# Patient Record
Sex: FEMALE | Race: OTHER | HISPANIC OR LATINO | URBAN - METROPOLITAN AREA
[De-identification: names, ages, dates, MRNs, and addresses within clinical notes are randomized per-mention and may not be internally consistent; named-entity substitution may affect disease eponyms.]

---

## 2018-03-24 ENCOUNTER — EMERGENCY (EMERGENCY)
Facility: HOSPITAL | Age: 83
LOS: 1 days | Discharge: HOME | End: 2018-03-24
Attending: EMERGENCY MEDICINE | Admitting: EMERGENCY MEDICINE
Payer: MEDICARE

## 2018-03-24 VITALS
SYSTOLIC BLOOD PRESSURE: 152 MMHG | HEART RATE: 82 BPM | TEMPERATURE: 98 F | RESPIRATION RATE: 17 BRPM | DIASTOLIC BLOOD PRESSURE: 78 MMHG | OXYGEN SATURATION: 99 %

## 2018-03-24 VITALS
OXYGEN SATURATION: 100 % | SYSTOLIC BLOOD PRESSURE: 138 MMHG | DIASTOLIC BLOOD PRESSURE: 94 MMHG | HEART RATE: 75 BPM | RESPIRATION RATE: 18 BRPM

## 2018-03-24 DIAGNOSIS — Z98.890 OTHER SPECIFIED POSTPROCEDURAL STATES: Chronic | ICD-10-CM

## 2018-03-24 LAB
ALBUMIN SERPL ELPH-MCNC: 3.9 G/DL — SIGNIFICANT CHANGE UP (ref 3.3–5)
ALP SERPL-CCNC: 80 U/L — SIGNIFICANT CHANGE UP (ref 40–120)
ALT FLD-CCNC: 15 U/L RC — SIGNIFICANT CHANGE UP (ref 10–45)
ANION GAP SERPL CALC-SCNC: 12 MMOL/L — SIGNIFICANT CHANGE UP (ref 5–17)
APPEARANCE UR: CLEAR — SIGNIFICANT CHANGE UP
AST SERPL-CCNC: 18 U/L — SIGNIFICANT CHANGE UP (ref 10–40)
BASOPHILS # BLD AUTO: 0 K/UL — SIGNIFICANT CHANGE UP (ref 0–0.2)
BASOPHILS NFR BLD AUTO: 0.4 % — SIGNIFICANT CHANGE UP (ref 0–2)
BILIRUB SERPL-MCNC: 0.3 MG/DL — SIGNIFICANT CHANGE UP (ref 0.2–1.2)
BILIRUB UR-MCNC: NEGATIVE — SIGNIFICANT CHANGE UP
BUN SERPL-MCNC: 33 MG/DL — HIGH (ref 7–23)
CALCIUM SERPL-MCNC: 10 MG/DL — SIGNIFICANT CHANGE UP (ref 8.4–10.5)
CHLORIDE SERPL-SCNC: 99 MMOL/L — SIGNIFICANT CHANGE UP (ref 96–108)
CO2 SERPL-SCNC: 27 MMOL/L — SIGNIFICANT CHANGE UP (ref 22–31)
COLOR SPEC: YELLOW — SIGNIFICANT CHANGE UP
COMMENT - URINE: SIGNIFICANT CHANGE UP
CREAT SERPL-MCNC: 0.99 MG/DL — SIGNIFICANT CHANGE UP (ref 0.5–1.3)
DIFF PNL FLD: NEGATIVE — SIGNIFICANT CHANGE UP
EOSINOPHIL # BLD AUTO: 0.1 K/UL — SIGNIFICANT CHANGE UP (ref 0–0.5)
EOSINOPHIL NFR BLD AUTO: 0.8 % — SIGNIFICANT CHANGE UP (ref 0–6)
EPI CELLS # UR: SIGNIFICANT CHANGE UP /HPF
GLUCOSE SERPL-MCNC: 104 MG/DL — HIGH (ref 70–99)
GLUCOSE UR QL: NEGATIVE — SIGNIFICANT CHANGE UP
HCT VFR BLD CALC: 35.3 % — SIGNIFICANT CHANGE UP (ref 34.5–45)
HGB BLD-MCNC: 11.5 G/DL — SIGNIFICANT CHANGE UP (ref 11.5–15.5)
KETONES UR-MCNC: NEGATIVE — SIGNIFICANT CHANGE UP
LEUKOCYTE ESTERASE UR-ACNC: ABNORMAL
LYMPHOCYTES # BLD AUTO: 1.6 K/UL — SIGNIFICANT CHANGE UP (ref 1–3.3)
LYMPHOCYTES # BLD AUTO: 25 % — SIGNIFICANT CHANGE UP (ref 13–44)
MCHC RBC-ENTMCNC: 29.3 PG — SIGNIFICANT CHANGE UP (ref 27–34)
MCHC RBC-ENTMCNC: 32.7 GM/DL — SIGNIFICANT CHANGE UP (ref 32–36)
MCV RBC AUTO: 89.8 FL — SIGNIFICANT CHANGE UP (ref 80–100)
MONOCYTES # BLD AUTO: 0.7 K/UL — SIGNIFICANT CHANGE UP (ref 0–0.9)
MONOCYTES NFR BLD AUTO: 10.2 % — SIGNIFICANT CHANGE UP (ref 2–14)
NEUTROPHILS # BLD AUTO: 4.2 K/UL — SIGNIFICANT CHANGE UP (ref 1.8–7.4)
NEUTROPHILS NFR BLD AUTO: 63.6 % — SIGNIFICANT CHANGE UP (ref 43–77)
NITRITE UR-MCNC: NEGATIVE — SIGNIFICANT CHANGE UP
PH UR: 5.5 — SIGNIFICANT CHANGE UP (ref 5–8)
PLATELET # BLD AUTO: 303 K/UL — SIGNIFICANT CHANGE UP (ref 150–400)
POTASSIUM SERPL-MCNC: 4.7 MMOL/L — SIGNIFICANT CHANGE UP (ref 3.5–5.3)
POTASSIUM SERPL-SCNC: 4.7 MMOL/L — SIGNIFICANT CHANGE UP (ref 3.5–5.3)
PROT SERPL-MCNC: 7.2 G/DL — SIGNIFICANT CHANGE UP (ref 6–8.3)
PROT UR-MCNC: SIGNIFICANT CHANGE UP
RBC # BLD: 3.93 M/UL — SIGNIFICANT CHANGE UP (ref 3.8–5.2)
RBC # FLD: 13.6 % — SIGNIFICANT CHANGE UP (ref 10.3–14.5)
RBC CASTS # UR COMP ASSIST: SIGNIFICANT CHANGE UP /HPF (ref 0–2)
SODIUM SERPL-SCNC: 138 MMOL/L — SIGNIFICANT CHANGE UP (ref 135–145)
SP GR SPEC: 1.02 — SIGNIFICANT CHANGE UP (ref 1.01–1.02)
UROBILINOGEN FLD QL: NEGATIVE — SIGNIFICANT CHANGE UP
WBC # BLD: 6.6 K/UL — SIGNIFICANT CHANGE UP (ref 3.8–10.5)
WBC # FLD AUTO: 6.6 K/UL — SIGNIFICANT CHANGE UP (ref 3.8–10.5)
WBC UR QL: SIGNIFICANT CHANGE UP /HPF (ref 0–5)

## 2018-03-24 PROCEDURE — 85027 COMPLETE CBC AUTOMATED: CPT

## 2018-03-24 PROCEDURE — 70450 CT HEAD/BRAIN W/O DYE: CPT | Mod: 26

## 2018-03-24 PROCEDURE — 99284 EMERGENCY DEPT VISIT MOD MDM: CPT | Mod: 25

## 2018-03-24 PROCEDURE — 80053 COMPREHEN METABOLIC PANEL: CPT

## 2018-03-24 PROCEDURE — 87086 URINE CULTURE/COLONY COUNT: CPT

## 2018-03-24 PROCEDURE — 70450 CT HEAD/BRAIN W/O DYE: CPT

## 2018-03-24 PROCEDURE — 81001 URINALYSIS AUTO W/SCOPE: CPT

## 2018-03-24 RX ORDER — NITROFURANTOIN MACROCRYSTAL 50 MG
1 CAPSULE ORAL
Qty: 6 | Refills: 0
Start: 2018-03-24 | End: 2018-03-26

## 2018-03-24 NOTE — ED PROVIDER NOTE - MEDICAL DECISION MAKING DETAILS
Paulie PGY3: history of recent poorly controlled htn and headaches with recent medication change. feels well on arrival, normal BP. Seems to be responding well to increased medication dose. Will ct head for subdural, not thunderclap, not worst headache ever, low suspicion for sah, basic labs to eval cr, ua and reeval. Paulie PGY3: history of recent poorly controlled htn and headaches with recent medication change. feels well on arrival, normal BP. Seems to be responding well to increased medication dose. Will ct head for subdural, not thunderclap, not worst headache ever, low suspicion for sah, basic labs to eval cr, ua and reeval.    Ulises COREA: 85 y/o female with hx of HTN s/p Colon resection and Mastectomy here with recurrent lightheaededness. Patient also reports her BP has been out of controlled for the past 2 weeks. Ana Maria usually takes Enalapril and was seen by her PMD and at OSH where her dose was increased from 5 mg QD to 20 mg QD. Also reports she was treated at OSH for high SBP. Patient denies CP, SOB, focal weakness, decreased UOP, rash, vision changes,, back pain, neck pain or fever. Exam shows a woman in NAD with abd soft and nontender and clear lungs and cardiac S1S2 w/o murmurs and no ataxia and moving all her extremities. COnsider poorly controlled BP and side effects to medications. Electroylte abnormality. Plan CMP, EKG, UA and reassess.

## 2018-03-24 NOTE — ED ADULT TRIAGE NOTE - CHIEF COMPLAINT QUOTE
pt c/o "htn and h/a, dizziness, nausea. Did not take HTN meds tonight" pt c/o "htn and h/a, dizziness, nausea. Did not take HTN meds tonight. No CP"

## 2018-03-24 NOTE — ED PROVIDER NOTE - ATTENDING CONTRIBUTION TO CARE
Attending MD Montgomery:  I personally have seen and examined this patient.  Resident note reviewed and agree on plan of care and except where noted.  See MDM for details.

## 2018-03-24 NOTE — ED PROVIDER NOTE - PHYSICAL EXAMINATION
Paulie: A & O x 3, NAD, HEENT WNL and no facial asymmetry; lungs CTAB, heart with reg rhythm; abdomen soft NTND; extremities with no edema; skin with no rashes, neuro exam non focal with no motor or sensory deficits including coordination, ambulation and rhomberg

## 2018-03-24 NOTE — ED ADULT NURSE NOTE - OBJECTIVE STATEMENT
83 yo female complaining of headache, dizziness and nausea. As per pt, she is on hypertension medication and was just increased, since that happened, pt has been feeling weak, sleepy, dizzy and feels nausea. Family at the bedside, pt alerted and oriented x3, no sings of acute distress noted at this moment. Vitals WNL, (BP slightly elevated), heart sounds normal, lungs clear, abdomen flat, non distended. Pt denies vomiting, pt denies urinary or BM problems. Will continue to monitor closely.

## 2018-03-24 NOTE — ED PROVIDER NOTE - OBJECTIVE STATEMENT
84 year old, history of breast cancer surgically treated in 2005. Also has history of bowel resection for colon cancer many years ago. presenting with difficult to control blood pressure, left sided headache, intermittent dizziness for 1 week. Patient takes enalapril and apap. headache is 8/10. Dizziness is described as imbalance and she sometimes closes her eyes to make symptoms feel better. Highest BP last week was in the 260s and she was seen in the ER at [facility]. Patient was given "injection" to bring her BP down. Her Enalapril has been increased to 20mg now. Patient lives alone.     PMD: Dionne Pritchard

## 2018-03-25 LAB
CULTURE RESULTS: SIGNIFICANT CHANGE UP
SPECIMEN SOURCE: SIGNIFICANT CHANGE UP

## 2020-01-01 NOTE — ED PROVIDER NOTE - NS ED ATTENDING STATEMENT MOD
3
I have personally seen and examined this patient.  I have fully participated in the care of this patient. I have reviewed all pertinent clinical information, including history, physical exam, plan and the Resident’s note and agree except as noted.

## 2020-08-23 ENCOUNTER — EMERGENCY (EMERGENCY)
Facility: HOSPITAL | Age: 85
LOS: 1 days | Discharge: ANOTHER HOSPITAL | End: 2020-08-23
Attending: EMERGENCY MEDICINE
Payer: MEDICARE

## 2020-08-23 ENCOUNTER — INPATIENT (INPATIENT)
Facility: HOSPITAL | Age: 85
LOS: 3 days | Discharge: NURSING, REHAB OR LONG-TERM CARE | DRG: 64 | End: 2020-08-27
Attending: SPECIALIST | Admitting: PSYCHIATRY & NEUROLOGY
Payer: MEDICARE

## 2020-08-23 VITALS
SYSTOLIC BLOOD PRESSURE: 208 MMHG | OXYGEN SATURATION: 97 % | DIASTOLIC BLOOD PRESSURE: 44 MMHG | HEART RATE: 63 BPM | TEMPERATURE: 98 F | RESPIRATION RATE: 19 BRPM

## 2020-08-23 VITALS
RESPIRATION RATE: 16 BRPM | SYSTOLIC BLOOD PRESSURE: 139 MMHG | DIASTOLIC BLOOD PRESSURE: 65 MMHG | HEIGHT: 66 IN | HEART RATE: 87 BPM | OXYGEN SATURATION: 96 % | WEIGHT: 130.07 LBS

## 2020-08-23 VITALS — HEART RATE: 80 BPM | SYSTOLIC BLOOD PRESSURE: 177 MMHG | DIASTOLIC BLOOD PRESSURE: 65 MMHG

## 2020-08-23 DIAGNOSIS — Z98.890 OTHER SPECIFIED POSTPROCEDURAL STATES: Chronic | ICD-10-CM

## 2020-08-23 DIAGNOSIS — I61.9 NONTRAUMATIC INTRACEREBRAL HEMORRHAGE, UNSPECIFIED: ICD-10-CM

## 2020-08-23 LAB
ALBUMIN SERPL ELPH-MCNC: 3.6 G/DL — SIGNIFICANT CHANGE UP (ref 3.5–5)
ALBUMIN SERPL ELPH-MCNC: 4 G/DL — SIGNIFICANT CHANGE UP (ref 3.3–5)
ALP SERPL-CCNC: 74 U/L — SIGNIFICANT CHANGE UP (ref 40–120)
ALP SERPL-CCNC: 89 U/L — SIGNIFICANT CHANGE UP (ref 40–120)
ALT FLD-CCNC: 18 U/L DA — SIGNIFICANT CHANGE UP (ref 10–60)
ALT FLD-CCNC: 9 U/L — LOW (ref 10–45)
ANION GAP SERPL CALC-SCNC: 14 MMOL/L — SIGNIFICANT CHANGE UP (ref 5–17)
ANION GAP SERPL CALC-SCNC: 6 MMOL/L — SIGNIFICANT CHANGE UP (ref 5–17)
APTT BLD: 33.9 SEC — SIGNIFICANT CHANGE UP (ref 27.5–35.5)
APTT BLD: 34.5 SEC — SIGNIFICANT CHANGE UP (ref 27.5–35.5)
AST SERPL-CCNC: 22 U/L — SIGNIFICANT CHANGE UP (ref 10–40)
AST SERPL-CCNC: 30 U/L — SIGNIFICANT CHANGE UP (ref 10–40)
BASOPHILS # BLD AUTO: 0.03 K/UL — SIGNIFICANT CHANGE UP (ref 0–0.2)
BASOPHILS # BLD AUTO: 0.03 K/UL — SIGNIFICANT CHANGE UP (ref 0–0.2)
BASOPHILS NFR BLD AUTO: 0.3 % — SIGNIFICANT CHANGE UP (ref 0–2)
BASOPHILS NFR BLD AUTO: 0.3 % — SIGNIFICANT CHANGE UP (ref 0–2)
BILIRUB SERPL-MCNC: 0.6 MG/DL — SIGNIFICANT CHANGE UP (ref 0.2–1.2)
BILIRUB SERPL-MCNC: 0.7 MG/DL — SIGNIFICANT CHANGE UP (ref 0.2–1.2)
BLD GP AB SCN SERPL QL: SIGNIFICANT CHANGE UP
BUN SERPL-MCNC: 20 MG/DL — SIGNIFICANT CHANGE UP (ref 7–23)
BUN SERPL-MCNC: 22 MG/DL — HIGH (ref 7–18)
CALCIUM SERPL-MCNC: 8.8 MG/DL — SIGNIFICANT CHANGE UP (ref 8.4–10.5)
CALCIUM SERPL-MCNC: 9 MG/DL — SIGNIFICANT CHANGE UP (ref 8.4–10.5)
CHLORIDE SERPL-SCNC: 104 MMOL/L — SIGNIFICANT CHANGE UP (ref 96–108)
CHLORIDE SERPL-SCNC: 99 MMOL/L — SIGNIFICANT CHANGE UP (ref 96–108)
CO2 SERPL-SCNC: 21 MMOL/L — LOW (ref 22–31)
CO2 SERPL-SCNC: 27 MMOL/L — SIGNIFICANT CHANGE UP (ref 22–31)
CREAT SERPL-MCNC: 0.62 MG/DL — SIGNIFICANT CHANGE UP (ref 0.5–1.3)
CREAT SERPL-MCNC: 0.85 MG/DL — SIGNIFICANT CHANGE UP (ref 0.5–1.3)
EOSINOPHIL # BLD AUTO: 0.02 K/UL — SIGNIFICANT CHANGE UP (ref 0–0.5)
EOSINOPHIL # BLD AUTO: 0.03 K/UL — SIGNIFICANT CHANGE UP (ref 0–0.5)
EOSINOPHIL NFR BLD AUTO: 0.2 % — SIGNIFICANT CHANGE UP (ref 0–6)
EOSINOPHIL NFR BLD AUTO: 0.3 % — SIGNIFICANT CHANGE UP (ref 0–6)
GLUCOSE SERPL-MCNC: 117 MG/DL — HIGH (ref 70–99)
GLUCOSE SERPL-MCNC: 118 MG/DL — HIGH (ref 70–99)
HCT VFR BLD CALC: 38.1 % — SIGNIFICANT CHANGE UP (ref 34.5–45)
HCT VFR BLD CALC: 39.4 % — SIGNIFICANT CHANGE UP (ref 34.5–45)
HEPARINASE TEG R TIME: 6.6 MIN — SIGNIFICANT CHANGE UP (ref 4.3–8.3)
HGB BLD-MCNC: 11.9 G/DL — SIGNIFICANT CHANGE UP (ref 11.5–15.5)
HGB BLD-MCNC: 12.3 G/DL — SIGNIFICANT CHANGE UP (ref 11.5–15.5)
IMM GRANULOCYTES NFR BLD AUTO: 0.4 % — SIGNIFICANT CHANGE UP (ref 0–1.5)
IMM GRANULOCYTES NFR BLD AUTO: 0.6 % — SIGNIFICANT CHANGE UP (ref 0–1.5)
INR BLD: 0.92 RATIO — SIGNIFICANT CHANGE UP (ref 0.88–1.16)
INR BLD: 0.96 RATIO — SIGNIFICANT CHANGE UP (ref 0.88–1.16)
LYMPHOCYTES # BLD AUTO: 0.73 K/UL — LOW (ref 1–3.3)
LYMPHOCYTES # BLD AUTO: 0.91 K/UL — LOW (ref 1–3.3)
LYMPHOCYTES # BLD AUTO: 6.5 % — LOW (ref 13–44)
LYMPHOCYTES # BLD AUTO: 9.1 % — LOW (ref 13–44)
MAGNESIUM SERPL-MCNC: 1.8 MG/DL — SIGNIFICANT CHANGE UP (ref 1.6–2.6)
MCHC RBC-ENTMCNC: 28.3 PG — SIGNIFICANT CHANGE UP (ref 27–34)
MCHC RBC-ENTMCNC: 28.4 PG — SIGNIFICANT CHANGE UP (ref 27–34)
MCHC RBC-ENTMCNC: 31.2 GM/DL — LOW (ref 32–36)
MCHC RBC-ENTMCNC: 31.2 GM/DL — LOW (ref 32–36)
MCV RBC AUTO: 90.7 FL — SIGNIFICANT CHANGE UP (ref 80–100)
MCV RBC AUTO: 91 FL — SIGNIFICANT CHANGE UP (ref 80–100)
MONOCYTES # BLD AUTO: 0.31 K/UL — SIGNIFICANT CHANGE UP (ref 0–0.9)
MONOCYTES # BLD AUTO: 0.38 K/UL — SIGNIFICANT CHANGE UP (ref 0–0.9)
MONOCYTES NFR BLD AUTO: 3.1 % — SIGNIFICANT CHANGE UP (ref 2–14)
MONOCYTES NFR BLD AUTO: 3.4 % — SIGNIFICANT CHANGE UP (ref 2–14)
NEUTROPHILS # BLD AUTO: 10 K/UL — HIGH (ref 1.8–7.4)
NEUTROPHILS # BLD AUTO: 8.67 K/UL — HIGH (ref 1.8–7.4)
NEUTROPHILS NFR BLD AUTO: 86.9 % — HIGH (ref 43–77)
NEUTROPHILS NFR BLD AUTO: 88.9 % — HIGH (ref 43–77)
NRBC # BLD: 0 /100 WBCS — SIGNIFICANT CHANGE UP (ref 0–0)
NRBC # BLD: 0 /100 WBCS — SIGNIFICANT CHANGE UP (ref 0–0)
PA ADP PRP-ACNC: 262 PRU — SIGNIFICANT CHANGE UP (ref 194–417)
PHOSPHATE SERPL-MCNC: 2.6 MG/DL — SIGNIFICANT CHANGE UP (ref 2.5–4.5)
PLATELET # BLD AUTO: 289 K/UL — SIGNIFICANT CHANGE UP (ref 150–400)
PLATELET # BLD AUTO: 290 K/UL — SIGNIFICANT CHANGE UP (ref 150–400)
PLATELET MAPPING ACTF MAX AMPLITUDE: 19.1 MM (ref 2–19)
PLATELET MAPPING ADP MAXIMUM AMPLITUDE: 62.6 MM — SIGNIFICANT CHANGE UP (ref 45–69)
PLATELET MAPPING ADP PERCENT INHIBITION: 9.2 % — SIGNIFICANT CHANGE UP (ref 0–17)
PLATELET MAPPING HKH MAXIMUM AMPLITUDE: 67 MM — SIGNIFICANT CHANGE UP (ref 53–68)
PLATELET RESPONSE ASPIRIN RESULT: 525 ARU — SIGNIFICANT CHANGE UP (ref 350–700)
POTASSIUM SERPL-MCNC: 3.4 MMOL/L — LOW (ref 3.5–5.3)
POTASSIUM SERPL-MCNC: 3.6 MMOL/L — SIGNIFICANT CHANGE UP (ref 3.5–5.3)
POTASSIUM SERPL-SCNC: 3.4 MMOL/L — LOW (ref 3.5–5.3)
POTASSIUM SERPL-SCNC: 3.6 MMOL/L — SIGNIFICANT CHANGE UP (ref 3.5–5.3)
PROT SERPL-MCNC: 6.9 G/DL — SIGNIFICANT CHANGE UP (ref 6–8.3)
PROT SERPL-MCNC: 7.9 G/DL — SIGNIFICANT CHANGE UP (ref 6–8.3)
PROTHROM AB SERPL-ACNC: 11 SEC — SIGNIFICANT CHANGE UP (ref 10.6–13.6)
PROTHROM AB SERPL-ACNC: 11.3 SEC — SIGNIFICANT CHANGE UP (ref 10.6–13.6)
RAPIDTEG MAXIMUM AMPLITUDE: 68.2 MM — SIGNIFICANT CHANGE UP (ref 52–70)
RBC # BLD: 4.2 M/UL — SIGNIFICANT CHANGE UP (ref 3.8–5.2)
RBC # BLD: 4.33 M/UL — SIGNIFICANT CHANGE UP (ref 3.8–5.2)
RBC # FLD: 14.6 % — HIGH (ref 10.3–14.5)
RBC # FLD: 14.7 % — HIGH (ref 10.3–14.5)
SARS-COV-2 RNA SPEC QL NAA+PROBE: SIGNIFICANT CHANGE UP
SODIUM SERPL-SCNC: 134 MMOL/L — LOW (ref 135–145)
SODIUM SERPL-SCNC: 137 MMOL/L — SIGNIFICANT CHANGE UP (ref 135–145)
TEG FUNCTIONAL FIBRINOGEN: 28.4 MM — SIGNIFICANT CHANGE UP (ref 15–32)
TEG MAXIMUM AMPLITUDE: 67.9 MM — SIGNIFICANT CHANGE UP (ref 52–69)
TEG REACTION TIME: 6.8 MIN — SIGNIFICANT CHANGE UP (ref 4.6–9.1)
WBC # BLD: 11.24 K/UL — HIGH (ref 3.8–10.5)
WBC # BLD: 9.98 K/UL — SIGNIFICANT CHANGE UP (ref 3.8–10.5)
WBC # FLD AUTO: 11.24 K/UL — HIGH (ref 3.8–10.5)
WBC # FLD AUTO: 9.98 K/UL — SIGNIFICANT CHANGE UP (ref 3.8–10.5)

## 2020-08-23 PROCEDURE — 36415 COLL VENOUS BLD VENIPUNCTURE: CPT

## 2020-08-23 PROCEDURE — 70450 CT HEAD/BRAIN W/O DYE: CPT

## 2020-08-23 PROCEDURE — 85730 THROMBOPLASTIN TIME PARTIAL: CPT

## 2020-08-23 PROCEDURE — 70450 CT HEAD/BRAIN W/O DYE: CPT | Mod: 26,59,77

## 2020-08-23 PROCEDURE — 70498 CT ANGIOGRAPHY NECK: CPT

## 2020-08-23 PROCEDURE — 99291 CRITICAL CARE FIRST HOUR: CPT

## 2020-08-23 PROCEDURE — 93010 ELECTROCARDIOGRAM REPORT: CPT

## 2020-08-23 PROCEDURE — 86850 RBC ANTIBODY SCREEN: CPT

## 2020-08-23 PROCEDURE — 96375 TX/PRO/DX INJ NEW DRUG ADDON: CPT | Mod: XU

## 2020-08-23 PROCEDURE — 70496 CT ANGIOGRAPHY HEAD: CPT | Mod: 26

## 2020-08-23 PROCEDURE — 86901 BLOOD TYPING SEROLOGIC RH(D): CPT

## 2020-08-23 PROCEDURE — 70496 CT ANGIOGRAPHY HEAD: CPT

## 2020-08-23 PROCEDURE — 85610 PROTHROMBIN TIME: CPT

## 2020-08-23 PROCEDURE — 96374 THER/PROPH/DIAG INJ IV PUSH: CPT | Mod: XU

## 2020-08-23 PROCEDURE — 70450 CT HEAD/BRAIN W/O DYE: CPT | Mod: 26,59

## 2020-08-23 PROCEDURE — 85027 COMPLETE CBC AUTOMATED: CPT

## 2020-08-23 PROCEDURE — 70498 CT ANGIOGRAPHY NECK: CPT | Mod: 26

## 2020-08-23 PROCEDURE — 87635 SARS-COV-2 COVID-19 AMP PRB: CPT

## 2020-08-23 PROCEDURE — 99291 CRITICAL CARE FIRST HOUR: CPT | Mod: 25

## 2020-08-23 PROCEDURE — 86900 BLOOD TYPING SEROLOGIC ABO: CPT

## 2020-08-23 PROCEDURE — 80053 COMPREHEN METABOLIC PANEL: CPT

## 2020-08-23 RX ORDER — NICARDIPINE HYDROCHLORIDE 30 MG/1
5 CAPSULE, EXTENDED RELEASE ORAL
Qty: 40 | Refills: 0 | Status: DISCONTINUED | OUTPATIENT
Start: 2020-08-23 | End: 2020-08-24

## 2020-08-23 RX ORDER — NICARDIPINE HYDROCHLORIDE 30 MG/1
10 CAPSULE, EXTENDED RELEASE ORAL
Qty: 40 | Refills: 0 | Status: DISCONTINUED | OUTPATIENT
Start: 2020-08-23 | End: 2020-08-23

## 2020-08-23 RX ORDER — NICARDIPINE HYDROCHLORIDE 30 MG/1
5 CAPSULE, EXTENDED RELEASE ORAL
Qty: 40 | Refills: 0 | Status: DISCONTINUED | OUTPATIENT
Start: 2020-08-23 | End: 2020-08-27

## 2020-08-23 RX ORDER — ACETAMINOPHEN 500 MG
1000 TABLET ORAL ONCE
Refills: 0 | Status: COMPLETED | OUTPATIENT
Start: 2020-08-23 | End: 2020-08-23

## 2020-08-23 RX ORDER — ACETAMINOPHEN 500 MG
650 TABLET ORAL EVERY 6 HOURS
Refills: 0 | Status: DISCONTINUED | OUTPATIENT
Start: 2020-08-23 | End: 2020-08-23

## 2020-08-23 RX ORDER — ACETAMINOPHEN 500 MG
650 TABLET ORAL ONCE
Refills: 0 | Status: COMPLETED | OUTPATIENT
Start: 2020-08-23 | End: 2020-08-23

## 2020-08-23 RX ORDER — NICARDIPINE HYDROCHLORIDE 30 MG/1
5 CAPSULE, EXTENDED RELEASE ORAL
Qty: 40 | Refills: 0 | Status: DISCONTINUED | OUTPATIENT
Start: 2020-08-23 | End: 2020-08-23

## 2020-08-23 RX ORDER — MANNITOL
30 POWDER (GRAM) MISCELLANEOUS ONCE
Refills: 0 | Status: DISCONTINUED | OUTPATIENT
Start: 2020-08-23 | End: 2020-08-23

## 2020-08-23 RX ORDER — CHLORHEXIDINE GLUCONATE 213 G/1000ML
1 SOLUTION TOPICAL
Refills: 0 | Status: DISCONTINUED | OUTPATIENT
Start: 2020-08-23 | End: 2020-08-26

## 2020-08-23 RX ORDER — DEXMEDETOMIDINE HYDROCHLORIDE IN 0.9% SODIUM CHLORIDE 4 UG/ML
0.2 INJECTION INTRAVENOUS
Qty: 200 | Refills: 0 | Status: DISCONTINUED | OUTPATIENT
Start: 2020-08-23 | End: 2020-08-24

## 2020-08-23 RX ORDER — MORPHINE SULFATE 50 MG/1
2 CAPSULE, EXTENDED RELEASE ORAL ONCE
Refills: 0 | Status: DISCONTINUED | OUTPATIENT
Start: 2020-08-23 | End: 2020-08-23

## 2020-08-23 RX ORDER — TRAMADOL HYDROCHLORIDE 50 MG/1
25 TABLET ORAL EVERY 4 HOURS
Refills: 0 | Status: DISCONTINUED | OUTPATIENT
Start: 2020-08-23 | End: 2020-08-27

## 2020-08-23 RX ORDER — DESMOPRESSIN ACETATE 0.1 MG/1
18 TABLET ORAL ONCE
Refills: 0 | Status: COMPLETED | OUTPATIENT
Start: 2020-08-23 | End: 2020-08-23

## 2020-08-23 RX ORDER — TRAMADOL HYDROCHLORIDE 50 MG/1
50 TABLET ORAL EVERY 4 HOURS
Refills: 0 | Status: DISCONTINUED | OUTPATIENT
Start: 2020-08-23 | End: 2020-08-27

## 2020-08-23 RX ORDER — POLYETHYLENE GLYCOL 3350 17 G/17G
17 POWDER, FOR SOLUTION ORAL
Refills: 0 | Status: DISCONTINUED | OUTPATIENT
Start: 2020-08-23 | End: 2020-08-27

## 2020-08-23 RX ORDER — SODIUM CHLORIDE 5 G/100ML
1000 INJECTION, SOLUTION INTRAVENOUS
Refills: 0 | Status: DISCONTINUED | OUTPATIENT
Start: 2020-08-23 | End: 2020-08-24

## 2020-08-23 RX ORDER — SENNA PLUS 8.6 MG/1
2 TABLET ORAL AT BEDTIME
Refills: 0 | Status: DISCONTINUED | OUTPATIENT
Start: 2020-08-23 | End: 2020-08-27

## 2020-08-23 RX ORDER — ACETAMINOPHEN 500 MG
650 TABLET ORAL EVERY 6 HOURS
Refills: 0 | Status: DISCONTINUED | OUTPATIENT
Start: 2020-08-23 | End: 2020-08-27

## 2020-08-23 RX ADMIN — NICARDIPINE HYDROCHLORIDE 25 MG/HR: 30 CAPSULE, EXTENDED RELEASE ORAL at 21:00

## 2020-08-23 RX ADMIN — NICARDIPINE HYDROCHLORIDE 25 MG/HR: 30 CAPSULE, EXTENDED RELEASE ORAL at 19:28

## 2020-08-23 RX ADMIN — Medication 650 MILLIGRAM(S): at 14:38

## 2020-08-23 RX ADMIN — Medication 400 MILLIGRAM(S): at 21:00

## 2020-08-23 RX ADMIN — CHLORHEXIDINE GLUCONATE 1 APPLICATION(S): 213 SOLUTION TOPICAL at 21:41

## 2020-08-23 RX ADMIN — Medication 650 MILLIGRAM(S): at 15:10

## 2020-08-23 RX ADMIN — DEXMEDETOMIDINE HYDROCHLORIDE IN 0.9% SODIUM CHLORIDE 2.95 MICROGRAM(S)/KG/HR: 4 INJECTION INTRAVENOUS at 21:00

## 2020-08-23 RX ADMIN — MORPHINE SULFATE 2 MILLIGRAM(S): 50 CAPSULE, EXTENDED RELEASE ORAL at 16:28

## 2020-08-23 RX ADMIN — DESMOPRESSIN ACETATE 218 MICROGRAM(S): 0.1 TABLET ORAL at 19:43

## 2020-08-23 RX ADMIN — Medication 1000 MILLIGRAM(S): at 21:30

## 2020-08-23 RX ADMIN — SENNA PLUS 2 TABLET(S): 8.6 TABLET ORAL at 21:41

## 2020-08-23 RX ADMIN — NICARDIPINE HYDROCHLORIDE 25 MG/HR: 30 CAPSULE, EXTENDED RELEASE ORAL at 17:08

## 2020-08-23 RX ADMIN — NICARDIPINE HYDROCHLORIDE 50 MG/HR: 30 CAPSULE, EXTENDED RELEASE ORAL at 18:09

## 2020-08-23 NOTE — ED ADULT NURSE NOTE - OBJECTIVE STATEMENT
87y female being brought in by EMS for a head injury. Pt is Polish speaking MD carrillo at bedside able to communicate with patient. Pt is Aox2 pt confused to location. Pt presents as a transfer from [facility], pt arrives with a nicardipine gtts at 10mg/hr. As per MD Carrillo from Neuro surgery maintain pt systolic BP from 120 to 160 while on Nicardipine gtts. Pt initial Bp is 113/77 nicardipine gtts lowered to 8mg/hr MD carrillo at the bedside. 87y female being brought in by EMS for a head injury. Pt arrived to [place] with complaints of headache, CT showed a ICH. Pt is Turkmen speaking MD carrillo at bedside able to communicate with patient. Pt is Aox2 pt confused to location. Pt presents as a transfer from [facility], pt arrives with a nicardipine gtts at 10mg/hr. As per MD Carrillo from Neuro surgery maintain pt systolic BP from 120 to 160 while on Nicardipine gtts. Pt initial Bp is 113/77 nicardipine gtts lowered to 8mg/hr MD carrillo at the bedside. Pt breathing is clear and unlabored. Pt is non diaphoretic, not rigors noted. Pt able to move all extremities. 87y female being brought in by EMS for a head injury. Pt arrived to [place] with complaints of headache, CT showed a large ICH. Pt is Wolof speaking MD carrillo at bedside able to communicate with patient. Pt is Aox2 pt confused to location. Pt presents as a transfer from [facility], pt arrives with a nicardipine gtts at 10mg/hr. As per MD Carrillo from Neuro surgery maintain pt systolic BP from 120 to 160 while on Nicardipine gtts. Pt initial Bp is 113/77 nicardipine gtts lowered to 8mg/hr MD carrillo at the bedside. Pt breathing is clear and unlabored. Pt is non diaphoretic, not rigors noted. Pt able to move all extremities. Skin is intact. Pt denies any nausea or vomiting. 87y female being brought in by EMS for a head injury. Pt arrived to [place] with complaints of headache, CT showed a large ICH. Pt is Khmer speaking MD carrillo at bedside able to communicate with patient. Pt is Aox2 pt confused to location, pt is repetitive, unable to give a hisotry or answer assessment questions. Pt arrives with a nicardipine gtts at 10mg/hr. As per MD Carrillo from Neuro surgery maintain pt systolic BP from 120 to 160 while on Nicardipine gtts. Pt initial Bp is 113/77 nicardipine gtts lowered to 8mg/hr MD carrillo at the bedside. Pt has a PMH of Breast CA, and HTN. Pt breathing is clear and unlabored. Pt is non diaphoretic, no rigors noted. Pt able to move all extremities. Pulses palpable in all extremities +2. Skin is intact. Pt denies any nausea or vomiting.

## 2020-08-23 NOTE — PROGRESS NOTE ADULT - SUBJECTIVE AND OBJECTIVE BOX
SUMMARY:  87 year-old woman with HTN presenting as transfer from [place] for  ICH on cardene w/possible early uncal herniation on CT. Patient AxO x 2 in the ED, denies all symptoms. No headache, changes in vision, nausea, vomiting. SUMMARY:  87 year-old woman with hypertension presented as transfer from [place] on 8/23/20 for large right occipital intraparenchymal hemorrhage and falcine subdural hematoma with cerebral edema and brain compression in setting of hypertension requiring nicardipine drip.     HOSPITAL COURSE:  8/23: Admitted to [facility]. Made DNR/DNI.    24 HOUR EVENTS:   Dr. Head of Neurosurgery discussed patient's condition and various treatment options with patient's surrogate, sister Magui ([phone]). I witnessed the conversation which resulted in patient code status updated to DNR/DNI. Family also do not want to undergo any surgical procedures. They are agreeable with medical management short of intubation and chest compressions.     VITALS/DATA/ORDERS: [x] Reviewed    EXAMINATION: SUMMARY:  87 year-old woman with hypertension presented as transfer from [place] on 8/23/20 for large right occipital intraparenchymal hemorrhage and falcine subdural hematoma with cerebral edema and brain compression in setting of hypertension requiring nicardipine drip.     HOSPITAL COURSE:  8/23: Admitted to [facility]. Made DNR/DNI. Received ddAVP for ASA use.     24 HOUR EVENTS:   Dr. Head of Neurosurgery discussed patient's condition and various treatment options with patient's surrogate, sister Magui ([phone]). I witnessed the conversation which resulted in patient code status updated to DNR/DNI. Family also do not want to undergo any surgical procedures. They are agreeable with medical management short of intubation and chest compressions.     VITALS/DATA/ORDERS: [x] Reviewed    EXAMINATION: SUMMARY:  87 year-old woman with hypertension presented as transfer from [place] on 8/23/20 for large right occipital intraparenchymal hemorrhage and falcine subdural hematoma with cerebral edema and brain compression in setting of hypertension requiring nicardipine drip.     ADMISSION SCORE:  ICH Score:    HOSPITAL COURSE:  8/23: Admitted to [facility]SCU. Made DNR/DNI. Received ddAVP for ASA use.     24 HOUR EVENTS:   Dr. Head of Neurosurgery discussed patient's condition and various treatment options with patient's surrogate, sister Magui ([phone]). I witnessed the conversation which resulted in patient code status updated to DNR/DNI. Family also do not want to undergo any surgical procedures. They are agreeable with medical management short of intubation and chest compressions.     VITALS/DATA/ORDERS: [x] Reviewed    REVIEW OF SYSTEMS: [] Unable to Assess due to neurologic exam   [x] All ROS addressed below are non-contributory, except:  Neuro: [x] Headache, but improved [ ] Back pain [ ] Numbness [ ] Weakness [ ] Ataxia [ ] Dizziness [ ] Aphasia [ ] Dysarthria [ ] Visual disturbance  Resp: [ ] Shortness of breath/dyspnea [ ] Orthopnea [ ] Cough  CV: [ ] Chest pain [ ] Palpitation [ ] Lightheadedness [ ] Syncope  Renal: [ ] Thirst [ ] Edema  GI: [ ] Nausea [ ] Emesis [ ] Abdominal pain [ ] Constipation [ ] Diarrhea  Hem: [ ] Hematemesis [ ] Bright red blood per rectum  ID: [ ] Fever [ ] Chills [ ] Dysuria  ENT: [ ] Rhinorrhea    EXAMINATION: (Examined in patient's preferred language Urdu)  General: No acute distress, sleepy but easily arouses to voice  HEENT: Anicteric sclerae  Cardiac: [id number]  Lungs: Clear  Abdomen: Soft, +BS  Extremities: No c/c/e  Skin/Incision Site: No overt rash  Neurologic: Awakens to voice, oriented to self, hospital (but thinks we are in [place]), month/year (but not exact date), poor attention requiring redirection, follows commands with delay and requires reminders to follow through with commands, possible decreased blink to threat on the left but patient unable to cooperate with visual field testing, face symmetric, all limbs symmetric and 4+/5 without drift (effort related motor exam) SUMMARY:  87 year-old woman with hypertension presented as transfer from [place] on 8/23/20 for large right occipital intraparenchymal hemorrhage and falcine subdural hematoma with cerebral edema and brain compression in setting of hypertension requiring nicardipine drip.     ADMISSION SCORE:  ICH Score: 1 (ICH ~20cc, GCS on presentation 14 [reported], Age >80, no IVH, cortical hemorrhage)    HOSPITAL COURSE:  8/23: Admitted to NSCU. Made DNR/DNI. Received ddAVP for ASA use.     24 HOUR EVENTS:   Dr. Head of Neurosurgery discussed patient's condition and various treatment options with patient's surrogate, sister Magui ([phone]). I witnessed the conversation which resulted in patient code status updated to DNR/DNI. Family also do not want to undergo any surgical procedures. They are agreeable with medical management short of intubation and chest compressions.     VITALS/DATA/ORDERS: [x] Reviewed    REVIEW OF SYSTEMS: [] Unable to Assess due to neurologic exam   [x] All ROS addressed below are non-contributory, except:  Neuro: [x] Headache, but improved [ ] Back pain [ ] Numbness [ ] Weakness [ ] Ataxia [ ] Dizziness [ ] Aphasia [ ] Dysarthria [ ] Visual disturbance  Resp: [ ] Shortness of breath/dyspnea [ ] Orthopnea [ ] Cough  CV: [ ] Chest pain [ ] Palpitation [ ] Lightheadedness [ ] Syncope  Renal: [ ] Thirst [ ] Edema  GI: [ ] Nausea [ ] Emesis [ ] Abdominal pain [ ] Constipation [ ] Diarrhea  Hem: [ ] Hematemesis [ ] Bright red blood per rectum  ID: [ ] Fever [ ] Chills [ ] Dysuria  ENT: [ ] Rhinorrhea    EXAMINATION: (Examined in patient's preferred language Mexican)  General: No acute distress, sleepy but easily arouses to voice  HEENT: Anicteric sclerae  Cardiac: [id number]  Lungs: Clear  Abdomen: Soft, +BS  Extremities: No c/c/e  Skin/Incision Site: No overt rash  Neurologic: Awakens to voice, oriented to self, hospital (but thinks we are in [place]), month/year (but not exact date), poor attention requiring redirection, follows commands with delay and requires reminders to follow through with commands, possible decreased blink to threat on the left but patient unable to cooperate with visual field testing, face symmetric, all limbs symmetric and 4+/5 without drift (effort related motor exam) SUMMARY:  87 year-old woman with hypertension presented as transfer from [place] on 8/23/20 for large right temporo-occipital intraparenchymal hemorrhage with falcine and interhemispheric subdural hematoma with cerebral edema and brain compression in setting of hypertension requiring nicardipine drip.     ADMISSION SCORE:  ICH Score: 1 (ICH ~20cc, GCS on presentation 14 [reported], Age >80, no IVH, cortical hemorrhage)     HOSPITAL COURSE:  8/23: Admitted to NSCU. Made DNR/DNI. Received ddAVP for ASA use.     24 HOUR EVENTS:   Dr. Head of Neurosurgery discussed patient's condition and various treatment options with patient's surrogate, sister Magui ([phone]). I witnessed the conversation which resulted in patient code status updated to DNR/DNI. Family also do not want to undergo any surgical procedures. They are agreeable with medical management short of intubation and chest compressions.     VITALS/DATA/ORDERS: [x] Reviewed    REVIEW OF SYSTEMS: [] Unable to Assess due to neurologic exam   [x] All ROS addressed below are non-contributory, except:  Neuro: [x] Headache, but improved [ ] Back pain [ ] Numbness [ ] Weakness [ ] Ataxia [ ] Dizziness [ ] Aphasia [ ] Dysarthria [ ] Visual disturbance  Resp: [ ] Shortness of breath/dyspnea [ ] Orthopnea [ ] Cough  CV: [ ] Chest pain [ ] Palpitation [ ] Lightheadedness [ ] Syncope  Renal: [ ] Thirst [ ] Edema  GI: [ ] Nausea [ ] Emesis [ ] Abdominal pain [ ] Constipation [ ] Diarrhea  Hem: [ ] Hematemesis [ ] Bright red blood per rectum  ID: [ ] Fever [ ] Chills [ ] Dysuria  ENT: [ ] Rhinorrhea    EXAMINATION: (Examined in patient's preferred language Tuvaluan)  General: No acute distress, sleepy but easily arouses to voice  HEENT: Anicteric sclerae  Cardiac: [id number]  Lungs: Clear  Abdomen: Soft, +BS  Extremities: No c/c/e  Skin/Incision Site: No overt rash  Neurologic: Awakens to voice, oriented to self, hospital (but thinks we are in [place]), month/year (but not exact date), poor attention requiring redirection, follows commands with delay and requires reminders to follow through with commands, possible decreased blink to threat on the left but patient unable to cooperate with visual field testing, face symmetric, all limbs symmetric and 4+/5 without drift (effort related motor exam)

## 2020-08-23 NOTE — ED ADULT NURSE NOTE - NSIMPLEMENTINTERV_GEN_ALL_ED
Implemented All Fall with Harm Risk Interventions:  Alice to call system. Call bell, personal items and telephone within reach. Instruct patient to call for assistance. Room bathroom lighting operational. Non-slip footwear when patient is off stretcher. Physically safe environment: no spills, clutter or unnecessary equipment. Stretcher in lowest position, wheels locked, appropriate side rails in place. Provide visual cue, wrist band, yellow gown, etc. Monitor gait and stability. Monitor for mental status changes and reorient to person, place, and time. Review medications for side effects contributing to fall risk. Reinforce activity limits and safety measures with patient and family. Provide visual clues: red socks.

## 2020-08-23 NOTE — ED PROVIDER NOTE - CRTICAL CARE TIME SPENT (MIN)
Returned pts call to make her aware we have not yet received the referral.  To make it easier for the pt writer will call her PMD directly - Dr Tameka Gilmore [phone]   [phone]

## 2020-08-23 NOTE — ED PROVIDER NOTE - OBJECTIVE STATEMENT
86 y/o female h/o HTN, right breast cancer s/p resection, presents with acute onset of headache 30 minutes prior to arrival. Patient has been complaining of pain since onset of symptom. States headache was maximal upon onset. Denies fhx of aneurysms. Not on A/C. Not . Has one sister who is with her bedside. Denies dysarthria, diplopia, change in speech or understanding, numbness, weakness, incontinence, trauma, or any other acute complaint.

## 2020-08-23 NOTE — ED PROVIDER NOTE - CLINICAL SUMMARY MEDICAL DECISION MAKING FREE TEXT BOX
87F with hx of HTN presenting as transfer from [place] with ICH on cardene w/possible early uncal herniation on CT. On exam, AxOx2, otherwise non-focal neuro exam. Will check labs, NSGY consult, TBA. Will keep SBP between 110 and 160.

## 2020-08-23 NOTE — H&P ADULT - HISTORY OF PRESENT ILLNESS
· HPI Objective Statement: 87F with hx of HTN presenting as transfer from [place] with ICH on cardene w/possible early uncal herniation on CT. Patient AxO x 2 in the ED, denies all symptoms. No headache, changes in vision, nausea, vomiting.

## 2020-08-23 NOTE — ED PROVIDER NOTE - PROGRESS NOTE DETAILS
pt a0 x3, gcs 15, no neuro deficit, protecting airway, d/w Ori JAMIL neurosurgery at [facility], agree with nicardipine

## 2020-08-23 NOTE — CHART NOTE - NSCHARTNOTEFT_GEN_A_CORE
CAPRINI SCORE [CLOT] Score on Admission for     AGE RELATED RISK FACTORS                                                       MOBILITY RELATED FACTORS  [ ] Age 41-60 years                                            (1 Point)                  [ ] Bed rest                                                        (1 Point)  [ ] Age: 61-74 years                                           (2 Points)                [ ] Plaster cast                                                   (2 Points)  [X] Age= 75 years                                              (3 Points)                  [ ] Bed bound for more than 72 hours         (2 Points)    DISEASE RELATED RISK FACTORS                                               GENDER SPECIFIC FACTORS  [ ] Edema in the lower extremities                       (1 Point)           [ ] Pregnancy                                                          (1 Point)  [ ] Varicose veins                                               (1 Point)                  [ ] Post-partum < 6 weeks                                   (1 Point)             [ ] BMI > 25 Kg/m2                                            (1 Point)                  [ ] Hormonal therapy  or oral contraception   (1 Point)                 [ ] Sepsis (in the previous month)                        (1 Point)            [ ] History of pregnancy complications             (1 point)  [ ] Pneumonia or serious lung disease                                            [ ] Unexplained or recurrent               (1 Point)           (in the previous month)                               (1 Point)  [ ] Abnormal pulmonary function test                     (1 Point)                 SURGERY RELATED RISK FACTORS (include planned surgeries)  [ ] Acute myocardial infarction                              (1 Point)                 [ ]  Section                                             (1 Point)  [ ] Congestive heart failure (in the previous month)  (1 Point)        [ ] Minor surgery                                                  (1 Point)   [ ] Inflammatory bowel disease                             (1 Point)                 [ ] Arthroscopic surgery                                        (2 Points)  [ ] Central venous access                                      (2 Points)  [X] History / presence of malignancy                   (2 points)   [ ] General surgery lasting more than 45 minutes   (2 Points)       [ ] Stroke (in the previous month)                          (5 Points)               [ ] Elective arthroplasty                                         (5 Points)                                                                                                                                               HEMATOLOGY RELATED FACTORS                                                 TRAUMA RELATED RISK FACTORS  [ ] Prior episodes of VTE                                     (3 Points)                [ ] Fracture of the hip, pelvis, or leg                       (5 Points)  [ ] Positive family history for VTE                         (3 Points)             [ ] Acute spinal cord injury (in the previous month)  (5 Points)  [ ] Prothrombin 10606 A                                     (3 Points)                [ ] Paralysis  (less than 1 month)                             (5 Points)  [ ] Factor V Leiden                                             (3 Points)                   [ ] Multiple Trauma within 1 month                        (5 Points)  [ ] Lupus anticoagulants                                     (3 Points)                                                           [ ] Anticardiolipin antibodies                               (3 Points)                                                       [ ] High homocysteine in the blood                      (3 Points)                                             [ ] Other congenital or acquired thrombophilia      (3 Points)                                                [ ] Heparin induced thrombocytopenia                  (3 Points)                                          Total Score [      5    ]    Risk:  Very low 0   Low 1 to 2   Moderate 3 to 4   High =5       VTE Prophylaxis Recommendations:  [X] mechanical pneumatic compression devices                                      [ ] contraindicated: _____________________  [ ] chemoprophylaxis                                                                                    [X] contraindicated ______intracranial hemorrhage_______________    **** HIGH LIKELIHOOD DVT PRESENT ON ADMISSION  [X] (please order LE dopplers within 24 hours of admission)

## 2020-08-23 NOTE — ED PROVIDER NOTE - PHYSICAL EXAMINATION
GENERAL: non-toxic appearing, in NAD  HEAD: atraumatic, normocephalic  EYES: vision grossly intact, no conjunctivitis or discharge  EARS: hearing grossly intact  NOSE: no nasal discharge, epistaxis   CARDIAC: RRR, normal S1S2,  no appreciable murmurs, no cyanosis, cap refill < 2 seconds  PULM: no respiratory distress, oxygen saturation on RA wnl, CTAB, no crackles, rales, rhonchi, or wheezing  GI: abdomen nondistended, soft, nontender, no guarding or rebound tenderness, no palpable masses  NEURO: awake and alert x 2, follow commands, normal speech, PERRLA, EOMI, no focal motor or sensory deficits  MSK: spine appears normal, no joint swelling or erythema, no gross deformities of extremities  EXT: no peripheral edema, calf tenderness, redness or swelling  SKIN: warm, dry, and intact, no rashes  PSYCH: appropriate mood and affect

## 2020-08-23 NOTE — PROGRESS NOTE ADULT - ASSESSMENT
ASSESSMENT/PLAN: cortical intraparenchymal hemorrhage and subdural hematoma with cerebral edema and brain compression    NEURO:  No neurosurgical intervention per family request  Cerebral edema: hypertonic saline to optimize Na+ (goal 140-150)  CT head in AM to assess for stability  Continued goals of care discussion with family  Delirium precautions  Activity: [] mobilize as tolerated [x] Bedrest [] PT [] OT [] PMNR    PULM:  Aspiration precautions  DNI    CV:  SBP goal 100-160mmHg  Nicardipine gtt as needed    RENAL:  Fluids: Hypertonic saline as above    GI:  Diet: NPO for now  Bowel regimen     ENDO:   Goal euglycemia (-180)    HEME/ONC:  VTE prophylaxis: [x] SCDs [] chemoprophylaxis [x] hold chemoprophylaxis due to: intracerebral hemorrhage [] high risk of DVT/PE on admission due to:    ID:  Monitor for fever    SOCIAL/FAMILY:  [] awaiting [x] updated via telephone [] family meeting  Sister Magui: [phone]    CODE STATUS:  [] Full Code [x] DNR [x] DNI [] Palliative/Comfort Care    DISPOSITION:  [x] ICU [] Stroke Unit [] Floor [] EMU [] RCU [] PCU    [x] Patient is at high risk of neurologic deterioration/death due to: cerebral edema with brain compression    Contact: [phone]

## 2020-08-23 NOTE — H&P ADULT - NSHPLABSRESULTS_GEN_ALL_CORE
Right temporo-occipital intraparenchymal hematoma with mass effect as described and possibly early uncal herniation. There is extension of the hematoma into the posterior horn of the right lateral ventricle.

## 2020-08-23 NOTE — ED ADULT NURSE NOTE - CAS EDN DISCHARGE ASSESSMENT
Awake/No adverse reaction to first time med in ED No adverse reaction to first time med in ED/Patient baseline mental status/Awake

## 2020-08-23 NOTE — ED PROVIDER NOTE - ATTENDING CONTRIBUTION TO CARE
Attending MD Bailey:   I personally have seen and examined this patient.  ACP, Resident, medical student note reviewed and agree on plan of care and except where noted.    87y F PMH HTN transferred from [facility] for spontaneous ICH/SDH with possible early uncal herniation on CT, on nicardipine gtt for BP control.    On exam patient is well appearing, vitals wnl, rrr s1s2, lungs clear, abdomen soft, no neuro deficits, A+O x 2, fluid speech, skin warm and well perfused.    Will obtain immediate neurosurgery consult, repeat labs, imaging per neurosurgery, continue to monitor, admit.

## 2020-08-23 NOTE — ED PROVIDER NOTE - OBJECTIVE STATEMENT
87F with hx of HTN presenting as transfer from [place] with ICH on cardene w/possible early uncal herniation on CT. Patient AxO x 2 in the ED, denies all symptoms. No headache, changes in vision, nausea, vomiting.

## 2020-08-23 NOTE — ED PROVIDER NOTE - CLINICAL SUMMARY MEDICAL DECISION MAKING FREE TEXT BOX
Patient's CT shows a large intracranial hemorrhage into the subdural space and some early uncal herniation. Neurosurgery consulted at [facility]. Discussed with OMERO Rehman who accepted the patient and recommends CTA.

## 2020-08-23 NOTE — H&P ADULT - ASSESSMENT
Merry Caruso  87 F PMH of Breast Ca, HTN presents with headaches. CTH at OSH shows large R occiital Hemorrhage and Falacine SDH with some MLS. Wide awake, Ox2, EOMi, fc, YOUNG 5/5. Mildly confused. CTH shows Right temporo-occipital intraparenchymal hematoma, IVH and falcine SDH Briefly discussed possibility of worsening neurological exam which could be life threatening. Per sister who is HCP patient would not want surgery.  ICH score 3  - Admit to NSCU  - CTH in 6 hrs   - CTA negative  - TEG, coags, ARU, PRU  - ddavp  - Na goals 145-150

## 2020-08-24 PROBLEM — C50.919 MALIGNANT NEOPLASM OF UNSPECIFIED SITE OF UNSPECIFIED FEMALE BREAST: Chronic | Status: ACTIVE | Noted: 2018-03-24

## 2020-08-24 PROBLEM — I10 ESSENTIAL (PRIMARY) HYPERTENSION: Chronic | Status: ACTIVE | Noted: 2018-03-24

## 2020-08-24 LAB
A1C WITH ESTIMATED AVERAGE GLUCOSE RESULT: 5 % — SIGNIFICANT CHANGE UP (ref 4–5.6)
ANION GAP SERPL CALC-SCNC: 10 MMOL/L — SIGNIFICANT CHANGE UP (ref 5–17)
BLD GP AB SCN SERPL QL: NEGATIVE — SIGNIFICANT CHANGE UP
BLD GP AB SCN SERPL QL: NEGATIVE — SIGNIFICANT CHANGE UP
BUN SERPL-MCNC: 20 MG/DL — SIGNIFICANT CHANGE UP (ref 7–23)
CALCIUM SERPL-MCNC: 8.9 MG/DL — SIGNIFICANT CHANGE UP (ref 8.4–10.5)
CHLORIDE SERPL-SCNC: 104 MMOL/L — SIGNIFICANT CHANGE UP (ref 96–108)
CHOLEST SERPL-MCNC: 157 MG/DL — SIGNIFICANT CHANGE UP (ref 10–199)
CO2 SERPL-SCNC: 24 MMOL/L — SIGNIFICANT CHANGE UP (ref 22–31)
CREAT SERPL-MCNC: 0.66 MG/DL — SIGNIFICANT CHANGE UP (ref 0.5–1.3)
ESTIMATED AVERAGE GLUCOSE: 97 MG/DL — SIGNIFICANT CHANGE UP (ref 68–114)
GLUCOSE SERPL-MCNC: 122 MG/DL — HIGH (ref 70–99)
HDLC SERPL-MCNC: 59 MG/DL — SIGNIFICANT CHANGE UP
LIPID PNL WITH DIRECT LDL SERPL: 82 MG/DL — SIGNIFICANT CHANGE UP
POTASSIUM SERPL-MCNC: 3.6 MMOL/L — SIGNIFICANT CHANGE UP (ref 3.5–5.3)
POTASSIUM SERPL-SCNC: 3.6 MMOL/L — SIGNIFICANT CHANGE UP (ref 3.5–5.3)
RH IG SCN BLD-IMP: POSITIVE — SIGNIFICANT CHANGE UP
RH IG SCN BLD-IMP: POSITIVE — SIGNIFICANT CHANGE UP
SODIUM SERPL-SCNC: 138 MMOL/L — SIGNIFICANT CHANGE UP (ref 135–145)
T3 SERPL-MCNC: 65 NG/DL — LOW (ref 80–200)
T4 AB SER-ACNC: 5.5 UG/DL — SIGNIFICANT CHANGE UP (ref 4.6–12)
TOTAL CHOLESTEROL/HDL RATIO MEASUREMENT: 2.7 RATIO — LOW (ref 3.3–7.1)
TRIGL SERPL-MCNC: 81 MG/DL — SIGNIFICANT CHANGE UP (ref 10–149)
TSH SERPL-MCNC: 2.39 UIU/ML — SIGNIFICANT CHANGE UP (ref 0.27–4.2)

## 2020-08-24 PROCEDURE — 99233 SBSQ HOSP IP/OBS HIGH 50: CPT

## 2020-08-24 PROCEDURE — 93306 TTE W/DOPPLER COMPLETE: CPT | Mod: 26

## 2020-08-24 PROCEDURE — 70450 CT HEAD/BRAIN W/O DYE: CPT | Mod: 26

## 2020-08-24 RX ORDER — ACETAMINOPHEN 500 MG
1000 TABLET ORAL ONCE
Refills: 0 | Status: COMPLETED | OUTPATIENT
Start: 2020-08-24 | End: 2020-08-24

## 2020-08-24 RX ORDER — ENOXAPARIN SODIUM 100 MG/ML
40 INJECTION SUBCUTANEOUS
Refills: 0 | Status: DISCONTINUED | OUTPATIENT
Start: 2020-08-25 | End: 2020-08-27

## 2020-08-24 RX ORDER — ONDANSETRON 8 MG/1
4 TABLET, FILM COATED ORAL ONCE
Refills: 0 | Status: COMPLETED | OUTPATIENT
Start: 2020-08-24 | End: 2020-08-24

## 2020-08-24 RX ORDER — POTASSIUM CHLORIDE 20 MEQ
40 PACKET (EA) ORAL ONCE
Refills: 0 | Status: COMPLETED | OUTPATIENT
Start: 2020-08-24 | End: 2020-08-24

## 2020-08-24 RX ADMIN — Medication 40 MILLIEQUIVALENT(S): at 06:35

## 2020-08-24 RX ADMIN — SENNA PLUS 2 TABLET(S): 8.6 TABLET ORAL at 22:49

## 2020-08-24 RX ADMIN — TRAMADOL HYDROCHLORIDE 50 MILLIGRAM(S): 50 TABLET ORAL at 22:49

## 2020-08-24 RX ADMIN — TRAMADOL HYDROCHLORIDE 50 MILLIGRAM(S): 50 TABLET ORAL at 13:37

## 2020-08-24 RX ADMIN — ONDANSETRON 4 MILLIGRAM(S): 8 TABLET, FILM COATED ORAL at 13:30

## 2020-08-24 RX ADMIN — TRAMADOL HYDROCHLORIDE 25 MILLIGRAM(S): 50 TABLET ORAL at 05:00

## 2020-08-24 RX ADMIN — SODIUM CHLORIDE 50 MILLILITER(S): 5 INJECTION, SOLUTION INTRAVENOUS at 08:05

## 2020-08-24 RX ADMIN — Medication 400 MILLIGRAM(S): at 07:30

## 2020-08-24 RX ADMIN — Medication 1000 MILLIGRAM(S): at 07:45

## 2020-08-24 RX ADMIN — Medication 1000 MILLIGRAM(S): at 20:30

## 2020-08-24 RX ADMIN — TRAMADOL HYDROCHLORIDE 50 MILLIGRAM(S): 50 TABLET ORAL at 14:30

## 2020-08-24 RX ADMIN — TRAMADOL HYDROCHLORIDE 50 MILLIGRAM(S): 50 TABLET ORAL at 23:19

## 2020-08-24 RX ADMIN — TRAMADOL HYDROCHLORIDE 50 MILLIGRAM(S): 50 TABLET ORAL at 08:51

## 2020-08-24 RX ADMIN — Medication 400 MILLIGRAM(S): at 19:58

## 2020-08-24 RX ADMIN — ONDANSETRON 4 MILLIGRAM(S): 8 TABLET, FILM COATED ORAL at 19:58

## 2020-08-24 RX ADMIN — TRAMADOL HYDROCHLORIDE 50 MILLIGRAM(S): 50 TABLET ORAL at 09:30

## 2020-08-24 RX ADMIN — DEXMEDETOMIDINE HYDROCHLORIDE IN 0.9% SODIUM CHLORIDE 2.95 MICROGRAM(S)/KG/HR: 4 INJECTION INTRAVENOUS at 08:06

## 2020-08-24 RX ADMIN — TRAMADOL HYDROCHLORIDE 25 MILLIGRAM(S): 50 TABLET ORAL at 05:30

## 2020-08-24 RX ADMIN — TRAMADOL HYDROCHLORIDE 50 MILLIGRAM(S): 50 TABLET ORAL at 18:46

## 2020-08-24 RX ADMIN — POLYETHYLENE GLYCOL 3350 17 GRAM(S): 17 POWDER, FOR SOLUTION ORAL at 05:24

## 2020-08-24 NOTE — PHYSICAL THERAPY INITIAL EVALUATION ADULT - ADDITIONAL COMMENTS
Impression:    Unchanged right temporal occipital parenchymal hematoma with intraventricular extension and interhemispheric and right tentorial subdural hematoma. Slightly increasing right cerebral hemisphere sulcal effacement. Head CT 8/24/20 Impression: Unchanged right temporal occipital parenchymal hematoma with intraventricular extension and interhemispheric and right tentorial subdural hematoma. Slightly increasing right cerebral hemisphere sulcal effacement.  CT [facility] Head 8/23/20 IMPRESSION:  1. CTA brain: There is no large vessel occlusion or aneurysm involving major proximal intracranial arteries.  2. CTA NECK: There is no stenosis involving major neck arteries.

## 2020-08-24 NOTE — OCCUPATIONAL THERAPY INITIAL EVALUATION ADULT - PERTINENT HX OF CURRENT PROBLEM, REHAB EVAL
87F with hx of HTN presenting as transfer from [place] with ICH on cardene w/possible early uncal herniation on CT. Patient AxO x 2 in the ED, denies all symptoms. No headache, changes in vision, nausea, vomiting. CTH at OSH shows large R occiital Hemorrhage and Falacine SDH with some MLS.

## 2020-08-24 NOTE — OCCUPATIONAL THERAPY INITIAL EVALUATION ADULT - VISUAL ACUITY
unable to formally assess vision d/t confusion. pt reports being unable to see clock on wall. unable to identify numbers of fingers held up 1" from eyes

## 2020-08-24 NOTE — CONSULT NOTE ADULT - ASSESSMENT
86 yo female with PMH HTN, breast cancer (in remission) presenting as transfer from [place] with severe headache with right temporal-occipital hemorrhage with falcine SDH confirmed on CT, initially on cardene and admitted to NSCU. Patient now with improved mental status but continuing to have significant headache without other concerning symptoms. Patient is DNR/DNI so no surgical intervention is being pursued. Repeat head CT done early this morning, pending results.    Impression: headache 2/2 right occipital hemorrhage, falcine SDH    Recommendations:  [] f/u repeat head CT for stability of ICH  [] if CT stable, transfer to neurology stroke service for medical management given hemorrhage  [] currently getting IV tylenol for pain control, consider escalating if pain continues  [] repeat head CT if acute change in mental status     Case to be discussed with neurology stroke attending, Dr. Garcia.

## 2020-08-24 NOTE — PROGRESS NOTE ADULT - ASSESSMENT
87F R tempo-occipital heme with falcine SDH xfer from [place]  - per conversation w/ sister, patient is DNR/I, no Sx  - Medical mgmt

## 2020-08-24 NOTE — OCCUPATIONAL THERAPY INITIAL EVALUATION ADULT - PLANNED THERAPY INTERVENTIONS, OT EVAL
balance training/bed mobility training/strengthening/cognitive, visual perceptual/ADL retraining/transfer training

## 2020-08-24 NOTE — SPEECH LANGUAGE PATHOLOGY EVALUATION - SLP ORIENTATION
A0x3 at the time of this assessment; name/location (hospital) suspect pain negatively interfering w/ answering questions

## 2020-08-24 NOTE — SPEECH LANGUAGE PATHOLOGY EVALUATION - SLP PERTINENT HISTORY OF CURRENT PROBLEM
This is an 86 y/o F Iraqi speaking female with PMH HTN, breast cancer who presented as transfer from [place] with headache, found to have right occipital hemorrhage on CT head, as well as IVH and falcine SDH; was started on cardene and initially admitted to NSCU; (cortical intraparenchymal hemorrhage and subdural hematoma with cerebral edema and brain compression ). ICH score initially 3 on admission to ICU. Patient AxO x 2 in the ED, now improved in mentation, but continues to have right occipital headache that is "severe". Per prior charts, patient is DNR/DNI per sister and surgery is not being pursued. No neurosurgical intervention per family request. Passed dysphagia screen.

## 2020-08-24 NOTE — PHYSICAL THERAPY INITIAL EVALUATION ADULT - PERTINENT HX OF CURRENT PROBLEM, REHAB EVAL
86 yo F with hx of HTN presenting as transfer from [place] with ICH on cardene w/possible early uncal herniation on CT. Patient AxO x 2 in the ED, denies all symptoms. No headache, changes in vision, nausea, vomiting. CTH at OSH shows large R occipital Hemorrhage and Falacine SDH with some MLS.

## 2020-08-24 NOTE — SPEECH LANGUAGE PATHOLOGY EVALUATION - SLP DIAGNOSIS
Pt found to have right occipital hemorrhage on CT head, as well as IVH and falcine SDH. Limited ability to complete speech/language evaluation this date however appearing to be able to express wants/needs effectively in both English and Norwegian.

## 2020-08-24 NOTE — PHYSICAL THERAPY INITIAL EVALUATION ADULT - DIAGNOSIS, PT EVAL
Pt presents with pain, decreased strength, balance and endurance all impacting ability to perform ADLs and functional mobility.

## 2020-08-24 NOTE — PHYSICAL THERAPY INITIAL EVALUATION ADULT - GENERAL OBSERVATIONS, REHAB EVAL
Pt received sidelying in bed in NAD, +IVF [disconnected for session by DAYSI Kimble], +tele, +cont pulse ox, VSS, agreeable to participate in therapy at this time. Pt received sidelying in bed in NAD, +IVF [disconnected for session by DAYSI Kimble], +tele, +BP cuff, +cont pulse ox, VSS, agreeable to participate in therapy at this time.

## 2020-08-24 NOTE — PROGRESS NOTE ADULT - SUBJECTIVE AND OBJECTIVE BOX
Patient seen and examined at bedside.    --Anticoagulation--    T(C): 37.1 (08-23-20 @ 23:00), Max: 37.2 (08-23-20 @ 20:15)  HR: 57 (08-23-20 @ 23:45) (57 - 103)  BP: 100/43 (08-23-20 @ 23:45) (100/43 - 148/66)  RR: 17 (08-23-20 @ 23:45) (14 - 26)  SpO2: 97% (08-23-20 @ 23:45) (96% - 100%)  Wt(kg): --    Exam: Oriented to name, FC, YOUNG strongly AG

## 2020-08-24 NOTE — OCCUPATIONAL THERAPY INITIAL EVALUATION ADULT - DIAGNOSIS, OT EVAL
Pt p/w impairments in balance, strength, coordination, vision, cognition impacting independence with ADLs and functional mobility.

## 2020-08-24 NOTE — OCCUPATIONAL THERAPY INITIAL EVALUATION ADULT - LIVES WITH, PROFILE
Pt reports having roommate in apartment with 0 OLENA and elevator access. Pt reports using BHC and RW PTA. Pt reports independence in ADLs.

## 2020-08-24 NOTE — SPEECH LANGUAGE PATHOLOGY EVALUATION - COMMENTS
Limited assessment of auditory comprehension skills as pt presenting w/ 10 out of 10 pain in relation to headache. Recently received pain medication as per nursing. Communicated pain level to RN. Limited assessment of verbal expression skills as pt presenting w/ 10 out of 10 pain in relation to headache.  Pt did communicate her name verbally and spell it aloud. Limited assessment of non-verbal expression skills as pt presenting w/ 10 out of 10 pain in relation to headache. Not able to assess Limited assessment of cognitive linguistic skills as pt presenting w/ 10 out of 10 pain in relation to headache. Not able to assess due to level of pain  No groping.

## 2020-08-24 NOTE — PROGRESS NOTE ADULT - SUBJECTIVE AND OBJECTIVE BOX
SUMMARY:HPI:  · HPI Objective Statement: 87F with hx of HTN presenting as transfer from [place] with ICH on cardene w/possible early uncal herniation on CT. Patient AxO x 2 in the ED, denies all symptoms. No headache, changes in vision, nausea, vomiting. (23 Aug 2020 18:16)        OVERNIGHT EVENTS: none   HA this morning     ADMISSION SCORE:  ICH Score: 1 (ICH ~20cc, GCS on presentation 14 [reported], Age >80, no IVH, cortical hemorrhage)   SEDATION SCORES:  RASS: CAM-ICU:     REVIEW OF SYSTEMS:    VITALS: [] Reviewed    IMAGING/DATA: [] Reviewed    IVF FLUIDS/MEDICATIONS: [] Reviewed    ALLERGIES: Allergies    No Known Allergies    Intolerances          EXAMINATION:  General: No acute distress  HEENT: Anicteric sclerae  Cardiac: [id number]  Lungs: Clear  Abdomen: Soft, non-tender, +BS  Extremities: No c/c/e  Skin/Incision Site: Clean, dry and intact  Neurologic: Awakens to voice, oriented to self, hospital (but thinks we are in [place]), month/year (but not exact date), poor attention requiring redirection, follows commands with delay and requires reminders to follow through with commands, possible decreased blink to threat on the left but patient unable to cooperate with visual field testing, face symmetric, all limbs symmetric and 4+/5 without drift (effort related motor exam)

## 2020-08-24 NOTE — PROGRESS NOTE ADULT - ASSESSMENT
ASSESSMENT/PLAN: cortical intraparenchymal hemorrhage and subdural hematoma with cerebral edema and brain compression    NEURO:  No neurosurgical intervention per family request  Cerebral edema:  optimize Na+ (goal 140-150)  Q4 neuro  checks   stable am scan  Continued goals of care discussion with family  pain control : tramadol Dilaudid PRN   Delirium precautions  Activity: [] mobilize as tolerated [x] Bedrest [] PT [] OT [] PMNR    PULM:  Aspiration precautions  DNI    CV:  SBP goal 100-160mmHg  Nicardipine gtt is off     RENAL:  Fluids: IVL     GI:  Diet: regular diet   Bowel regimen     ENDO:   Goal euglycemia (-180)    HEME/ONC:  VTE prophylaxis: [x] SCDs [] chemoprophylaxis [x] hold chemoprophylaxis due to: intracerebral hemorrhage [] high risk of DVT/PE on admission due to:    ID:  Monitor for fever    SOCIAL/FAMILY:  [x] awaiting [] updated via telephone [] family meeting  Sister Magui: [phone]    CODE STATUS:  [] Full Code [x] DNR [x] DNI [] Palliative/Comfort Care    DISPOSITION:  [] ICU [x] Stroke Unit [] Floor [] EMU [] RCU [] PCU    [x] Patient is at high risk of neurologic deterioration/death due to: cerebral edema with brain compression ASSESSMENT/PLAN: cortical intraparenchymal hemorrhage and subdural hematoma with cerebral edema and brain compression    NEURO:  No neurosurgical intervention per family request  Cerebral edema:  optimize Na+ (goal 140-150)  Q4 neuro  checks   stable am scan  Continued goals of care discussion with family  pain control : tramadol Dilaudid PRN   Delirium precautions  Activity: [] mobilize as tolerated [x] Bedrest [] PT [] OT [] PMNR    PULM:  Aspiration precautions  DNI    CV:  SBP goal 100-160mmHg  Nicardipine gtt is off     RENAL:  Fluids: IVL     GI:  Diet: regular diet   Bowel regimen     ENDO:   Goal euglycemia (-180)    HEME/ONC:  VTE prophylaxis: [x] SCDs [] chemoprophylaxis [x] hold chemoprophylaxis due to: intracerebral hemorrhage [] high risk of DVT/PE on admission due to:    ID:  Monitor for fever    SOCIAL/FAMILY:  [x] awaiting [] updated via telephone [] family meeting  Sister Magui: [phone]    CODE STATUS:  [] Full Code [x] DNR [x] DNI [] Palliative/Comfort Care    DISPOSITION:  [] ICU [] Stroke Unit [x] Floor [] EMU [] RCU [] PCU

## 2020-08-24 NOTE — PHYSICAL THERAPY INITIAL EVALUATION ADULT - LIVES WITH, PROFILE
Pt reports having roommate in apartment with 0 OLENA and elevator access. Pt reports using cane and RW PTA. Pt reports independence in ADLs and functional mobility.

## 2020-08-25 ENCOUNTER — TRANSCRIPTION ENCOUNTER (OUTPATIENT)
Age: 85
End: 2020-08-25

## 2020-08-25 DIAGNOSIS — I61.9 NONTRAUMATIC INTRACEREBRAL HEMORRHAGE, UNSPECIFIED: ICD-10-CM

## 2020-08-25 DIAGNOSIS — I10 ESSENTIAL (PRIMARY) HYPERTENSION: ICD-10-CM

## 2020-08-25 LAB
ANION GAP SERPL CALC-SCNC: 12 MMOL/L — SIGNIFICANT CHANGE UP (ref 5–17)
BASOPHILS # BLD AUTO: 0.02 K/UL — SIGNIFICANT CHANGE UP (ref 0–0.2)
BASOPHILS NFR BLD AUTO: 0.3 % — SIGNIFICANT CHANGE UP (ref 0–2)
BUN SERPL-MCNC: 22 MG/DL — SIGNIFICANT CHANGE UP (ref 7–23)
CALCIUM SERPL-MCNC: 9.8 MG/DL — SIGNIFICANT CHANGE UP (ref 8.4–10.5)
CHLORIDE SERPL-SCNC: 100 MMOL/L — SIGNIFICANT CHANGE UP (ref 96–108)
CO2 SERPL-SCNC: 23 MMOL/L — SIGNIFICANT CHANGE UP (ref 22–31)
CREAT SERPL-MCNC: 0.8 MG/DL — SIGNIFICANT CHANGE UP (ref 0.5–1.3)
EOSINOPHIL # BLD AUTO: 0.02 K/UL — SIGNIFICANT CHANGE UP (ref 0–0.5)
EOSINOPHIL NFR BLD AUTO: 0.3 % — SIGNIFICANT CHANGE UP (ref 0–6)
GLUCOSE SERPL-MCNC: 99 MG/DL — SIGNIFICANT CHANGE UP (ref 70–99)
HCT VFR BLD CALC: 36.4 % — SIGNIFICANT CHANGE UP (ref 34.5–45)
HGB BLD-MCNC: 11.3 G/DL — LOW (ref 11.5–15.5)
IMM GRANULOCYTES NFR BLD AUTO: 0.4 % — SIGNIFICANT CHANGE UP (ref 0–1.5)
LYMPHOCYTES # BLD AUTO: 1.47 K/UL — SIGNIFICANT CHANGE UP (ref 1–3.3)
LYMPHOCYTES # BLD AUTO: 21.2 % — SIGNIFICANT CHANGE UP (ref 13–44)
MCHC RBC-ENTMCNC: 28.4 PG — SIGNIFICANT CHANGE UP (ref 27–34)
MCHC RBC-ENTMCNC: 31 GM/DL — LOW (ref 32–36)
MCV RBC AUTO: 91.5 FL — SIGNIFICANT CHANGE UP (ref 80–100)
MONOCYTES # BLD AUTO: 0.52 K/UL — SIGNIFICANT CHANGE UP (ref 0–0.9)
MONOCYTES NFR BLD AUTO: 7.5 % — SIGNIFICANT CHANGE UP (ref 2–14)
NEUTROPHILS # BLD AUTO: 4.89 K/UL — SIGNIFICANT CHANGE UP (ref 1.8–7.4)
NEUTROPHILS NFR BLD AUTO: 70.3 % — SIGNIFICANT CHANGE UP (ref 43–77)
NRBC # BLD: 0 /100 WBCS — SIGNIFICANT CHANGE UP (ref 0–0)
PLATELET # BLD AUTO: 263 K/UL — SIGNIFICANT CHANGE UP (ref 150–400)
POTASSIUM SERPL-MCNC: 3.8 MMOL/L — SIGNIFICANT CHANGE UP (ref 3.5–5.3)
POTASSIUM SERPL-SCNC: 3.8 MMOL/L — SIGNIFICANT CHANGE UP (ref 3.5–5.3)
RBC # BLD: 3.98 M/UL — SIGNIFICANT CHANGE UP (ref 3.8–5.2)
RBC # FLD: 14.9 % — HIGH (ref 10.3–14.5)
SODIUM SERPL-SCNC: 135 MMOL/L — SIGNIFICANT CHANGE UP (ref 135–145)
WBC # BLD: 6.95 K/UL — SIGNIFICANT CHANGE UP (ref 3.8–10.5)
WBC # FLD AUTO: 6.95 K/UL — SIGNIFICANT CHANGE UP (ref 3.8–10.5)

## 2020-08-25 PROCEDURE — 70450 CT HEAD/BRAIN W/O DYE: CPT | Mod: 26

## 2020-08-25 PROCEDURE — 93970 EXTREMITY STUDY: CPT | Mod: 26

## 2020-08-25 RX ORDER — ACETAMINOPHEN 500 MG
1000 TABLET ORAL ONCE
Refills: 0 | Status: COMPLETED | OUTPATIENT
Start: 2020-08-25 | End: 2020-08-25

## 2020-08-25 RX ORDER — ONDANSETRON 8 MG/1
4 TABLET, FILM COATED ORAL ONCE
Refills: 0 | Status: COMPLETED | OUTPATIENT
Start: 2020-08-25 | End: 2020-08-25

## 2020-08-25 RX ORDER — LISINOPRIL 2.5 MG/1
10 TABLET ORAL DAILY
Refills: 0 | Status: DISCONTINUED | OUTPATIENT
Start: 2020-08-25 | End: 2020-08-25

## 2020-08-25 RX ORDER — LISINOPRIL 2.5 MG/1
20 TABLET ORAL DAILY
Refills: 0 | Status: DISCONTINUED | OUTPATIENT
Start: 2020-08-25 | End: 2020-08-25

## 2020-08-25 RX ORDER — LISINOPRIL 2.5 MG/1
20 TABLET ORAL
Refills: 0 | Status: DISCONTINUED | OUTPATIENT
Start: 2020-08-25 | End: 2020-08-26

## 2020-08-25 RX ORDER — HYDRALAZINE HCL 50 MG
10 TABLET ORAL
Refills: 0 | Status: DISCONTINUED | OUTPATIENT
Start: 2020-08-25 | End: 2020-08-27

## 2020-08-25 RX ADMIN — POLYETHYLENE GLYCOL 3350 17 GRAM(S): 17 POWDER, FOR SOLUTION ORAL at 16:30

## 2020-08-25 RX ADMIN — TRAMADOL HYDROCHLORIDE 50 MILLIGRAM(S): 50 TABLET ORAL at 06:00

## 2020-08-25 RX ADMIN — ONDANSETRON 4 MILLIGRAM(S): 8 TABLET, FILM COATED ORAL at 05:51

## 2020-08-25 RX ADMIN — LISINOPRIL 20 MILLIGRAM(S): 2.5 TABLET ORAL at 09:50

## 2020-08-25 RX ADMIN — Medication 10 MILLIGRAM(S): at 23:57

## 2020-08-25 RX ADMIN — LISINOPRIL 20 MILLIGRAM(S): 2.5 TABLET ORAL at 16:31

## 2020-08-25 RX ADMIN — Medication 400 MILLIGRAM(S): at 20:45

## 2020-08-25 RX ADMIN — Medication 400 MILLIGRAM(S): at 10:55

## 2020-08-25 RX ADMIN — TRAMADOL HYDROCHLORIDE 50 MILLIGRAM(S): 50 TABLET ORAL at 08:28

## 2020-08-25 RX ADMIN — ENOXAPARIN SODIUM 40 MILLIGRAM(S): 100 INJECTION SUBCUTANEOUS at 16:31

## 2020-08-25 RX ADMIN — Medication 1000 MILLIGRAM(S): at 21:15

## 2020-08-25 RX ADMIN — SENNA PLUS 2 TABLET(S): 8.6 TABLET ORAL at 22:29

## 2020-08-25 RX ADMIN — TRAMADOL HYDROCHLORIDE 50 MILLIGRAM(S): 50 TABLET ORAL at 22:59

## 2020-08-25 RX ADMIN — TRAMADOL HYDROCHLORIDE 50 MILLIGRAM(S): 50 TABLET ORAL at 22:29

## 2020-08-25 RX ADMIN — TRAMADOL HYDROCHLORIDE 50 MILLIGRAM(S): 50 TABLET ORAL at 05:30

## 2020-08-25 RX ADMIN — Medication 1000 MILLIGRAM(S): at 11:18

## 2020-08-25 RX ADMIN — TRAMADOL HYDROCHLORIDE 25 MILLIGRAM(S): 50 TABLET ORAL at 16:30

## 2020-08-25 RX ADMIN — TRAMADOL HYDROCHLORIDE 50 MILLIGRAM(S): 50 TABLET ORAL at 09:53

## 2020-08-25 NOTE — PROGRESS NOTE ADULT - SUBJECTIVE AND OBJECTIVE BOX
THE PATIENT WAS SEEN AND EXAMINED BY ME WITH THE HOUSESTAFF AND STROKE TEAM DURING MORNING ROUNDS.   HPI:  87F with hx of HTN presented as transfer from [place] with ICH on cardene w/possible early uncal herniation on CT. Initially managed in ICU now transfer to stroke unit for further care.    SUBJECTIVE: Reported headache and vomiting overnight, also c/o LE pain.  ROS reported negative unless otherwise noted.    acetaminophen   Tablet .. 650 milliGRAM(s) Oral every 6 hours PRN  chlorhexidine 4% Liquid 1 Application(s) Topical <User Schedule>  enoxaparin Injectable 40 milliGRAM(s) SubCutaneous <User Schedule>  hydrALAZINE Injectable 10 milliGRAM(s) IV Push every 3 hours PRN  lisinopril 20 milliGRAM(s) Oral two times a day  polyethylene glycol 3350 17 Gram(s) Oral two times a day  senna 2 Tablet(s) Oral at bedtime  traMADol 25 milliGRAM(s) Oral every 4 hours PRN  traMADol 50 milliGRAM(s) Oral every 4 hours PRN      PHYSICAL EXAM:   Vital Signs Last 24 Hrs  T(C): 36.6 (25 Aug 2020 12:14), Max: 36.9 (24 Aug 2020 23:54)  T(F): 97.9 (25 Aug 2020 12:14), Max: 98.4 (24 Aug 2020 23:54)  HR: 52 (25 Aug 2020 12:14) (48 - 82)  BP: 152/71 (25 Aug 2020 12:14) (133/69 - 191/115)  BP(mean): 69 (24 Aug 2020 14:00) (69 - 69)  RR: 18 (25 Aug 2020 12:14) (15 - 18)  SpO2: 94% (25 Aug 2020 12:14) (94% - 98%)    General: uncomfortable , exam limited due to discomfort   HEENT: EOM appear intact   Abdomen: Soft, nontender, nondistended   Extremities: No edema    NEUROLOGICAL EXAM:  Mental status: Awake, alert,  oriented to person,  place, time. able to follow simple commands   Cranial Nerves: No facial asymmetry appreciated, no dysarthria,  tongue midline  Motor exam:  moves extremities against gravity within bed   Sensation: Intact to light touch   Coordination/ Gait: gait not assessed   LABS:                        11.3   6.95  )-----------( 263      ( 25 Aug 2020 06:46 )             36.4    08-25    135  |  100  |  22  ----------------------------<  99  3.8   |  23  |  0.80    Ca    9.8      25 Aug 2020 06:45  Phos  2.6     08-23  Mg     1.8     08-23    TPro  6.9  /  Alb  4.0  /  TBili  0.6  /  DBili  x   /  AST  30  /  ALT  9<L>  /  AlkPhos  74  08-23  PT/INR - ( 23 Aug 2020 18:04 )   PT: 11.0 sec;   INR: 0.92 ratio         PTT - ( 23 Aug 2020 18:04 )  PTT:34.5 sec      IMAGING: Reviewed by me.     CT Head No Cont (08.25.20)  Heterogeneous area of high attenuation on the right posterior temporal/occipital/parietal region. Surrounding edema is identified. This compatible with an area of acute parenchymal hemorrhage. Thisfinding measures approximately 6.5 x 4.2 cm and previously measured approximately 7.2 x 3.5 cm on prior head CT performed on August 24, 2020.  Extra-axial high attenuation involving the right posterior temporal region is again seen, this is likely compatible with an area of acute subdural hematoma. This finding measures approximately 1.4 cm widest diameter and previously measured approximately 1.3 cm widest diameter.  Acute subdural hematoma along the interhemispheric region on the right sideis again seen. This finding measures approximately 0.2 cm widest diameter and previously measured approximately 0.5 cm widest diameter.  No significant shift or herniation is seen.  Localized mass effect consisting of sulcal effacement is seen.    CT Head No Cont (08.24.20)  Evidence of a right temporal parietal occipital acute hemorrhage as identified on the prior imaging study. On the current evaluation slight increase in the midline shift now measuring 4.5 mm previously measuring 3.5 mm. Questionable subtle slight increased mass effect on the right lateral ventricle. Minimal evidence of early uncal herniation. Intraventricular hemorrhage is seen in the occipital horn of the right lateral ventricle    (08.23.20)  1. CTA brain: There is no large vessel occlusion or aneurysm involving major proximal intracranial arteries.  2. CTA NECK: There is no stenosis involving major neck arteries.

## 2020-08-25 NOTE — PROVIDER CONTACT NOTE (OTHER) - ASSESSMENT
patient is A&O x4, NDx4; however complaining of severe head pain.
patient complaining of significant head pain, some leg pain, and it is not being helped as much by pain medications at this time.

## 2020-08-25 NOTE — DISCHARGE NOTE PROVIDER - CARE PROVIDER_API CALL
Ryan Garcia  NEUROLOGY  [address] [facility], [address]  [place] [address]  Phone: [phone]  Fax: [phone]  Follow Up Time: 2 weeks    Nino Alcantara  CARDIOLOGY  [address] [place] [address], [address]  [place] [address]  Phone: [phone]  Fax: [phone]  Follow Up Time: 2 weeks

## 2020-08-25 NOTE — PROVIDER CONTACT NOTE (OTHER) - BACKGROUND
s/p brain bleed of ICH, IPH, and a SDH. initially in the NSCU on a cardene drip
p/w HA, Right ICH with IPH

## 2020-08-25 NOTE — CONSULT NOTE ADULT - ASSESSMENT
86 yo female with PMH HTN, breast cancer (in remission) presenting as transfer from [place] with severe headache with right temporal-occipital hemorrhage with falcine SDH confirmed on CT, initially on cardene and admitted to NSCU. Patient now with improved mental status but continuing to have significant headache without other concerning symptoms. Patient is DNR/DNI so no surgical intervention is being pursued. Repeat head CT done early this morning, pending results.

## 2020-08-25 NOTE — CONSULT NOTE ADULT - SUBJECTIVE AND OBJECTIVE BOX
CHIEF COMPLAINT:    HISTORY OF PRESENT ILLNESS: 87F with hx of HTN presenting as transfer from [place] with ICH on cardene w/possible early uncal herniation on CT. Patient AxO x 2 in the ED, denies all symptoms. No headache, changes in vision, nausea, vomiting.  ICH Score: 1 (ICH ~20cc, GCS on presentation 14 [reported], Age >80, no IVH, cortical hemorrhage)   PAST MEDICAL & SURGICAL HISTORY:  Breast cancer: right s/p resection  Essential hypertension  Breast CA  HTN (hypertension)  No significant past surgical history  H/O mastectomy  History of colon resection          MEDICATIONS:  enoxaparin Injectable 40 milliGRAM(s) SubCutaneous <User Schedule>  lisinopril 20 milliGRAM(s) Oral daily        acetaminophen   Tablet .. 650 milliGRAM(s) Oral every 6 hours PRN  traMADol 25 milliGRAM(s) Oral every 4 hours PRN  traMADol 50 milliGRAM(s) Oral every 4 hours PRN    polyethylene glycol 3350 17 Gram(s) Oral two times a day  senna 2 Tablet(s) Oral at bedtime      chlorhexidine 4% Liquid 1 Application(s) Topical <User Schedule>      FAMILY HISTORY:      SOCIAL HISTORY:    [ ] Non-smoker  [ ] Smoker  [ ] Alcohol    Allergies    No Known Allergies    Intolerances    	    REVIEW OF SYSTEMS:  CONSTITUTIONAL: No fever, weight loss, or fatigue  EYES: No eye pain, visual disturbances, or discharge  ENMT:  No difficulty hearing, tinnitus, vertigo; No sinus or throat pain  NECK: No pain or stiffness  RESPIRATORY: No cough, wheezing, chills or hemoptysis; No Shortness of Breath  CARDIOVASCULAR: No chest pain, palpitations, passing out, dizziness, or leg swelling  GASTROINTESTINAL: No abdominal or epigastric pain. No nausea, vomiting, or hematemesis; No diarrhea or constipation. No melena or hematochezia.  GENITOURINARY: No dysuria, frequency, hematuria, or incontinence  NEUROLOGICAL: No headaches, memory loss, loss of strength, numbness, or tremors  SKIN: No itching, burning, rashes, or lesions   LYMPH Nodes: No enlarged glands  ENDOCRINE: No heat or cold intolerance; No hair loss  MUSCULOSKELETAL: No joint pain or swelling; No muscle, back, or extremity pain  PSYCHIATRIC: No depression, anxiety, mood swings, or difficulty sleeping  HEME/LYMPH: No easy bruising, or bleeding gums  ALLERY AND IMMUNOLOGIC: No hives or eczema	    [ ] All others negative	  [ ] Unable to obtain    PHYSICAL EXAM:  T(C): 36.7 (08-25-20 @ 08:17), Max: 36.9 (08-24-20 @ 23:54)  HR: 48 (08-25-20 @ 11:16) (48 - 82)  BP: 167/65 (08-25-20 @ 11:16) (133/69 - 191/115)  RR: 18 (08-25-20 @ 11:16) (15 - 18)  SpO2: 96% (08-25-20 @ 11:16) (95% - 98%)  Wt(kg): --  I&O's Summary    24 Aug 2020 07:01  -  25 Aug 2020 07:00  --------------------------------------------------------  IN: 833 mL / OUT: 650 mL / NET: 183 mL        Appearance: NAD lethargic   HEENT: Dry oral mucosa, PERRL, EOMI	  Lymphatic: No lymphadenopathy  Cardiovascular: Normal S1 S2, No JVD, No murmurs, No edema  Respiratory: Lungs clear to auscultation	  Psychiatry: A & O x 3,sleepy and lethargic appropriate  Gastrointestinal:  Soft, Non-tender, + BS	  Skin: No rashes, No ecchymoses, No cyanosis	  Extremities: Normal range of motion, No clubbing, cyanosis or edema  Vascular: Peripheral pulses palpable 2+ bilaterally  Neurologic:  - Mental Status:  arousable to voice, and requires continued stimulation; oriented to person, place, and time; Speech is fluent when answering questions, able to repeat and follow simple commands. Headache limits cooperation with additional mental status testing; no extinction or neglect noted;   - Cranial Nerves II-XII:  VFF, No nystagmus noted, EOMI, PERRLA, V1-V3 intact, no facial asymmetry, t/p midline, SCM/trap intact.  - Motor:  Normal muscle bulk and tone throughout. No pronator drift noted. No myoclonus or tremor.  - Reflexes: 2+ biceps, brachioradialis, patellar, ankle reflexes. Plantar resp  TELEMETRY: 	    ECG:  	NSR, non specific stt changes   < from: Transthoracic Echocardiogram (08.24.20 @ 14:22) >    Patient name: CHARLIE DE LA ROSA  YOB: 1933   Age: 87 (F)   MR#: [id number]  Study Date: 8/24/2020  Location: O/PSonographer: SRIRAM Motta  Study quality: Technically fair  Referring Physician: Faustino Martinez MD  Height: 165 cm  Weight: 59 kg  BSA: 1.7 m2  ------------------------------------------------------------------------  PROCEDURE: Transthoracic echocardiogram with 2-D, M-Mode  and complete spectral and color flow Doppler.  INDICATION: Edema, unspecified (R60.9)  ------------------------------------------------------------------------  Dimensions:    Normal Values:  LA:     2.6    2.0 - 4.0 cm  Ao:     3.0    2.0 - 3.8 cm  SEPTUM: 1.1    0.6 - 1.2 cm  PWT:    1.0    0.6 - 1.1 cm  LVIDd:  4.0    3.0 - 5.6 cm  LVIDs:  2.9    1.8 - 4.0 cm  Derived variables:  LVMI: 83 g/m2  RWT: 0.50  Fractional short: 28 %  EF (Visual Estimate): 55 %  Doppler Peak Velocity (m/sec): AoV=1.3  ------------------------------------------------------------------------  Observations:  Mitral Valve: Mitral annular calcification and calcified  mitral leaflets with normal diastolic opening. Minimal  mitral regurgitation. Mild diastolic doming of the mitral  valve leaflets.  Aortic Valve/Aorta: Calcified trileaflet aortic valve with  normal opening. Peak transaortic valve gradient equals 7 mm  Hg. Peak left ventricular outflow tract gradient equals 2  mm Hg.  Aortic Root: 3 cm.  Left Atrium: Normal left atrium.  Left Ventricle: Normal left ventricular systolic function.  No segmental wall motion abnormalities. Increased relative  wall thickness with normal left ventricular mass index,  consistent with concentric left ventricular remodeling.  Reversal of the E-A  waves of the mitral inflow pattern is  consistent with diastolic LV dysfunction.  Right Heart: Normal right atrium. Normal right ventricular  size and function. Normal tricuspid valve. Normal pulmonic  valve. Minimal pulmonic regurgitation.  Pericardium/Pleura: Normal pericardium with trace  pericardial effusion.  Hemodynamic: Estimated right atrial pressure is 8 mm Hg.  ------------------------------------------------------------------------  Conclusions:  1. Mild diastolic doming of the mitral valve leaflets.  2. Calcified trileaflet aortic valve with normal opening.  3. Increased relative wall thickness with normal left  ventricular mass index, consistent with concentric left  ventricular remodeling.  4. Normal left ventricular systolic function. No segmental  wall motion abnormalities.  5. Reversal of the E-A  waves of the mitral inflow pattern  is consistent with diastolic LV dysfunction.  6. Normal right ventricular size and function.  *** No previous Echo exam.  ------------------------------------------------------------------------  Confirmed on  8/24/2020 - 19:25:50 by Abad Waddell M.D.  ------------------------------------------------------------------------    < end of copied text >    RADIOLOGY:  < from: CT Angio Head w/ IV Cont (08.23.20 @ 17:02) >    EXAM:  CT ANGIO NECK (W)AW IC                          EXAM:  CT ANGIO BRAIN (W)AW IC                            PROCEDURE DATE:  08/23/2020          INTERPRETATION:  CT ANGIOGRAM NECK AND BRAIN WITH CONTRAST    COMPARISON: None currently submittedfor comparison.    CLINICAL INFORMATION: Intracranial hemorrhage.    TECHNIQUE: Volumetric acquisition was performed from the thoracic inlet to the vertex.  A total of 90 cc of Omnipaque 350 was injected intravenously without complications.  Dose optimization techniques were utilized including kVp/mA modulation along with iterative reconstructions.  MIP image analysis was performed on a dedicated workstation. DLP: 2254 mGycm    FINDINGS:    NECK:  The aortic arch is conventional in anatomy. Origin of supraaortic arteries are patent. The common carotid arteries are normal in course and caliber. There are atherosclerotic plaques at common carotid bifurcations and carotid bulbs without evidence of significant segmental stenosis.    The right internal carotid artery follows a retropharyngeal course in the neck. The right internal carotid artery is normal in caliber. There is 0 % stenosis using NASCET criteria (normal right ICA caliber is 4.6 mm).    The left internal carotid artery is normal in caliber. There is 0 % stenosis using NASCET criteria (normal left ICA caliber is 4.6 mm).    Bilateral vertebral arteries are normal in course, caliber and contour, without evidence of dissection or occlusion.    Degenerative changes of the bony cervical spine are noted.    BRAIN:  The petrocavernous and supraclinoid ICA segments are normal in caliber.  The visualized MCA and LEE branches are normal without evidence of stenosis or aneurysm. The ACOM is present. Bilateral posterior communicatingarteries are present. The intradural segment of the left vertebral artery, V4 demonstrates a fusiform dilatation for approximately 0.5 cm and then decreases in caliber before it joins the right vertebral into the basilar. Otherwise the vertebral caliber is normal without focal dilatation to suggest aneurysm or high-grade narrowing. The cerebellar arteries, basilar and bilateral posterior cerebral arteries are patent without evidence of stenoses or aneurysm.      Other findings: None.    IMPRESSION:    1. CTA brain: There is no large vessel occlusion or aneurysm involving major proximal intracranial arteries.    2. CTA NECK: There is no stenosis involving major neck arteries.    NASCET CAROTID STENOSIS CRITERIA (distal normal appearing ICA as denominator for measurement): 0%-none, 1-49%-mild, 50-70%-moderate, 70-89%-severe, 90-99%-critical.      BÁRBARA STERLING M.D., ATTENDING RADIOLOGIST  This document has been electronically signed. Aug 23 2020  5:14PM                < from: CT Head No Cont (08.24.20 @ 08:09) >    EXAM:  CT BRAIN                            PROCEDURE DATE:  08/24/2020            INTERPRETATION:  INDICATIONS:  f/u heme    TECHNIQUE:  Serial axial images were obtained from the skull base to the vertex without intravenous contrast.    COMPARISONEXAMINATION: 8/23/2020    FINDINGS:  VENTRICLES AND SULCI: Evidence of a right temporal parietal occipital acute hemorrhage as identified on the prior imaging study. On the current evaluation slight increase in the midline shift now measuring 4.5 mm previously measuring 3.5 mm. Questionable subtle slight increased mass effect on the right lateral ventricle. Minimal evidence of early uncal herniation. Intraventricular hemorrhage is seen in the occipital horn of the right lateral ventricle  INTRA-AXIAL: No significant extension of the hemorrhage or evidence of rehemorrhage .  EXTRA-AXIAL: Trace hemorrhage is seen along the right tentorial leaf as described previously slightly less conspicuous compared with the prior  VISUALIZED SINUSES:  Clear.  VISUALIZED MASTOIDS:  Clear.  CALVARIUM:  Normal.  MISCELLANEOUS:  None.    IMPRESSION:  Slight increased mass effect on the right lateral ventricle compared with the prior study. No evidence of rehemorrhage. Study is otherwise unchanged 8/23/2020                  DIRK AVENDAÑO M.D., ATTENDING RADIOLOGIST  This document has been electronically signed. Aug 24 2020  9:18AM                < end of copied text >      OTHER: 	  	  LABS:	 	    CARDIAC MARKERS:                                  11.3   6.95  )-----------( 263      ( 25 Aug 2020 06:46 )             36.4     08-25    135  |  100  |  22  ----------------------------<  99  3.8   |  23  |  0.80    Ca    9.8      25 Aug 2020 06:45  Phos  2.6     08-23  Mg     1.8     08-23    TPro  6.9  /  Alb  4.0  /  TBili  0.6  /  DBili  x   /  AST  30  /  ALT  9<L>  /  AlkPhos  74  08-23    proBNP:   Lipid Profile:   HgA1c:   TSH:

## 2020-08-25 NOTE — DISCHARGE NOTE PROVIDER - PROVIDER TOKENS
PROVIDER:[TOKEN:[[id number]],FOLLOWUP:[2 weeks]],PROVIDER:[TOKEN:[[id number]],FOLLOWUP:[2 weeks]]

## 2020-08-25 NOTE — PROVIDER CONTACT NOTE (OTHER) - SITUATION
Pt has been hypertensive most of the night, but in the 160s-170s. Given lisinopril 20mg for blood pressure of 168/79 and increased within the hour to 190/115
Patient had one episode of emesis, /66, 10/10 head pain.

## 2020-08-25 NOTE — PROVIDER CONTACT NOTE (OTHER) - REASON
Pt hypertensive at 190/115 before going to CT;
Patient had one episode of emesis, /66, 10/10 head pain.

## 2020-08-25 NOTE — DISCHARGE NOTE PROVIDER - NSDCCPCAREPLAN_GEN_ALL_CORE_FT
PRINCIPAL DISCHARGE DIAGNOSIS  Diagnosis: ICH (intracerebral hemorrhage)  Assessment and Plan of Treatment: Please follow up with neurologist in 1 week. Continue taking medications as prescribed. If you were prescribed a statin for your cholesterol please make sure you have your liver enzymes checked with your primary care physician. Monitor your blood pressure. Reduce fat, cholesterol and salt in your diet. Increase intake of fruits and vegetables. Limit alcohol to minimum and do not smoke. You may be at risk for falling, make changes to your home to help you walk easier. Keep up to date on vaccinations.  If you experience any symptoms of facial drooping, slurred speech, arm or leg weakness, severe headache, vision changes or any worsening symptoms, notify provider immediately and return to ER.

## 2020-08-25 NOTE — PROGRESS NOTE ADULT - ASSESSMENT
ASSESSMENT: 86 yo female with PMH HTN, breast cancer (in remission) presented as transfer from [place] with severe headache with right temporal-occipital hemorrhage with falcine SDH confirmed on CT, initially on cardene and admitted to [facility]. Patient now with improved mental status but continuing to have significant headache without other concerning symptoms. Patient is DNR/DNI so no surgical intervention is being pursued. Repeat head CT done early this morning, pending results.    Impression:  Right posterior temporal/occipital/parietal IPH, right posterior temporal and interhemispheric acute subdural hematoma  likely hypertensive etiology though underlying lesion should be screened for.     NEURO: neurologically without acute change but remains with headache, continue close monitoring for neurologic deterioration as patient with cerebral edema, mass effect, and brain compression, repeat CTH as noted, maintain normotension as tolerated < 140mmHg, home statin regimen if applicable, MRI imaging should management be impacted by results, Nsx consult appreciated: . Physical therapy/OT: [facility].    ANTITHROMBOTIC THERAPY: none, ICH.    PULMONARY:  protecting airway, saturating well     CARDIOVASCULAR:  TTE: ef 55%, mitral annular calcification, minimal MR, minimal CT , cardiac monitoring without acute events noted                               SBP goal: <140mmhg     GASTROINTESTINAL:  dysphagia screen passed, tolerating diet        Diet: regular     RENAL: BUN/Cr without acute change, maintain adequate hydration, avoid hyponatremia , good urine output      Na Goal: Greater than 135     Roy:n     HEMATOLOGY: H/H without acute change, no active bleeding, Platelets 263, LE pain- r/o DVT, duplex pending      DVT ppx: Heparin s.c [] LMWH [x]     ID: afebrile, no leukocytosis , monitor for si/sx of infection         DISPOSITION: [facility]       CORE MEASURES:        Admission NIHSS:      TPA: [] YES [x] NO      LDL/HDL:82/59     Depression Screen: p     Statin Therapy: as noted     Dysphagia Screen: [x] PASS [] FAIL     Smoking [] YES [x] NO      Afib [] YES [x] NO     Stroke Education [x] YES [] NO    Obtain screening lower extremity venous ultrasound in patients who meet 1 or more of the following criteria as patient is high risk for DVT/PE on admission:   [] History of DVT/PE  [x]Hypercoagulable states (Factor V Leiden, Cancer, OCP, etc. )  []Prolonged immobility (hemiplegia/hemiparesis/post operative or any other extended immobilization)  [] Transferred from outside facility (Rehab or Long term care)  [] Age </= to 50

## 2020-08-25 NOTE — PROVIDER CONTACT NOTE (OTHER) - ACTION/TREATMENT ORDERED:
Provider ordered IV Tylenol and Zofran. There is no change in neurological status at this time. RN administered medications as ordered, see EMAR.
Give labetolol 10mg IVP. recheck BP at bedside in 15 minutes. George Pabon DO and Liane Henao DO present at bedside for Labetolol 10mg IVP

## 2020-08-25 NOTE — DISCHARGE NOTE PROVIDER - HOSPITAL COURSE
86 yo female with PMH HTN, breast cancer (in remission) presented as transfer from [place] with severe headache with right temporal-occipital hemorrhage with falcine SDH confirmed on CT, initially on cardene and admitted to [facility]. Patient now with improved mental status but continuing to have significant headache without other concerning symptoms. Patient is DNR/DNI so no surgical intervention is being pursued.         CT Head No Cont (08.25.20)    Heterogeneous area of high attenuation on the right posterior temporal/occipital/parietal region. Surrounding edema is identified. This compatible with an area of acute parenchymal hemorrhage. Thisfinding measures approximately 6.5 x 4.2 cm and previously measured approximately 7.2 x 3.5 cm on prior head CT performed on August 24, 2020.    Extra-axial high attenuation involving the right posterior temporal region is again seen, this is likely compatible with an area of acute subdural hematoma. This finding measures approximately 1.4 cm widest diameter and previously measured approximately 1.3 cm widest diameter.    Acute subdural hematoma along the interhemispheric region on the right sideis again seen. This finding measures approximately 0.2 cm widest diameter and previously measured approximately 0.5 cm widest diameter.    No significant shift or herniation is seen.    Localized mass effect consisting of sulcal effacement is seen.        CT Head No Cont (08.24.20)    Evidence of a right temporal parietal occipital acute hemorrhage as identified on the prior imaging study. On the current evaluation slight increase in the midline shift now measuring 4.5 mm previously measuring 3.5 mm. Questionable subtle slight increased mass effect on the right lateral ventricle. Minimal evidence of early uncal herniation. Intraventricular hemorrhage is seen in the occipital horn of the right lateral ventricle        (08.23.20)    1. CTA brain: There is no large vessel occlusion or aneurysm involving major proximal intracranial arteries.    2. CTA NECK: There is no stenosis involving major neck arteries.        Impression:  Right posterior temporal/occipital/parietal IPH, right posterior temporal and interhemispheric acute subdural hematoma  likely hypertensive etiology though underlying lesion should be screened for.         TTE: ef 55%, mitral annular calcification, minimal MR, minimal NC , cardiac monitoring without acute events noted.        LE doppler showed         PT/OT recommended [facility]. 88 yo female with PMH HTN, breast cancer (in remission) presented as transfer from [place] with severe headache with right temporal-occipital hemorrhage with falcine SDH confirmed on CT, initially on cardene and admitted to [facility]. Patient now with improved mental status but continuing to have significant headache without other concerning symptoms. Patient is DNR/DNI so no surgical intervention is being pursued.         CT Head No Cont (08.26.2020)    Stable acute parenchymal hemorrhage with associated subdural hematoma as described above.    Limited study due to patient motion. No evidence of sinus venous thrombosis.        CT Head No Cont (08.25.20)    Heterogeneous area of high attenuation on the right posterior temporal/occipital/parietal region. Surrounding edema is identified. This compatible with an area of acute parenchymal hemorrhage. Thisfinding measures approximately 6.5 x 4.2 cm and previously measured approximately 7.2 x 3.5 cm on prior head CT performed on August 24, 2020.    Extra-axial high attenuation involving the right posterior temporal region is again seen, this is likely compatible with an area of acute subdural hematoma. This finding measures approximately 1.4 cm widest diameter and previously measured approximately 1.3 cm widest diameter.    Acute subdural hematoma along the interhemispheric region on the right sideis again seen. This finding measures approximately 0.2 cm widest diameter and previously measured approximately 0.5 cm widest diameter.    No significant shift or herniation is seen.    Localized mass effect consisting of sulcal effacement is seen.        CT Head No Cont (08.24.20)    Evidence of a right temporal parietal occipital acute hemorrhage as identified on the prior imaging study. On the current evaluation slight increase in the midline shift now measuring 4.5 mm previously measuring 3.5 mm. Questionable subtle slight increased mass effect on the right lateral ventricle. Minimal evidence of early uncal herniation. Intraventricular hemorrhage is seen in the occipital horn of the right lateral ventricle        (08.23.20)    1. CTA brain: There is no large vessel occlusion or aneurysm involving major proximal intracranial arteries.    2. CTA NECK: There is no stenosis involving major neck arteries.        Impression:  Right posterior temporal/occipital/parietal IPH, right posterior temporal and interhemispheric acute subdural hematoma  likely hypertensive etiology though underlying lesion should be screened for.         TTE: ef 55%, mitral annular calcification, minimal MR, minimal TN , cardiac monitoring without acute events noted.        LE doppler negative for DVT.        PT/OT recommended [facility].

## 2020-08-26 LAB
ANION GAP SERPL CALC-SCNC: 15 MMOL/L — SIGNIFICANT CHANGE UP (ref 5–17)
BUN SERPL-MCNC: 20 MG/DL — SIGNIFICANT CHANGE UP (ref 7–23)
CALCIUM SERPL-MCNC: 9.1 MG/DL — SIGNIFICANT CHANGE UP (ref 8.4–10.5)
CHLORIDE SERPL-SCNC: 100 MMOL/L — SIGNIFICANT CHANGE UP (ref 96–108)
CO2 SERPL-SCNC: 22 MMOL/L — SIGNIFICANT CHANGE UP (ref 22–31)
CREAT SERPL-MCNC: 0.69 MG/DL — SIGNIFICANT CHANGE UP (ref 0.5–1.3)
GLUCOSE SERPL-MCNC: 112 MG/DL — HIGH (ref 70–99)
HCT VFR BLD CALC: 36.4 % — SIGNIFICANT CHANGE UP (ref 34.5–45)
HGB BLD-MCNC: 11.4 G/DL — LOW (ref 11.5–15.5)
MCHC RBC-ENTMCNC: 28.2 PG — SIGNIFICANT CHANGE UP (ref 27–34)
MCHC RBC-ENTMCNC: 31.3 GM/DL — LOW (ref 32–36)
MCV RBC AUTO: 90.1 FL — SIGNIFICANT CHANGE UP (ref 80–100)
NRBC # BLD: 0 /100 WBCS — SIGNIFICANT CHANGE UP (ref 0–0)
PLATELET # BLD AUTO: 257 K/UL — SIGNIFICANT CHANGE UP (ref 150–400)
POTASSIUM SERPL-MCNC: 3.6 MMOL/L — SIGNIFICANT CHANGE UP (ref 3.5–5.3)
POTASSIUM SERPL-SCNC: 3.6 MMOL/L — SIGNIFICANT CHANGE UP (ref 3.5–5.3)
RBC # BLD: 4.04 M/UL — SIGNIFICANT CHANGE UP (ref 3.8–5.2)
RBC # FLD: 14.7 % — HIGH (ref 10.3–14.5)
SODIUM SERPL-SCNC: 137 MMOL/L — SIGNIFICANT CHANGE UP (ref 135–145)
WBC # BLD: 9.43 K/UL — SIGNIFICANT CHANGE UP (ref 3.8–10.5)
WBC # FLD AUTO: 9.43 K/UL — SIGNIFICANT CHANGE UP (ref 3.8–10.5)

## 2020-08-26 PROCEDURE — 70496 CT ANGIOGRAPHY HEAD: CPT | Mod: 26

## 2020-08-26 RX ORDER — ONDANSETRON 8 MG/1
4 TABLET, FILM COATED ORAL ONCE
Refills: 0 | Status: COMPLETED | OUTPATIENT
Start: 2020-08-26 | End: 2020-08-26

## 2020-08-26 RX ORDER — HYDRALAZINE HCL 50 MG
5 TABLET ORAL ONCE
Refills: 0 | Status: COMPLETED | OUTPATIENT
Start: 2020-08-26 | End: 2020-08-26

## 2020-08-26 RX ORDER — LISINOPRIL 2.5 MG/1
40 TABLET ORAL DAILY
Refills: 0 | Status: DISCONTINUED | OUTPATIENT
Start: 2020-08-26 | End: 2020-08-27

## 2020-08-26 RX ORDER — LISINOPRIL 2.5 MG/1
20 TABLET ORAL ONCE
Refills: 0 | Status: COMPLETED | OUTPATIENT
Start: 2020-08-26 | End: 2020-08-26

## 2020-08-26 RX ORDER — ACETAMINOPHEN 500 MG
1000 TABLET ORAL ONCE
Refills: 0 | Status: COMPLETED | OUTPATIENT
Start: 2020-08-26 | End: 2020-08-26

## 2020-08-26 RX ORDER — VALPROIC ACID (AS SODIUM SALT) 250 MG/5ML
250 SOLUTION, ORAL ORAL ONCE
Refills: 0 | Status: COMPLETED | OUTPATIENT
Start: 2020-08-26 | End: 2020-08-26

## 2020-08-26 RX ORDER — LANOLIN ALCOHOL/MO/W.PET/CERES
5 CREAM (GRAM) TOPICAL AT BEDTIME
Refills: 0 | Status: DISCONTINUED | OUTPATIENT
Start: 2020-08-26 | End: 2020-08-27

## 2020-08-26 RX ORDER — AMLODIPINE BESYLATE 2.5 MG/1
10 TABLET ORAL DAILY
Refills: 0 | Status: DISCONTINUED | OUTPATIENT
Start: 2020-08-26 | End: 2020-08-27

## 2020-08-26 RX ADMIN — Medication 1000 MILLIGRAM(S): at 02:30

## 2020-08-26 RX ADMIN — TRAMADOL HYDROCHLORIDE 50 MILLIGRAM(S): 50 TABLET ORAL at 05:55

## 2020-08-26 RX ADMIN — TRAMADOL HYDROCHLORIDE 50 MILLIGRAM(S): 50 TABLET ORAL at 15:26

## 2020-08-26 RX ADMIN — ENOXAPARIN SODIUM 40 MILLIGRAM(S): 100 INJECTION SUBCUTANEOUS at 17:46

## 2020-08-26 RX ADMIN — TRAMADOL HYDROCHLORIDE 50 MILLIGRAM(S): 50 TABLET ORAL at 21:00

## 2020-08-26 RX ADMIN — TRAMADOL HYDROCHLORIDE 50 MILLIGRAM(S): 50 TABLET ORAL at 10:36

## 2020-08-26 RX ADMIN — Medication 250 MILLIGRAM(S): at 17:45

## 2020-08-26 RX ADMIN — Medication 400 MILLIGRAM(S): at 11:01

## 2020-08-26 RX ADMIN — Medication 5 MILLIGRAM(S): at 23:22

## 2020-08-26 RX ADMIN — SENNA PLUS 2 TABLET(S): 8.6 TABLET ORAL at 23:22

## 2020-08-26 RX ADMIN — ONDANSETRON 4 MILLIGRAM(S): 8 TABLET, FILM COATED ORAL at 11:02

## 2020-08-26 RX ADMIN — AMLODIPINE BESYLATE 10 MILLIGRAM(S): 2.5 TABLET ORAL at 10:35

## 2020-08-26 RX ADMIN — Medication 650 MILLIGRAM(S): at 23:49

## 2020-08-26 RX ADMIN — LISINOPRIL 20 MILLIGRAM(S): 2.5 TABLET ORAL at 17:46

## 2020-08-26 RX ADMIN — TRAMADOL HYDROCHLORIDE 50 MILLIGRAM(S): 50 TABLET ORAL at 20:30

## 2020-08-26 RX ADMIN — Medication 10 MILLIGRAM(S): at 07:37

## 2020-08-26 RX ADMIN — TRAMADOL HYDROCHLORIDE 50 MILLIGRAM(S): 50 TABLET ORAL at 06:30

## 2020-08-26 RX ADMIN — Medication 400 MILLIGRAM(S): at 02:01

## 2020-08-26 RX ADMIN — LISINOPRIL 20 MILLIGRAM(S): 2.5 TABLET ORAL at 05:55

## 2020-08-26 RX ADMIN — Medication 5 MILLIGRAM(S): at 02:09

## 2020-08-26 NOTE — PROGRESS NOTE ADULT - SUBJECTIVE AND OBJECTIVE BOX
THE PATIENT WAS SEEN AND EXAMINED BY ME WITH THE HOUSESTAFF AND STROKE TEAM DURING MORNING ROUNDS.   HPI:  87F with hx of HTN presented as transfer from [place] with ICH on cardene w/possible early uncal herniation on CT. Initially managed in ICU now transfer to stroke unit for further care.      SUBJECTIVE: No events overnight.  No new neurologic complaints.  ROS reported negative unless otherwise noted.    acetaminophen   Tablet .. 650 milliGRAM(s) Oral every 6 hours PRN  chlorhexidine 4% Liquid 1 Application(s) Topical <User Schedule>  enoxaparin Injectable 40 milliGRAM(s) SubCutaneous <User Schedule>  hydrALAZINE Injectable 10 milliGRAM(s) IV Push every 3 hours PRN  lisinopril 20 milliGRAM(s) Oral two times a day  polyethylene glycol 3350 17 Gram(s) Oral two times a day  senna 2 Tablet(s) Oral at bedtime  traMADol 25 milliGRAM(s) Oral every 4 hours PRN  traMADol 50 milliGRAM(s) Oral every 4 hours PRN      PHYSICAL EXAM:   Vital Signs Last 24 Hrs  T(C): 36.6 (26 Aug 2020 05:00), Max: 36.7 (25 Aug 2020 08:17)  T(F): 97.9 (26 Aug 2020 05:00), Max: 98.1 (25 Aug 2020 08:17)  HR: 73 (26 Aug 2020 05:00) (48 - 79)  BP: 187/69 (26 Aug 2020 05:00) (150/60 - 192/71)  BP(mean): --  RR: 18 (26 Aug 2020 05:00) (16 - 18)  SpO2: 94% (26 Aug 2020 05:00) (94% - 97%)    General: uncomfortable , exam limited due to discomfort   HEENT: EOM appear intact   Abdomen: Soft, nontender, nondistended   Extremities: No edema    NEUROLOGICAL EXAM:  Mental status: Awake, alert,  oriented to person,  place, time. able to follow simple commands   Cranial Nerves: No facial asymmetry appreciated, no dysarthria,  tongue midline  Motor exam:  moves extremities against gravity within bed   Sensation: Intact to light touch   Coordination/ Gait: gait not assessed     LABS:                        11.4   9.43  )-----------( 257      ( 26 Aug 2020 05:07 )             36.4    08-26    137  |  100  |  20  ----------------------------<  112<H>  3.6   |  22  |  0.69    Ca    9.1      26 Aug 2020 05:07          IMAGING: Reviewed by me.     CT Head No Cont (08.25.20)  Heterogeneous area of high attenuation on the right posterior temporal/occipital/parietal region. Surrounding edema is identified. This compatible with an area of acute parenchymal hemorrhage. Thisfinding measures approximately 6.5 x 4.2 cm and previously measured approximately 7.2 x 3.5 cm on prior head CT performed on August 24, 2020.  Extra-axial high attenuation involving the right posterior temporal region is again seen, this is likely compatible with an area of acute subdural hematoma. This finding measures approximately 1.4 cm widest diameter and previously measured approximately 1.3 cm widest diameter.  Acute subdural hematoma along the interhemispheric region on the right sideis again seen. This finding measures approximately 0.2 cm widest diameter and previously measured approximately 0.5 cm widest diameter.  No significant shift or herniation is seen.  Localized mass effect consisting of sulcal effacement is seen.    CT Head No Cont (08.24.20)  Evidence of a right temporal parietal occipital acute hemorrhage as identified on the prior imaging study. On the current evaluation slight increase in the midline shift now measuring 4.5 mm previously measuring 3.5 mm. Questionable subtle slight increased mass effect on the right lateral ventricle. Minimal evidence of early uncal herniation. Intraventricular hemorrhage is seen in the occipital horn of the right lateral ventricle    (08.23.20)  1. CTA brain: There is no large vessel occlusion or aneurysm involving major proximal intracranial arteries.  2. CTA NECK: There is no stenosis involving major neck arteries. THE PATIENT WAS SEEN AND EXAMINED BY ME WITH THE HOUSESTAFF AND STROKE TEAM DURING MORNING ROUNDS.   HPI:  87F with hx of HTN presented as transfer from [place] with ICH on cardene w/possible early uncal herniation on CT. Initially managed in ICU now transfer to stroke unit for further care.      SUBJECTIVE: Hypertension and headache continues at times.  No new neurologic complaints.  ROS reported negative unless otherwise noted.    acetaminophen   Tablet .. 650 milliGRAM(s) Oral every 6 hours PRN  chlorhexidine 4% Liquid 1 Application(s) Topical <User Schedule>  enoxaparin Injectable 40 milliGRAM(s) SubCutaneous <User Schedule>  hydrALAZINE Injectable 10 milliGRAM(s) IV Push every 3 hours PRN  lisinopril 20 milliGRAM(s) Oral two times a day  polyethylene glycol 3350 17 Gram(s) Oral two times a day  senna 2 Tablet(s) Oral at bedtime  traMADol 25 milliGRAM(s) Oral every 4 hours PRN  traMADol 50 milliGRAM(s) Oral every 4 hours PRN      PHYSICAL EXAM:   Vital Signs Last 24 Hrs  T(C): 36.6 (26 Aug 2020 05:00), Max: 36.7 (25 Aug 2020 08:17)  T(F): 97.9 (26 Aug 2020 05:00), Max: 98.1 (25 Aug 2020 08:17)  HR: 73 (26 Aug 2020 05:00) (48 - 79)  BP: 187/69 (26 Aug 2020 05:00) (150/60 - 192/71)  BP(mean): --  RR: 18 (26 Aug 2020 05:00) (16 - 18)  SpO2: 94% (26 Aug 2020 05:00) (94% - 97%)    General: uncomfortable , exam limited due to discomfort   HEENT: EOM appear intact   Abdomen: Soft, nontender, nondistended   Extremities: No edema    NEUROLOGICAL EXAM:  Mental status: Awake, alert,  oriented to person,  place, time. able to follow simple commands   Cranial Nerves: No facial asymmetry appreciated, no dysarthria,  tongue midline  Motor exam:  moves extremities against gravity within bed   Sensation: Intact to light touch   Coordination/ Gait: gait not assessed     LABS:                        11.4   9.43  )-----------( 257      ( 26 Aug 2020 05:07 )             36.4    08-26    137  |  100  |  20  ----------------------------<  112<H>  3.6   |  22  |  0.69    Ca    9.1      26 Aug 2020 05:07          IMAGING: Reviewed by me.     CT Head No Cont (08.25.20)  Heterogeneous area of high attenuation on the right posterior temporal/occipital/parietal region. Surrounding edema is identified. This compatible with an area of acute parenchymal hemorrhage. Thisfinding measures approximately 6.5 x 4.2 cm and previously measured approximately 7.2 x 3.5 cm on prior head CT performed on August 24, 2020.  Extra-axial high attenuation involving the right posterior temporal region is again seen, this is likely compatible with an area of acute subdural hematoma. This finding measures approximately 1.4 cm widest diameter and previously measured approximately 1.3 cm widest diameter.  Acute subdural hematoma along the interhemispheric region on the right sideis again seen. This finding measures approximately 0.2 cm widest diameter and previously measured approximately 0.5 cm widest diameter.  No significant shift or herniation is seen.  Localized mass effect consisting of sulcal effacement is seen.    CT Head No Cont (08.24.20)  Evidence of a right temporal parietal occipital acute hemorrhage as identified on the prior imaging study. On the current evaluation slight increase in the midline shift now measuring 4.5 mm previously measuring 3.5 mm. Questionable subtle slight increased mass effect on the right lateral ventricle. Minimal evidence of early uncal herniation. Intraventricular hemorrhage is seen in the occipital horn of the right lateral ventricle    (08.23.20)  1. CTA brain: There is no large vessel occlusion or aneurysm involving major proximal intracranial arteries.  2. CTA NECK: There is no stenosis involving major neck arteries. THE PATIENT WAS SEEN AND EXAMINED BY ME WITH THE HOUSESTAFF AND STROKE TEAM DURING MORNING ROUNDS.   HPI:  87F with hx of HTN presented as transfer from [place] with ICH on cardene w/possible early uncal herniation on CT. Initially managed in ICU now transfer to stroke unit for further care.      SUBJECTIVE: Hypertension, nausea,  and headache continues at times but the same as it has been.  No new neurologic complaints.  ROS reported negative unless otherwise noted.  [id number]    acetaminophen   Tablet .. 650 milliGRAM(s) Oral every 6 hours PRN  chlorhexidine 4% Liquid 1 Application(s) Topical <User Schedule>  enoxaparin Injectable 40 milliGRAM(s) SubCutaneous <User Schedule>  hydrALAZINE Injectable 10 milliGRAM(s) IV Push every 3 hours PRN  lisinopril 20 milliGRAM(s) Oral two times a day  polyethylene glycol 3350 17 Gram(s) Oral two times a day  senna 2 Tablet(s) Oral at bedtime  traMADol 25 milliGRAM(s) Oral every 4 hours PRN  traMADol 50 milliGRAM(s) Oral every 4 hours PRN      PHYSICAL EXAM:   Vital Signs Last 24 Hrs  T(C): 36.6 (26 Aug 2020 05:00), Max: 36.7 (25 Aug 2020 08:17)  T(F): 97.9 (26 Aug 2020 05:00), Max: 98.1 (25 Aug 2020 08:17)  HR: 73 (26 Aug 2020 05:00) (48 - 79)  BP: 187/69 (26 Aug 2020 05:00) (150/60 - 192/71)  BP(mean): --  RR: 18 (26 Aug 2020 05:00) (16 - 18)  SpO2: 94% (26 Aug 2020 05:00) (94% - 97%)    General: uncomfortable , exam limited due to discomfort   HEENT: EOM appear intact   Abdomen: Soft, nontender, nondistended   Extremities: No edema    NEUROLOGICAL EXAM:  Mental status: Awake, alert,  oriented to person,  place, time. able to follow simple commands   Cranial Nerves: No facial asymmetry appreciated, no dysarthria,  tongue midline  Motor exam:  moves extremities against gravity within bed   Sensation: Intact to light touch   Coordination/ Gait: gait not assessed     LABS:                        11.4   9.43  )-----------( 257      ( 26 Aug 2020 05:07 )             36.4    08-26    137  |  100  |  20  ----------------------------<  112<H>  3.6   |  22  |  0.69    Ca    9.1      26 Aug 2020 05:07          IMAGING: Reviewed by me.     CT Head No Cont (08.25.20)  Heterogeneous area of high attenuation on the right posterior temporal/occipital/parietal region. Surrounding edema is identified. This compatible with an area of acute parenchymal hemorrhage. Thisfinding measures approximately 6.5 x 4.2 cm and previously measured approximately 7.2 x 3.5 cm on prior head CT performed on August 24, 2020.  Extra-axial high attenuation involving the right posterior temporal region is again seen, this is likely compatible with an area of acute subdural hematoma. This finding measures approximately 1.4 cm widest diameter and previously measured approximately 1.3 cm widest diameter.  Acute subdural hematoma along the interhemispheric region on the right sideis again seen. This finding measures approximately 0.2 cm widest diameter and previously measured approximately 0.5 cm widest diameter.  No significant shift or herniation is seen.  Localized mass effect consisting of sulcal effacement is seen.    CT Head No Cont (08.24.20)  Evidence of a right temporal parietal occipital acute hemorrhage as identified on the prior imaging study. On the current evaluation slight increase in the midline shift now measuring 4.5 mm previously measuring 3.5 mm. Questionable subtle slight increased mass effect on the right lateral ventricle. Minimal evidence of early uncal herniation. Intraventricular hemorrhage is seen in the occipital horn of the right lateral ventricle    (08.23.20)  1. CTA brain: There is no large vessel occlusion or aneurysm involving major proximal intracranial arteries.  2. CTA NECK: There is no stenosis involving major neck arteries.

## 2020-08-26 NOTE — PROGRESS NOTE ADULT - ASSESSMENT
86 yo female with PMH HTN, breast cancer (in remission) presenting as transfer from [place] with severe headache with right temporal-occipital hemorrhage with falcine SDH confirmed on CT, initially on cardene and admitted to NSCU. Patient now with improved mental status but continuing to have significant headache without other concerning symptoms. Patient is DNR/DNI so no surgical intervention is being pursued. Repeat head CT done early this morning, pending results.

## 2020-08-26 NOTE — PROGRESS NOTE ADULT - ASSESSMENT
ASSESSMENT: 88 yo female with PMH HTN, breast cancer (in remission) presented as transfer from [place] with severe headache with right temporal-occipital hemorrhage with falcine SDH confirmed on CT, initially on cardene and admitted to NSCU. Patient now with improved mental status but continuing to have significant headache without other concerning symptoms. Patient is DNR/DNI so no surgical intervention is being pursued. Repeat head CT done early this morning, pending results.    Impression:  Right posterior temporal/occipital/parietal IPH, right posterior temporal and interhemispheric acute subdural hematoma  likely hypertensive etiology though underlying lesion should be screened for.     NEURO: neurologically without acute change but remains with headache- continue sx control, continue close monitoring for neurologic deterioration as patient with cerebral edema, mass effect, and brain compression, repeat CTH as noted, maintain normotension as tolerated < 140mmHg, home statin regimen if applicable, MRI imaging should management be impacted by results, Nsx consult appreciated: . Physical therapy/OT: [facility].    ANTITHROMBOTIC THERAPY: none, ICH.    PULMONARY:  protecting airway, saturating well     CARDIOVASCULAR:  TTE: ef 55%, mitral annular calcification, minimal MR, minimal WA , cardiac monitoring without acute events noted, titration of antihypertensive regimen for further bp control.                              SBP goal: <140mmhg     GASTROINTESTINAL:  dysphagia screen passed, tolerating diet        Diet: regular     RENAL: BUN/Cr without acute change, maintain adequate hydration, avoid hyponatremia , good urine output      Na Goal: Greater than 135     Roy:n     HEMATOLOGY: H/H without acute change, no active bleeding, Platelets 257, LE pain- LE duplex negative     DVT ppx: Heparin s.c [] LMWH [x]     ID: afebrile, no leukocytosis , monitor for si/sx of infection       DISPOSITION: [facility]       CORE MEASURES:        Admission NIHSS:      TPA: [] YES [x] NO      LDL/HDL:82/59     Depression Screen: NA- unable to participate -uncomfortable from headache     Statin Therapy: as noted     Dysphagia Screen: [x] PASS [] FAIL     Smoking [] YES [x] NO      Afib [] YES [x] NO     Stroke Education [x] YES [] NO    Obtain screening lower extremity venous ultrasound in patients who meet 1 or more of the following criteria as patient is high risk for DVT/PE on admission:   [] History of DVT/PE  [x]Hypercoagulable states (Factor V Leiden, Cancer, OCP, etc. )  []Prolonged immobility (hemiplegia/hemiparesis/post operative or any other extended immobilization)  [] Transferred from outside facility (Rehab or Long term care)  [] Age </= to 50 ASSESSMENT: 88 yo female with PMH HTN, breast cancer (in remission) presented as transfer from [place] with severe headache with right temporal-occipital hemorrhage with falcine SDH confirmed on CT, initially on cardene and admitted to NSCU. Patient now with improved mental status but continuing to have significant headache without other concerning symptoms. Patient is DNR/DNI so no surgical intervention is being pursued. Repeat head CT done early this morning, pending results.    Impression:  Right posterior temporal/occipital/parietal IPH, right posterior temporal and interhemispheric acute subdural hematoma  presumed cerebral amyloid angiopathy though underlying lesion should be screened for.     NEURO: neurologically without acute change but remains with headache- continue sx control, continue close monitoring for neurologic deterioration as patient with cerebral edema, mass effect, and brain compression, repeat CTH as noted, maintain normotension as tolerated < 140mmHg, home statin regimen if applicable, MRI imaging should management be impacted by results, Nsx consult appreciated: . Physical therapy/OT: [facility].    ANTITHROMBOTIC THERAPY: none, ICH.    PULMONARY:  protecting airway, saturating well     CARDIOVASCULAR:  TTE: ef 55%, mitral annular calcification, minimal MR, minimal NH , cardiac monitoring without acute events noted, titration of antihypertensive regimen for further bp control.                              SBP goal: <140mmhg     GASTROINTESTINAL:  dysphagia screen passed, tolerating diet        Diet: regular     RENAL: BUN/Cr without acute change, maintain adequate hydration, avoid hyponatremia , good urine output      Na Goal: Greater than 135     Roy:n     HEMATOLOGY: H/H without acute change, no active bleeding, Platelets 257, LE pain- LE duplex negative     DVT ppx: Heparin s.c [] LMWH [x]     ID: afebrile, no leukocytosis , monitor for si/sx of infection       DISPOSITION: [facility]       CORE MEASURES:        Admission NIHSS:      TPA: [] YES [x] NO      LDL/HDL:82/59     Depression Screen: NA- unable to participate -uncomfortable from headache     Statin Therapy: as noted     Dysphagia Screen: [x] PASS [] FAIL     Smoking [] YES [x] NO      Afib [] YES [x] NO     Stroke Education [x] YES [] NO    Obtain screening lower extremity venous ultrasound in patients who meet 1 or more of the following criteria as patient is high risk for DVT/PE on admission:   [] History of DVT/PE  [x]Hypercoagulable states (Factor V Leiden, Cancer, OCP, etc. )  []Prolonged immobility (hemiplegia/hemiparesis/post operative or any other extended immobilization)  [] Transferred from outside facility (Rehab or Long term care)  [] Age </= to 50

## 2020-08-26 NOTE — PROGRESS NOTE ADULT - SUBJECTIVE AND OBJECTIVE BOX
Subjective: Patient seen and examined. No new events except as noted.   was called overnight for elevated BP   advised to change lisinopril to 40 mg daily and add Norvasc 5     REVIEW OF SYSTEMS:    CONSTITUTIONAL: + weakness, fevers or chills  EYES/ENT: No visual changes;  No vertigo or throat pain   NECK: No pain or stiffness  RESPIRATORY: No cough, wheezing, hemoptysis; No shortness of breath  CARDIOVASCULAR: No chest pain or palpitations  GASTROINTESTINAL: No abdominal or epigastric pain. No nausea, vomiting, or hematemesis; No diarrhea or constipation. No melena or hematochezia.  GENITOURINARY: No dysuria, frequency or hematuria  NEUROLOGICAL: No numbness or weakness  SKIN: No itching, burning, rashes, or lesions   All other review of systems is negative unless indicated above.    MEDICATIONS:  MEDICATIONS  (STANDING):  acetaminophen  IVPB .. 1000 milliGRAM(s) IV Intermittent once  amLODIPine   Tablet 10 milliGRAM(s) Oral daily  chlorhexidine 4% Liquid 1 Application(s) Topical <User Schedule>  enoxaparin Injectable 40 milliGRAM(s) SubCutaneous <User Schedule>  lisinopril 40 milliGRAM(s) Oral daily  lisinopril 20 milliGRAM(s) Oral once  ondansetron Injectable 4 milliGRAM(s) IV Push once  polyethylene glycol 3350 17 Gram(s) Oral two times a day  senna 2 Tablet(s) Oral at bedtime      PHYSICAL EXAM:  T(C): 36.6 (08-26-20 @ 05:00), Max: 36.7 (08-25-20 @ 19:37)  HR: 85 (08-26-20 @ 08:00) (48 - 85)  BP: 166/74 (08-26-20 @ 08:00) (150/60 - 192/71)  RR: 18 (08-26-20 @ 05:00) (16 - 18)  SpO2: 94% (08-26-20 @ 05:00) (94% - 97%)  Wt(kg): --  I&O's Summary    25 Aug 2020 07:01  -  26 Aug 2020 07:00  --------------------------------------------------------  IN: 460 mL / OUT: 0 mL / NET: 460 mL          Appearance: NAD lethargic   HEENT: Dry oral mucosa, PERRL, EOMI	  Lymphatic: No lymphadenopathy  Cardiovascular: Normal S1 S2, No JVD, No murmurs, No edema  Respiratory: Lungs clear to auscultation	  Psychiatry: A & O x 3,sleepy and lethargic appropriate  Gastrointestinal:  Soft, Non-tender, + BS	  Skin: No rashes, No ecchymoses, No cyanosis	  Extremities: Normal range of motion, No clubbing, cyanosis or edema  Vascular: Peripheral pulses palpable 2+ bilaterally  Neurologic:  - Mental Status:  arousable to voice, and requires continued stimulation; oriented to person, place, and time; Speech is fluent when answering questions, able to repeat and follow simple commands. Headache limits cooperation with additional mental status testing; no extinction or neglect noted;   - Cranial Nerves II-XII:  VFF, No nystagmus noted, EOMI, PERRLA, V1-V3 intact, no facial asymmetry, t/p midline, SCM/trap intact.  - Motor:  Normal muscle bulk and tone throughout. No pronator drift noted. No myoclonus or tremor.  - Reflexes: 2+ biceps, brachioradialis, patellar, ankle reflexes. Plantar resp    LABS:    CARDIAC MARKERS:                                11.4   9.43  )-----------( 257      ( 26 Aug 2020 05:07 )             36.4     08-26    137  |  100  |  20  ----------------------------<  112<H>  3.6   |  22  |  0.69    Ca    9.1      26 Aug 2020 05:07      proBNP:   Lipid Profile:   HgA1c:   TSH:             TELEMETRY: 	    ECG:  	  RADIOLOGY:   DIAGNOSTIC TESTING:  [ ] Echocardiogram:  [ ]  Catheterization:  [ ] Stress Test:    OTHER:

## 2020-08-27 ENCOUNTER — TRANSCRIPTION ENCOUNTER (OUTPATIENT)
Age: 85
End: 2020-08-27

## 2020-08-27 VITALS
HEART RATE: 102 BPM | TEMPERATURE: 97 F | SYSTOLIC BLOOD PRESSURE: 160 MMHG | RESPIRATION RATE: 19 BRPM | OXYGEN SATURATION: 96 % | DIASTOLIC BLOOD PRESSURE: 84 MMHG

## 2020-08-27 LAB
ANION GAP SERPL CALC-SCNC: 13 MMOL/L — SIGNIFICANT CHANGE UP (ref 5–17)
ANION GAP SERPL CALC-SCNC: 14 MMOL/L — SIGNIFICANT CHANGE UP (ref 5–17)
BUN SERPL-MCNC: 18 MG/DL — SIGNIFICANT CHANGE UP (ref 7–23)
BUN SERPL-MCNC: 22 MG/DL — SIGNIFICANT CHANGE UP (ref 7–23)
CALCIUM SERPL-MCNC: 9.2 MG/DL — SIGNIFICANT CHANGE UP (ref 8.4–10.5)
CALCIUM SERPL-MCNC: 9.7 MG/DL — SIGNIFICANT CHANGE UP (ref 8.4–10.5)
CHLORIDE SERPL-SCNC: 95 MMOL/L — LOW (ref 96–108)
CHLORIDE SERPL-SCNC: 95 MMOL/L — LOW (ref 96–108)
CO2 SERPL-SCNC: 24 MMOL/L — SIGNIFICANT CHANGE UP (ref 22–31)
CO2 SERPL-SCNC: 25 MMOL/L — SIGNIFICANT CHANGE UP (ref 22–31)
CREAT SERPL-MCNC: 0.72 MG/DL — SIGNIFICANT CHANGE UP (ref 0.5–1.3)
CREAT SERPL-MCNC: 0.78 MG/DL — SIGNIFICANT CHANGE UP (ref 0.5–1.3)
GLUCOSE SERPL-MCNC: 111 MG/DL — HIGH (ref 70–99)
GLUCOSE SERPL-MCNC: 116 MG/DL — HIGH (ref 70–99)
HCT VFR BLD CALC: 34.9 % — SIGNIFICANT CHANGE UP (ref 34.5–45)
HGB BLD-MCNC: 10.9 G/DL — LOW (ref 11.5–15.5)
MCHC RBC-ENTMCNC: 27.9 PG — SIGNIFICANT CHANGE UP (ref 27–34)
MCHC RBC-ENTMCNC: 31.2 GM/DL — LOW (ref 32–36)
MCV RBC AUTO: 89.3 FL — SIGNIFICANT CHANGE UP (ref 80–100)
NRBC # BLD: 0 /100 WBCS — SIGNIFICANT CHANGE UP (ref 0–0)
PLATELET # BLD AUTO: 284 K/UL — SIGNIFICANT CHANGE UP (ref 150–400)
POTASSIUM SERPL-MCNC: 3.7 MMOL/L — SIGNIFICANT CHANGE UP (ref 3.5–5.3)
POTASSIUM SERPL-MCNC: 3.7 MMOL/L — SIGNIFICANT CHANGE UP (ref 3.5–5.3)
POTASSIUM SERPL-SCNC: 3.7 MMOL/L — SIGNIFICANT CHANGE UP (ref 3.5–5.3)
POTASSIUM SERPL-SCNC: 3.7 MMOL/L — SIGNIFICANT CHANGE UP (ref 3.5–5.3)
RBC # BLD: 3.91 M/UL — SIGNIFICANT CHANGE UP (ref 3.8–5.2)
RBC # FLD: 14.7 % — HIGH (ref 10.3–14.5)
SODIUM SERPL-SCNC: 133 MMOL/L — LOW (ref 135–145)
SODIUM SERPL-SCNC: 133 MMOL/L — LOW (ref 135–145)
WBC # BLD: 8.17 K/UL — SIGNIFICANT CHANGE UP (ref 3.8–10.5)
WBC # FLD AUTO: 8.17 K/UL — SIGNIFICANT CHANGE UP (ref 3.8–10.5)

## 2020-08-27 PROCEDURE — 97162 PT EVAL MOD COMPLEX 30 MIN: CPT

## 2020-08-27 PROCEDURE — 80053 COMPREHEN METABOLIC PANEL: CPT

## 2020-08-27 PROCEDURE — 70496 CT ANGIOGRAPHY HEAD: CPT

## 2020-08-27 PROCEDURE — 92507 TX SP LANG VOICE COMM INDIV: CPT

## 2020-08-27 PROCEDURE — 86900 BLOOD TYPING SEROLOGIC ABO: CPT

## 2020-08-27 PROCEDURE — 83735 ASSAY OF MAGNESIUM: CPT

## 2020-08-27 PROCEDURE — 97166 OT EVAL MOD COMPLEX 45 MIN: CPT

## 2020-08-27 PROCEDURE — 84484 ASSAY OF TROPONIN QUANT: CPT

## 2020-08-27 PROCEDURE — 84443 ASSAY THYROID STIM HORMONE: CPT

## 2020-08-27 PROCEDURE — 84100 ASSAY OF PHOSPHORUS: CPT

## 2020-08-27 PROCEDURE — 85027 COMPLETE CBC AUTOMATED: CPT

## 2020-08-27 PROCEDURE — 93005 ELECTROCARDIOGRAM TRACING: CPT

## 2020-08-27 PROCEDURE — 93306 TTE W/DOPPLER COMPLETE: CPT

## 2020-08-27 PROCEDURE — 85730 THROMBOPLASTIN TIME PARTIAL: CPT

## 2020-08-27 PROCEDURE — 86901 BLOOD TYPING SEROLOGIC RH(D): CPT

## 2020-08-27 PROCEDURE — 92523 SPEECH SOUND LANG COMPREHEN: CPT

## 2020-08-27 PROCEDURE — 84436 ASSAY OF TOTAL THYROXINE: CPT

## 2020-08-27 PROCEDURE — 83036 HEMOGLOBIN GLYCOSYLATED A1C: CPT

## 2020-08-27 PROCEDURE — 70450 CT HEAD/BRAIN W/O DYE: CPT

## 2020-08-27 PROCEDURE — 96374 THER/PROPH/DIAG INJ IV PUSH: CPT

## 2020-08-27 PROCEDURE — 85610 PROTHROMBIN TIME: CPT

## 2020-08-27 PROCEDURE — 97110 THERAPEUTIC EXERCISES: CPT

## 2020-08-27 PROCEDURE — 85576 BLOOD PLATELET AGGREGATION: CPT

## 2020-08-27 PROCEDURE — 93970 EXTREMITY STUDY: CPT

## 2020-08-27 PROCEDURE — 80048 BASIC METABOLIC PNL TOTAL CA: CPT

## 2020-08-27 PROCEDURE — 84480 ASSAY TRIIODOTHYRONINE (T3): CPT

## 2020-08-27 PROCEDURE — 85396 CLOTTING ASSAY WHOLE BLOOD: CPT

## 2020-08-27 PROCEDURE — 86850 RBC ANTIBODY SCREEN: CPT

## 2020-08-27 PROCEDURE — 97116 GAIT TRAINING THERAPY: CPT

## 2020-08-27 PROCEDURE — 97535 SELF CARE MNGMENT TRAINING: CPT

## 2020-08-27 PROCEDURE — 80061 LIPID PANEL: CPT

## 2020-08-27 PROCEDURE — 99285 EMERGENCY DEPT VISIT HI MDM: CPT | Mod: 25

## 2020-08-27 RX ORDER — SODIUM CHLORIDE 9 MG/ML
1 INJECTION INTRAMUSCULAR; INTRAVENOUS; SUBCUTANEOUS DAILY
Refills: 0 | Status: DISCONTINUED | OUTPATIENT
Start: 2020-08-27 | End: 2020-08-27

## 2020-08-27 RX ORDER — POLYETHYLENE GLYCOL 3350 17 G/17G
17 POWDER, FOR SOLUTION ORAL
Qty: 0 | Refills: 0 | DISCHARGE
Start: 2020-08-27

## 2020-08-27 RX ORDER — ENOXAPARIN SODIUM 100 MG/ML
40 INJECTION SUBCUTANEOUS
Qty: 0 | Refills: 0 | DISCHARGE
Start: 2020-08-27

## 2020-08-27 RX ORDER — LANOLIN ALCOHOL/MO/W.PET/CERES
1 CREAM (GRAM) TOPICAL
Qty: 0 | Refills: 0 | DISCHARGE
Start: 2020-08-27

## 2020-08-27 RX ORDER — TRAMADOL HYDROCHLORIDE 50 MG/1
0.5 TABLET ORAL
Qty: 0 | Refills: 0 | DISCHARGE
Start: 2020-08-27

## 2020-08-27 RX ORDER — MORPHINE SULFATE 50 MG/1
15 CAPSULE, EXTENDED RELEASE ORAL ONCE
Refills: 0 | Status: DISCONTINUED | OUTPATIENT
Start: 2020-08-27 | End: 2020-08-27

## 2020-08-27 RX ORDER — ACETAMINOPHEN 500 MG
2 TABLET ORAL
Qty: 0 | Refills: 0 | DISCHARGE
Start: 2020-08-27

## 2020-08-27 RX ORDER — TRAMADOL HYDROCHLORIDE 50 MG/1
1 TABLET ORAL
Qty: 0 | Refills: 0 | DISCHARGE
Start: 2020-08-27

## 2020-08-27 RX ORDER — SENNA PLUS 8.6 MG/1
2 TABLET ORAL
Qty: 0 | Refills: 0 | DISCHARGE
Start: 2020-08-27

## 2020-08-27 RX ORDER — DOXAZOSIN MESYLATE 4 MG
4 TABLET ORAL DAILY
Refills: 0 | Status: DISCONTINUED | OUTPATIENT
Start: 2020-08-27 | End: 2020-08-27

## 2020-08-27 RX ORDER — LISINOPRIL 2.5 MG/1
1 TABLET ORAL
Qty: 0 | Refills: 0 | DISCHARGE
Start: 2020-08-27

## 2020-08-27 RX ORDER — DOXAZOSIN MESYLATE 4 MG
1 TABLET ORAL
Qty: 0 | Refills: 0 | DISCHARGE
Start: 2020-08-27

## 2020-08-27 RX ORDER — SODIUM CHLORIDE 9 MG/ML
1 INJECTION INTRAMUSCULAR; INTRAVENOUS; SUBCUTANEOUS
Qty: 0 | Refills: 0 | DISCHARGE
Start: 2020-08-27

## 2020-08-27 RX ORDER — AMLODIPINE BESYLATE 2.5 MG/1
1 TABLET ORAL
Qty: 0 | Refills: 0 | DISCHARGE
Start: 2020-08-27

## 2020-08-27 RX ADMIN — TRAMADOL HYDROCHLORIDE 25 MILLIGRAM(S): 50 TABLET ORAL at 17:31

## 2020-08-27 RX ADMIN — AMLODIPINE BESYLATE 10 MILLIGRAM(S): 2.5 TABLET ORAL at 05:32

## 2020-08-27 RX ADMIN — SODIUM CHLORIDE 1 GRAM(S): 9 INJECTION INTRAMUSCULAR; INTRAVENOUS; SUBCUTANEOUS at 14:53

## 2020-08-27 RX ADMIN — Medication 10 MILLIGRAM(S): at 08:56

## 2020-08-27 RX ADMIN — TRAMADOL HYDROCHLORIDE 50 MILLIGRAM(S): 50 TABLET ORAL at 02:40

## 2020-08-27 RX ADMIN — Medication 4 MILLIGRAM(S): at 14:53

## 2020-08-27 RX ADMIN — MORPHINE SULFATE 15 MILLIGRAM(S): 50 CAPSULE, EXTENDED RELEASE ORAL at 00:30

## 2020-08-27 RX ADMIN — TRAMADOL HYDROCHLORIDE 50 MILLIGRAM(S): 50 TABLET ORAL at 10:00

## 2020-08-27 RX ADMIN — Medication 650 MILLIGRAM(S): at 00:19

## 2020-08-27 RX ADMIN — Medication 10 MILLIGRAM(S): at 00:13

## 2020-08-27 RX ADMIN — MORPHINE SULFATE 15 MILLIGRAM(S): 50 CAPSULE, EXTENDED RELEASE ORAL at 01:00

## 2020-08-27 RX ADMIN — TRAMADOL HYDROCHLORIDE 50 MILLIGRAM(S): 50 TABLET ORAL at 03:10

## 2020-08-27 RX ADMIN — LISINOPRIL 40 MILLIGRAM(S): 2.5 TABLET ORAL at 05:33

## 2020-08-27 RX ADMIN — POLYETHYLENE GLYCOL 3350 17 GRAM(S): 17 POWDER, FOR SOLUTION ORAL at 05:38

## 2020-08-27 RX ADMIN — TRAMADOL HYDROCHLORIDE 50 MILLIGRAM(S): 50 TABLET ORAL at 08:56

## 2020-08-27 NOTE — PROGRESS NOTE ADULT - ASSESSMENT
88 yo female with PMH HTN, breast cancer (in remission) presenting as transfer from [place] with severe headache with right temporal-occipital hemorrhage with falcine SDH confirmed on CT, initially on cardene and admitted to NSCU. Patient now with improved mental status but continuing to have significant headache without other concerning symptoms. Patient is DNR/DNI so no surgical intervention is being pursued. Repeat head CT done early this morning, pending results.

## 2020-08-27 NOTE — PROGRESS NOTE ADULT - SUBJECTIVE AND OBJECTIVE BOX
Subjective: Patient seen and examined. No new events except as noted.   BP has been labile   SBP up to 181     REVIEW OF SYSTEMS:    CONSTITUTIONAL:+ weakness, fevers or chills  EYES/ENT: No visual changes;  No vertigo or throat pain   NECK: No pain or stiffness  RESPIRATORY: No cough, wheezing, hemoptysis; No shortness of breath  CARDIOVASCULAR: No chest pain or palpitations  GASTROINTESTINAL: No abdominal or epigastric pain. No nausea, vomiting, or hematemesis; No diarrhea or constipation. No melena or hematochezia.  GENITOURINARY: No dysuria, frequency or hematuria  NEUROLOGICAL: +numbness or weakness  SKIN: No itching, burning, rashes, or lesions   All other review of systems is negative unless indicated above.    MEDICATIONS:  MEDICATIONS  (STANDING):  amLODIPine   Tablet 10 milliGRAM(s) Oral daily  doxazosin 4 milliGRAM(s) Oral daily  enoxaparin Injectable 40 milliGRAM(s) SubCutaneous <User Schedule>  lisinopril 40 milliGRAM(s) Oral daily  melatonin 5 milliGRAM(s) Oral at bedtime  polyethylene glycol 3350 17 Gram(s) Oral two times a day  senna 2 Tablet(s) Oral at bedtime      PHYSICAL EXAM:  T(C): 36.7 (08-27-20 @ 08:55), Max: 36.9 (08-26-20 @ 23:29)  HR: 81 (08-27-20 @ 08:55) (60 - 91)  BP: 181/78 (08-27-20 @ 08:55) (136/62 - 186/76)  RR: 18 (08-27-20 @ 08:55) (18 - 20)  SpO2: 94% (08-27-20 @ 08:55) (92% - 95%)  Wt(kg): --  I&O's Summary          Appearance: NAD lethargic   HEENT: Dry oral mucosa, PERRL, EOMI	  Lymphatic: No lymphadenopathy  Cardiovascular: Normal S1 S2, No JVD, No murmurs, No edema  Respiratory: Lungs clear to auscultation	  Psychiatry: A & O x 3,sleepy and lethargic appropriate  Gastrointestinal:  Soft, Non-tender, + BS	  Skin: No rashes, No ecchymoses, No cyanosis	  Extremities: Normal range of motion, No clubbing, cyanosis or edema  Vascular: Peripheral pulses palpable 2+ bilaterally  Neurologic:  - Mental Status:  arousable to voice, and requires continued stimulation; oriented to person, place, and time; Speech is fluent when answering questions, able to repeat and follow simple commands. Headache limits cooperation with additional mental status testing; no extinction or neglect noted;   - Cranial Nerves II-XII:  VFF, No nystagmus noted, EOMI, PERRLA, V1-V3 intact, no facial asymmetry, t/p midline, SCM/trap intact.  - Motor:  Normal muscle bulk and tone throughout. No pronator drift noted. No myoclonus or tremor.  - Reflexes: 2+ biceps, brachioradialis, patellar, ankle reflexes. Plantar resp        LABS:    CARDIAC MARKERS:                                10.9   8.17  )-----------( 284      ( 27 Aug 2020 05:37 )             34.9     08-27    133<L>  |  95<L>  |  18  ----------------------------<  111<H>  3.7   |  25  |  0.72    Ca    9.2      27 Aug 2020 05:37      proBNP:   Lipid Profile:   HgA1c:   TSH:             TELEMETRY: 	    ECG:  	  RADIOLOGY:   DIAGNOSTIC TESTING:  [ ] Echocardiogram:  [ ]  Catheterization:  [ ] Stress Test:    OTHER:

## 2020-08-27 NOTE — PROGRESS NOTE ADULT - ASSESSMENT
ASSESSMENT: 86 yo female with PMH HTN, breast cancer (in remission) presented as transfer from [place] with severe headache with right temporal-occipital hemorrhage with falcine SDH confirmed on CT, initially on cardene and admitted to N[facility]. Patient now with improved mental status but continuing to have significant headache without other concerning symptoms. Patient is DNR/DNI so no surgical intervention is being pursued. Repeat head CT done early this morning, pending results.    Impression:  Right posterior temporal/occipital/parietal IPH, right posterior temporal and interhemispheric acute subdural hematoma  presumed cerebral amyloid angiopathy though underlying lesion should be screened for.     NEURO: neurologically without acute change but remains with headache at times, stable cerebral edema, mass effect, and brain compression, repeat CTH as noted, maintain normotension as tolerated < 140mmHg, home statin regimen if applicable, MRI imaging should management be impacted by results, Nsx consult appreciated: . Physical therapy/OT: [facility].    ANTITHROMBOTIC THERAPY: none, ICH.    PULMONARY:  protecting airway, saturating well     CARDIOVASCULAR:  TTE: ef 55%, mitral annular calcification, minimal MR, minimal GA , cardiac monitoring without acute events noted, titration of antihypertensive regimen for further bp control.                              SBP goal: <140mmhg     GASTROINTESTINAL:  dysphagia screen passed, tolerating diet        Diet: regular     RENAL: BUN/Cr without acute change, maintain adequate hydration, mild hyponatremia- repeat BMP pending, good urine output      Na Goal: Greater than 135     Roy: no     HEMATOLOGY: H/H without acute change, no active bleeding, Platelets 284, LE pain- LE duplex negative     DVT ppx: Heparin s.c [] LMWH [x]     ID: afebrile, no leukocytosis, monitor for si/sx of infection     DISPOSITION: [facility]     CORE MEASURES:        Admission NIHSS:      TPA: [] YES [x] NO      LDL/HDL:82/59     Depression Screen: NA- unable to participate -uncomfortable from headache     Statin Therapy: as noted     Dysphagia Screen: [x] PASS [] FAIL     Smoking [] YES [x] NO      Afib [] YES [x] NO     Stroke Education [x] YES [] NO    Obtain screening lower extremity venous ultrasound in patients who meet 1 or more of the following criteria as patient is high risk for DVT/PE on admission:   [] History of DVT/PE  [x]Hypercoagulable states (Factor V Leiden, Cancer, OCP, etc. )  []Prolonged immobility (hemiplegia/hemiparesis/post operative or any other extended immobilization)  [] Transferred from outside facility (Rehab or Long term care)  [] Age </= to 50

## 2020-08-27 NOTE — PROGRESS NOTE ADULT - REASON FOR ADMISSION
PRAKASH MOOREH

## 2020-08-27 NOTE — PROGRESS NOTE ADULT - PROBLEM SELECTOR PLAN 2
add Cardura 4 mg daily   lisinopril 40 mg QD a  Norvasc 10 mg daily   Hydralazine 10 mg IV Q3 hours SBP > 150.

## 2020-08-27 NOTE — DISCHARGE NOTE NURSING/CASE MANAGEMENT/SOCIAL WORK - PATIENT PORTAL LINK FT
You can access the FollowMyHealth Patient Portal offered by [facility] by registering at the following website: [email or web address]. By joining [employer]’s FollowMyHealth portal, you will also be able to view your health information using other applications (apps) compatible with our system.

## 2020-08-27 NOTE — PROGRESS NOTE ADULT - SUBJECTIVE AND OBJECTIVE BOX
THE PATIENT WAS SEEN AND EXAMINED BY ME WITH THE HOUSESTAFF AND STROKE TEAM DURING MORNING ROUNDS.   HPI:  87F with hx of HTN presented as transfer from [place] with ICH on cardene w/possible early uncal herniation on CT. Initially managed in ICU now transfer to stroke unit for further care.    SUBJECTIVE: No events overnight.  No new neurologic complaints.  ROS reported negative unless otherwise noted.     ID    acetaminophen   Tablet .. 650 milliGRAM(s) Oral every 6 hours PRN  amLODIPine   Tablet 10 milliGRAM(s) Oral daily  enoxaparin Injectable 40 milliGRAM(s) SubCutaneous <User Schedule>  hydrALAZINE Injectable 10 milliGRAM(s) IV Push every 3 hours PRN  lisinopril 40 milliGRAM(s) Oral daily  melatonin 5 milliGRAM(s) Oral at bedtime  polyethylene glycol 3350 17 Gram(s) Oral two times a day  senna 2 Tablet(s) Oral at bedtime  traMADol 25 milliGRAM(s) Oral every 4 hours PRN  traMADol 50 milliGRAM(s) Oral every 4 hours PRN      PHYSICAL EXAM:   Vital Signs Last 24 Hrs  T(C): 36.6 (27 Aug 2020 04:36), Max: 36.9 (26 Aug 2020 23:29)  T(F): 97.8 (27 Aug 2020 04:36), Max: 98.5 (26 Aug 2020 23:29)  HR: 61 (27 Aug 2020 04:36) (60 - 91)  BP: 136/62 (27 Aug 2020 04:36) (136/62 - 186/76)  RR: 18 (27 Aug 2020 04:36) (18 - 20)  SpO2: 92% (27 Aug 2020 04:36) (92% - 95%)    General: uncomfortable , exam limited due to discomfort   HEENT: EOM appear intact   Abdomen: Soft, nontender, nondistended   Extremities: No edema    NEUROLOGICAL EXAM:  Mental status: Awake, alert,  oriented to person,  place, time. able to follow simple commands   Cranial Nerves: No facial asymmetry appreciated, no dysarthria,  tongue midline  Motor exam:  moves extremities against gravity within bed   Sensation: Intact to light touch   Coordination/ Gait: gait not assessed     LABS:                        10.9   8.17  )-----------( 284      ( 27 Aug 2020 05:37 )             34.9    08-27    133<L>  |  95<L>  |  18  ----------------------------<  111<H>  3.7   |  25  |  0.72    Ca    9.2      27 Aug 2020 05:37    IMAGING: Reviewed by me.     CT Head No Cont (08.25.20)  Heterogeneous area of high attenuation on the right posterior temporal/occipital/parietal region. Surrounding edema is identified. This compatible with an area of acute parenchymal hemorrhage. Thisfinding measures approximately 6.5 x 4.2 cm and previously measured approximately 7.2 x 3.5 cm on prior head CT performed on August 24, 2020.  Extra-axial high attenuation involving the right posterior temporal region is again seen, this is likely compatible with an area of acute subdural hematoma. This finding measures approximately 1.4 cm widest diameter and previously measured approximately 1.3 cm widest diameter.  Acute subdural hematoma along the interhemispheric region on the right sideis again seen. This finding measures approximately 0.2 cm widest diameter and previously measured approximately 0.5 cm widest diameter.  No significant shift or herniation is seen.  Localized mass effect consisting of sulcal effacement is seen.    CT Head No Cont (08.24.20)  Evidence of a right temporal parietal occipital acute hemorrhage as identified on the prior imaging study. On the current evaluation slight increase in the midline shift now measuring 4.5 mm previously measuring 3.5 mm. Questionable subtle slight increased mass effect on the right lateral ventricle. Minimal evidence of early uncal herniation. Intraventricular hemorrhage is seen in the occipital horn of the right lateral ventricle    (08.23.20)  1. CTA brain: There is no large vessel occlusion or aneurysm involving major proximal intracranial arteries.  2. CTA NECK: There is no stenosis involving major neck arteries. THE PATIENT WAS SEEN AND EXAMINED BY ME WITH THE HOUSESTAFF AND STROKE TEAM DURING MORNING ROUNDS.   HPI:  87F with hx of HTN presented as transfer from [place] with ICH on cardene w/possible early uncal herniation on CT. Initially managed in ICU now transfer to stroke unit for further care.    SUBJECTIVE: No events overnight.  No new neurologic complaints.  ROS reported negative unless otherwise noted.     ID [id number]    acetaminophen   Tablet .. 650 milliGRAM(s) Oral every 6 hours PRN  amLODIPine   Tablet 10 milliGRAM(s) Oral daily  enoxaparin Injectable 40 milliGRAM(s) SubCutaneous <User Schedule>  hydrALAZINE Injectable 10 milliGRAM(s) IV Push every 3 hours PRN  lisinopril 40 milliGRAM(s) Oral daily  melatonin 5 milliGRAM(s) Oral at bedtime  polyethylene glycol 3350 17 Gram(s) Oral two times a day  senna 2 Tablet(s) Oral at bedtime  traMADol 25 milliGRAM(s) Oral every 4 hours PRN  traMADol 50 milliGRAM(s) Oral every 4 hours PRN      PHYSICAL EXAM:   Vital Signs Last 24 Hrs  T(C): 36.6 (27 Aug 2020 04:36), Max: 36.9 (26 Aug 2020 23:29)  T(F): 97.8 (27 Aug 2020 04:36), Max: 98.5 (26 Aug 2020 23:29)  HR: 61 (27 Aug 2020 04:36) (60 - 91)  BP: 136/62 (27 Aug 2020 04:36) (136/62 - 186/76)  RR: 18 (27 Aug 2020 04:36) (18 - 20)  SpO2: 92% (27 Aug 2020 04:36) (92% - 95%)    General: uncomfortable , exam limited due to discomfort   HEENT: EOM appear intact   Abdomen: Soft, nontender, nondistended   Extremities: No edema    NEUROLOGICAL EXAM:  Mental status: Awake, alert,  oriented to person,  place, time. able to follow simple commands   Cranial Nerves: No facial asymmetry appreciated, no dysarthria,  tongue midline  Motor exam:  moves extremities against gravity within bed   Sensation: Intact to light touch   Coordination/ Gait: gait not assessed     LABS:                        10.9   8.17  )-----------( 284      ( 27 Aug 2020 05:37 )             34.9    08-27    133<L>  |  95<L>  |  18  ----------------------------<  111<H>  3.7   |  25  |  0.72    Ca    9.2      27 Aug 2020 05:37    IMAGING: Reviewed by me.     CT Head No Cont (08.25.20)  Heterogeneous area of high attenuation on the right posterior temporal/occipital/parietal region. Surrounding edema is identified. This compatible with an area of acute parenchymal hemorrhage. Thisfinding measures approximately 6.5 x 4.2 cm and previously measured approximately 7.2 x 3.5 cm on prior head CT performed on August 24, 2020.  Extra-axial high attenuation involving the right posterior temporal region is again seen, this is likely compatible with an area of acute subdural hematoma. This finding measures approximately 1.4 cm widest diameter and previously measured approximately 1.3 cm widest diameter.  Acute subdural hematoma along the interhemispheric region on the right sideis again seen. This finding measures approximately 0.2 cm widest diameter and previously measured approximately 0.5 cm widest diameter.  No significant shift or herniation is seen.  Localized mass effect consisting of sulcal effacement is seen.    CT Head No Cont (08.24.20)  Evidence of a right temporal parietal occipital acute hemorrhage as identified on the prior imaging study. On the current evaluation slight increase in the midline shift now measuring 4.5 mm previously measuring 3.5 mm. Questionable subtle slight increased mass effect on the right lateral ventricle. Minimal evidence of early uncal herniation. Intraventricular hemorrhage is seen in the occipital horn of the right lateral ventricle    (08.23.20)  1. CTA brain: There is no large vessel occlusion or aneurysm involving major proximal intracranial arteries.  2. CTA NECK: There is no stenosis involving major neck arteries.

## 2020-08-30 ENCOUNTER — INPATIENT (INPATIENT)
Facility: HOSPITAL | Age: 85
LOS: 4 days | Discharge: NURSING, REHAB OR LONG-TERM CARE | DRG: 871 | End: 2020-09-04
Attending: INTERNAL MEDICINE | Admitting: INTERNAL MEDICINE
Payer: MEDICARE

## 2020-08-30 VITALS
OXYGEN SATURATION: 97 % | WEIGHT: 130.07 LBS | DIASTOLIC BLOOD PRESSURE: 72 MMHG | SYSTOLIC BLOOD PRESSURE: 145 MMHG | HEIGHT: 66 IN | TEMPERATURE: 100 F | RESPIRATION RATE: 18 BRPM | HEART RATE: 90 BPM

## 2020-08-30 DIAGNOSIS — I10 ESSENTIAL (PRIMARY) HYPERTENSION: ICD-10-CM

## 2020-08-30 DIAGNOSIS — I62.9 NONTRAUMATIC INTRACRANIAL HEMORRHAGE, UNSPECIFIED: ICD-10-CM

## 2020-08-30 DIAGNOSIS — A41.9 SEPSIS, UNSPECIFIED ORGANISM: ICD-10-CM

## 2020-08-30 DIAGNOSIS — R55 SYNCOPE AND COLLAPSE: ICD-10-CM

## 2020-08-30 DIAGNOSIS — N39.0 URINARY TRACT INFECTION, SITE NOT SPECIFIED: ICD-10-CM
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
## 2020-08-30 DIAGNOSIS — Z29.9 ENCOUNTER FOR PROPHYLACTIC MEASURES, UNSPECIFIED: ICD-10-CM

## 2020-08-30 DIAGNOSIS — Z98.890 OTHER SPECIFIED POSTPROCEDURAL STATES: Chronic | ICD-10-CM

## 2020-08-30 LAB
ALBUMIN SERPL ELPH-MCNC: 3.2 G/DL — LOW (ref 3.5–5)
ALP SERPL-CCNC: 76 U/L — SIGNIFICANT CHANGE UP (ref 40–120)
ALT FLD-CCNC: 26 U/L DA — SIGNIFICANT CHANGE UP (ref 10–60)
ANION GAP SERPL CALC-SCNC: 10 MMOL/L — SIGNIFICANT CHANGE UP (ref 5–17)
APPEARANCE UR: SIGNIFICANT CHANGE UP
APTT BLD: 32.4 SEC — SIGNIFICANT CHANGE UP (ref 27.5–35.5)
AST SERPL-CCNC: 24 U/L — SIGNIFICANT CHANGE UP (ref 10–40)
BASOPHILS # BLD AUTO: 0 K/UL — SIGNIFICANT CHANGE UP (ref 0–0.2)
BASOPHILS NFR BLD AUTO: 0 % — SIGNIFICANT CHANGE UP (ref 0–2)
BILIRUB SERPL-MCNC: 1.4 MG/DL — HIGH (ref 0.2–1.2)
BILIRUB UR-MCNC: NEGATIVE — SIGNIFICANT CHANGE UP
BUN SERPL-MCNC: 22 MG/DL — HIGH (ref 7–18)
CALCIUM SERPL-MCNC: 9 MG/DL — SIGNIFICANT CHANGE UP (ref 8.4–10.5)
CHLORIDE SERPL-SCNC: 100 MMOL/L — SIGNIFICANT CHANGE UP (ref 96–108)
CO2 SERPL-SCNC: 27 MMOL/L — SIGNIFICANT CHANGE UP (ref 22–31)
COLOR SPEC: YELLOW — SIGNIFICANT CHANGE UP
CREAT SERPL-MCNC: 0.86 MG/DL — SIGNIFICANT CHANGE UP (ref 0.5–1.3)
DIFF PNL FLD: ABNORMAL
EOSINOPHIL # BLD AUTO: 0 K/UL — SIGNIFICANT CHANGE UP (ref 0–0.5)
EOSINOPHIL NFR BLD AUTO: 0 % — SIGNIFICANT CHANGE UP (ref 0–6)
GLUCOSE SERPL-MCNC: 117 MG/DL — HIGH (ref 70–99)
GLUCOSE UR QL: NEGATIVE — SIGNIFICANT CHANGE UP
HCT VFR BLD CALC: 35.5 % — SIGNIFICANT CHANGE UP (ref 34.5–45)
HGB BLD-MCNC: 11.7 G/DL — SIGNIFICANT CHANGE UP (ref 11.5–15.5)
INR BLD: 1.11 RATIO — SIGNIFICANT CHANGE UP (ref 0.88–1.16)
KETONES UR-MCNC: ABNORMAL
LACTATE SERPL-SCNC: 1.3 MMOL/L — SIGNIFICANT CHANGE UP (ref 0.7–2)
LACTATE SERPL-SCNC: 2.1 MMOL/L — HIGH (ref 0.7–2)
LEUKOCYTE ESTERASE UR-ACNC: ABNORMAL
LIDOCAIN IGE QN: 72 U/L — LOW (ref 73–393)
LYMPHOCYTES # BLD AUTO: 1.11 K/UL — SIGNIFICANT CHANGE UP (ref 1–3.3)
LYMPHOCYTES # BLD AUTO: 15 % — SIGNIFICANT CHANGE UP (ref 13–44)
MCHC RBC-ENTMCNC: 28.3 PG — SIGNIFICANT CHANGE UP (ref 27–34)
MCHC RBC-ENTMCNC: 33 GM/DL — SIGNIFICANT CHANGE UP (ref 32–36)
MCV RBC AUTO: 85.7 FL — SIGNIFICANT CHANGE UP (ref 80–100)
MONOCYTES # BLD AUTO: 0.44 K/UL — SIGNIFICANT CHANGE UP (ref 0–0.9)
MONOCYTES NFR BLD AUTO: 6 % — SIGNIFICANT CHANGE UP (ref 2–14)
NEUTROPHILS # BLD AUTO: 5.84 K/UL — SIGNIFICANT CHANGE UP (ref 1.8–7.4)
NEUTROPHILS NFR BLD AUTO: 79 % — HIGH (ref 43–77)
NITRITE UR-MCNC: POSITIVE
PH UR: 6 — SIGNIFICANT CHANGE UP (ref 5–8)
PLATELET # BLD AUTO: 328 K/UL — SIGNIFICANT CHANGE UP (ref 150–400)
POTASSIUM SERPL-MCNC: 3 MMOL/L — LOW (ref 3.5–5.3)
POTASSIUM SERPL-SCNC: 3 MMOL/L — LOW (ref 3.5–5.3)
PROT SERPL-MCNC: 7.8 G/DL — SIGNIFICANT CHANGE UP (ref 6–8.3)
PROT UR-MCNC: 30 MG/DL
PROTHROM AB SERPL-ACNC: 12.9 SEC — SIGNIFICANT CHANGE UP (ref 10.6–13.6)
RBC # BLD: 4.14 M/UL — SIGNIFICANT CHANGE UP (ref 3.8–5.2)
RBC # FLD: 14.6 % — HIGH (ref 10.3–14.5)
SARS-COV-2 RNA SPEC QL NAA+PROBE: SIGNIFICANT CHANGE UP
SODIUM SERPL-SCNC: 137 MMOL/L — SIGNIFICANT CHANGE UP (ref 135–145)
SP GR SPEC: 1.01 — SIGNIFICANT CHANGE UP (ref 1.01–1.02)
TROPONIN I SERPL-MCNC: <0.015 NG/ML — SIGNIFICANT CHANGE UP (ref 0–0.04)
UROBILINOGEN FLD QL: 1
WBC # BLD: 7.39 K/UL — SIGNIFICANT CHANGE UP (ref 3.8–10.5)
WBC # FLD AUTO: 7.39 K/UL — SIGNIFICANT CHANGE UP (ref 3.8–10.5)

## 2020-08-30 PROCEDURE — 70450 CT HEAD/BRAIN W/O DYE: CPT | Mod: 26

## 2020-08-30 PROCEDURE — 99285 EMERGENCY DEPT VISIT HI MDM: CPT | Mod: 25

## 2020-08-30 PROCEDURE — 74176 CT ABD & PELVIS W/O CONTRAST: CPT | Mod: 26

## 2020-08-30 PROCEDURE — 71250 CT THORAX DX C-: CPT | Mod: 26

## 2020-08-30 PROCEDURE — 71046 X-RAY EXAM CHEST 2 VIEWS: CPT | Mod: 26

## 2020-08-30 RX ORDER — POLYETHYLENE GLYCOL 3350 17 G/17G
17 POWDER, FOR SOLUTION ORAL
Refills: 0 | Status: DISCONTINUED | OUTPATIENT
Start: 2020-08-30 | End: 2020-09-04

## 2020-08-30 RX ORDER — ACETAMINOPHEN 500 MG
1000 TABLET ORAL ONCE
Refills: 0 | Status: COMPLETED | OUTPATIENT
Start: 2020-08-30 | End: 2020-08-30

## 2020-08-30 RX ORDER — METOCLOPRAMIDE HCL 10 MG
10 TABLET ORAL ONCE
Refills: 0 | Status: DISCONTINUED | OUTPATIENT
Start: 2020-08-30 | End: 2020-09-04

## 2020-08-30 RX ORDER — POTASSIUM CHLORIDE 20 MEQ
40 PACKET (EA) ORAL EVERY 4 HOURS
Refills: 0 | Status: COMPLETED | OUTPATIENT
Start: 2020-08-30 | End: 2020-08-31

## 2020-08-30 RX ORDER — HYDRALAZINE HCL 50 MG
5 TABLET ORAL ONCE
Refills: 0 | Status: COMPLETED | OUTPATIENT
Start: 2020-08-30 | End: 2020-08-30

## 2020-08-30 RX ORDER — SODIUM CHLORIDE 9 MG/ML
1850 INJECTION INTRAMUSCULAR; INTRAVENOUS; SUBCUTANEOUS ONCE
Refills: 0 | Status: COMPLETED | OUTPATIENT
Start: 2020-08-30 | End: 2020-08-30

## 2020-08-30 RX ORDER — DOXAZOSIN MESYLATE 4 MG
4 TABLET ORAL AT BEDTIME
Refills: 0 | Status: DISCONTINUED | OUTPATIENT
Start: 2020-08-30 | End: 2020-09-04

## 2020-08-30 RX ORDER — CEFTRIAXONE 500 MG/1
1000 INJECTION, POWDER, FOR SOLUTION INTRAMUSCULAR; INTRAVENOUS EVERY 24 HOURS
Refills: 0 | Status: ACTIVE | OUTPATIENT
Start: 2020-08-30 | End: 2020-09-06

## 2020-08-30 RX ORDER — LISINOPRIL 2.5 MG/1
40 TABLET ORAL DAILY
Refills: 0 | Status: DISCONTINUED | OUTPATIENT
Start: 2020-08-30 | End: 2020-09-04

## 2020-08-30 RX ORDER — PIPERACILLIN AND TAZOBACTAM 4; .5 G/20ML; G/20ML
3.38 INJECTION, POWDER, LYOPHILIZED, FOR SOLUTION INTRAVENOUS ONCE
Refills: 0 | Status: COMPLETED | OUTPATIENT
Start: 2020-08-30 | End: 2020-08-30

## 2020-08-30 RX ORDER — POLYETHYLENE GLYCOL 3350 17 G/17G
17 POWDER, FOR SOLUTION ORAL ONCE
Refills: 0 | Status: COMPLETED | OUTPATIENT
Start: 2020-08-30 | End: 2020-08-30

## 2020-08-30 RX ORDER — KETOROLAC TROMETHAMINE 30 MG/ML
30 SYRINGE (ML) INJECTION EVERY 6 HOURS
Refills: 0 | Status: DISCONTINUED | OUTPATIENT
Start: 2020-08-30 | End: 2020-09-04

## 2020-08-30 RX ORDER — ACETAMINOPHEN 500 MG
650 TABLET ORAL ONCE
Refills: 0 | Status: COMPLETED | OUTPATIENT
Start: 2020-08-30 | End: 2020-08-30

## 2020-08-30 RX ORDER — OXYCODONE AND ACETAMINOPHEN 5; 325 MG/1; MG/1
1 TABLET ORAL EVERY 4 HOURS
Refills: 0 | Status: DISCONTINUED | OUTPATIENT
Start: 2020-08-30 | End: 2020-09-03

## 2020-08-30 RX ORDER — SENNA PLUS 8.6 MG/1
2 TABLET ORAL AT BEDTIME
Refills: 0 | Status: DISCONTINUED | OUTPATIENT
Start: 2020-08-30 | End: 2020-09-04

## 2020-08-30 RX ORDER — AMLODIPINE BESYLATE 2.5 MG/1
10 TABLET ORAL DAILY
Refills: 0 | Status: DISCONTINUED | OUTPATIENT
Start: 2020-08-30 | End: 2020-09-04

## 2020-08-30 RX ORDER — ACETAMINOPHEN 500 MG
650 TABLET ORAL EVERY 4 HOURS
Refills: 0 | Status: DISCONTINUED | OUTPATIENT
Start: 2020-08-30 | End: 2020-09-04

## 2020-08-30 RX ADMIN — Medication 4 MILLIGRAM(S): at 22:26

## 2020-08-30 RX ADMIN — Medication 30 MILLIGRAM(S): at 19:49

## 2020-08-30 RX ADMIN — SODIUM CHLORIDE 1850 MILLILITER(S): 9 INJECTION INTRAMUSCULAR; INTRAVENOUS; SUBCUTANEOUS at 12:57

## 2020-08-30 RX ADMIN — POLYETHYLENE GLYCOL 3350 17 GRAM(S): 17 POWDER, FOR SOLUTION ORAL at 18:33

## 2020-08-30 RX ADMIN — CEFTRIAXONE 100 MILLIGRAM(S): 500 INJECTION, POWDER, FOR SOLUTION INTRAMUSCULAR; INTRAVENOUS at 18:47

## 2020-08-30 RX ADMIN — PIPERACILLIN AND TAZOBACTAM 3.38 GRAM(S): 4; .5 INJECTION, POWDER, LYOPHILIZED, FOR SOLUTION INTRAVENOUS at 13:41

## 2020-08-30 RX ADMIN — Medication 5 MILLIGRAM(S): at 16:16

## 2020-08-30 RX ADMIN — Medication 30 MILLIGRAM(S): at 18:33

## 2020-08-30 RX ADMIN — SODIUM CHLORIDE 1850 MILLILITER(S): 9 INJECTION INTRAMUSCULAR; INTRAVENOUS; SUBCUTANEOUS at 14:20

## 2020-08-30 RX ADMIN — Medication 40 MILLIEQUIVALENT(S): at 18:33

## 2020-08-30 RX ADMIN — PIPERACILLIN AND TAZOBACTAM 200 GRAM(S): 4; .5 INJECTION, POWDER, LYOPHILIZED, FOR SOLUTION INTRAVENOUS at 13:11

## 2020-08-30 RX ADMIN — SENNA PLUS 2 TABLET(S): 8.6 TABLET ORAL at 22:26

## 2020-08-30 RX ADMIN — LISINOPRIL 40 MILLIGRAM(S): 2.5 TABLET ORAL at 18:34

## 2020-08-30 RX ADMIN — Medication 400 MILLIGRAM(S): at 18:33

## 2020-08-30 RX ADMIN — Medication 1000 MILLIGRAM(S): at 21:07

## 2020-08-30 RX ADMIN — Medication 5 MILLIGRAM(S): at 18:47

## 2020-08-30 RX ADMIN — Medication 40 MILLIEQUIVALENT(S): at 22:26

## 2020-08-30 NOTE — H&P ADULT - HISTORY OF PRESENT ILLNESS
87 female with medical history of HTN and ICH (diagnosed on 23rd august )  was sent in from [facility] rehab after a fall . Patient is AAO X 2 is a very poor historian . History was taken with the help of friend at bed side . Patient was recently discharged after she was diagnosed with ICH bleed to [facility] rehab and there she had a fall . no specific details about fall are known Patients friend at bed side told us that she got acall from nursing home that patient had a fall and she is being transferred to hospital. Patient complains of abdominal pain and pain while urination associated with fever and chills.    ED : patient had a temp of 100.7 with   normal WBC. patients UA was sent and CT abdomen Pelvis and CT head was done.    Patients UA is positive for WBC 11-25 with positive nitrites and leukocyte esterase.  Patient urine and blood Cx were sent and she was given a dose of Zosyn . 87 female with medical history of HTN and ICH (diagnosed on 23rd August)  [facility]. Patient is AAO X 2 is a very poor historian. History was taken with the help of friend at bedside. Patient was recently discharged after she was diagnosed with ICH bleed to [facility] rehab and there she had a fall. No specific details about fall are known. Patients friend at bedside told us that she got a call from nursing home that patient had a fall and she is being transferred to hospital. Patient complains of abdominal pain and pain while urination associated with fever and chills.    ED: patient had a temp of 100.7F with ,  normal WBC. Patient's UA was sent and CT abdomen Pelvis and CT head was done.    Patients UA is positive for WBC 11-25 with positive nitrites and leukocyte esterase.  Patient urine and blood Cx were sent and she was given a dose of IV Zosyn .

## 2020-08-30 NOTE — ED ADULT NURSE NOTE - CHIEF COMPLAINT QUOTE
s/p fall last night with hematoma to right eye and c/o upper abdominal pain. Pt reports walking to bathroom and doesn't remember how she fell

## 2020-08-30 NOTE — H&P ADULT - PROBLEM SELECTOR PLAN 6
RISK                                                          Points  [] Previous VTE                                           3  [] Thrombophilia                                        2  [] Lower limb paralysis                              2   [] Current Cancer                                       2   [x] Immobilization > 24 hrs                        1  [] ICU/CCU stay > 24 hours                       1  [x] Age > 60                                                   1    holding off chemo prophylaxis for DVT for recent ICH, jordan continue with SCDs

## 2020-08-30 NOTE — H&P ADULT - NSHPSOCIALHISTORY_GEN_ALL_CORE
Patient lives alone at home, no smoking , Alcohol or illicit drug use reported. Patient lives alone at home, no smoking, alcohol or illicit drug use reported.

## 2020-08-30 NOTE — H&P ADULT - NSICDXPASTSURGICALHX_GEN_ALL_CORE_FT
PAST SURGICAL HISTORY:  H/O mastectomy     History of colon resection     No significant past surgical history

## 2020-08-30 NOTE — H&P ADULT - PROBLEM SELECTOR PLAN 1
patient had a temp of 100.7 with   normal WBC. patients UA was sent and CT abdomen Pelvis and CT head was done.    Patients UA is positive for WBC 11-25 with positive nitrites and leukocyte esterase.  Patient urine and blood Cx were sent and she was given a dose of Zosyn  will start the patient on Rocephin  CT chest and abdomen -ve for any acute changes or any signs of infection.  Patient had elevated lactate , trended down to normal.  F/u blood and urine Cx Patients UA is positive for WBC 11-25 with positive nitrites and leukocyte esterase.  Patient urine cx sent  will start the patient on IV Rocephin

## 2020-08-30 NOTE — H&P ADULT - PROBLEM SELECTOR PLAN 2
Patient had a fall at rehab.  CT head was done it is showing stable Intra cranial bleed compared to previous admission  Right temporal parietal/occipital hemorrhage and presumed infarct is again identified with with no significant change. Continued correlation and follow-up is recommended.  Echo done on August 24, 2020 normal systolic function .  Will monitor the patient on tele.  Fall precautions patient had a temp of 100.7F with   normal WBC. patients UA was sent and CT abdomen Pelvis and CT head was done.    Patients UA is positive for WBC 11-25 with positive nitrites and leukocyte esterase.  Patient urine and blood Cx were sent and she was given a dose of Zosyn  will start the patient on IV Rocephin  CT chest and abdomen -ve for any acute changes or any signs of infection.  Patient had elevated lactate , trended down to normal.  F/u blood and urine Cx

## 2020-08-30 NOTE — ED ADULT NURSE NOTE - CHPI ED NUR SYMPTOMS NEG
no blood in stool/no burning urination/no abdominal distension/no dysuria/no hematuria/no nausea/no vomiting/no diarrhea

## 2020-08-30 NOTE — H&P ADULT - PROBLEM SELECTOR PLAN 4
Patient has hx of ICH.  CT head showed CT head was done it is showing stable Intra cranial bleed compared to previous admission  Goal Bp < 140 Patient is on lisinopril and amlodipine at home .  Cw with home meds.  Goal systolic  blood pressure < 140

## 2020-08-30 NOTE — ED PROVIDER NOTE - NEUROLOGICAL, MLM
Subjective:      Patient ID: Myesha Quinones is a 80 y.o. female.    Chief Complaint: No chief complaint on file.    HPI  80 year old female presents with chief complaint of right knee pain x 3 weeks. She denies trauma. Pain is anterior and posterior. It is worse with kneeling and going from sit to stand. Tylenol gives some relief. She has h/o CKD. She does not use assistive devices. She had some injections in her right knee about 15 years ago that gave her good relief.   Review of Systems   Constitution: Negative for chills, fever and night sweats.   Cardiovascular: Negative for chest pain.   Respiratory: Negative for cough and shortness of breath.    Hematologic/Lymphatic: Does not bruise/bleed easily.   Skin: Negative for color change.   Gastrointestinal: Negative for heartburn.   Genitourinary: Negative for dysuria.   Neurological: Negative for numbness and paresthesias.   Psychiatric/Behavioral: Negative for altered mental status.   Allergic/Immunologic: Negative for persistent infections.         Objective:            General    Vitals reviewed.  Constitutional: She is oriented to person, place, and time. She appears well-developed and well-nourished.   Cardiovascular: Normal rate.    Neurological: She is alert and oriented to person, place, and time.             Right Knee Exam:  ROM 0-115 degrees  No effusion  No warmth or erythema  TTP medial and lateral joint line      X-ray: reviewed by myself. There is mild-moderate DJD chondrocalcinosis and a mild varus deformity.  No fracture dislocation bone destruction or OCD seen.       Assessment:       Encounter Diagnosis   Name Primary?    Primary osteoarthritis of right knee Yes          Plan:       Discussed treatment options with patient. She would like an injection. Ice and elevate knee. RTC prn.     PROCEDURE:  I have explained the risks, benefits, and alternatives of the procedure in detail.  The patient voices understanding and all questions have  been answered.  The patient agrees to proceed as planned. So after I performed a sterile prep of the skin in the normal fashion the right knee is injected using a 22 gauge needle from the anteromedial approach with a combination of 4cc 1% plain lidocaine and 80 mg of depo medrol.  The patient is cautioned and immediate relief of pain is secondary to the local anesthetic and will be temporary.  After the anesthetic wears off there may be a increase in pain that may last for a few hours or a few days and they should use ice to help alleviate this flair up of pain.     The patient is cautioned that the steroid injection may raise their blood sugar level temporarily and this may last for several days.  The patient is instructed to watch their blood sugar closely and if it starts to rise to contact their primary care or diabetes provider.          Alert and oriented, no focal deficits, no motor or sensory deficits.

## 2020-08-30 NOTE — H&P ADULT - PROBLEM SELECTOR PLAN 5
RISK                                                          Points  [] Previous VTE                                           3  [] Thrombophilia                                        2  [] Lower limb paralysis                              2   [] Current Cancer                                       2   [x] Immobilization > 24 hrs                        1  [] ICU/CCU stay > 24 hours                       1  [x] Age > 60                                                   1    holding off chemo prophylaxis for DVT for recent ICH, jordan continue with SCDs Patient has hx of ICH.  CT head showed CT head was done it is showing stable Intra cranial bleed compared to previous admission  Goal Bp < 140  F/u neurology consult

## 2020-08-30 NOTE — ED PROVIDER NOTE - CLINICAL SUMMARY MEDICAL DECISION MAKING FREE TEXT BOX
pt arrives after being found on floor at rehab   unclear of events.   pt with headache and abd pain.   will get ct head and ct ab/pelvis will check labs and ekg and admit  pt is DNR as per nsg home papers

## 2020-08-30 NOTE — ED ADULT NURSE NOTE - OBJECTIVE STATEMENT
Patient present to ED sent from [facility] c/o abdominal pain X2 days , + MS abdominal tenderness noted denies N/V. patient also noted to have fall as per friends at bedside fall was today and patient states " it was 2 days ago." Right eye bruising and swelling noted with right arm bruising

## 2020-08-30 NOTE — ED PROVIDER NOTE - OBJECTIVE STATEMENT
87 year old from nursing home s/p fall (unsure if today or two days ago)  now also with abd pain.  unsure if any LOC.   no cp  arrives with fever, pt was unaware that she had fever 87 year old brought in by ems for being found on the floor of her acute rehab center.   unclear of events. but oriented to name and place   fell last week and was admitted for ICH

## 2020-08-30 NOTE — H&P ADULT - NSHPPHYSICALEXAM_GEN_ALL_CORE
Vital Signs Last 24 Hrs  T(C): 37.3 (30 Aug 2020 15:00), Max: 38.2 (30 Aug 2020 12:45)  T(F): 99.2 (30 Aug 2020 15:00), Max: 100.7 (30 Aug 2020 12:45)  HR: 102 (30 Aug 2020 16:13) (90 - 110)  BP: 180/73 (30 Aug 2020 16:13) (100/67 - 181/74)  BP(mean): --  RR: 18 (30 Aug 2020 16:13) (18 - 20)  SpO2: 95% (30 Aug 2020 16:13) (95% - 98%)    GENERAL: elderly female drowsy and lethargic   HEAD:  Atraumatic, Normocephalic  EYES: EOMI, PERRLA, conjunctiva and sclera clear  NECK: Supple, No JVD  CHEST/LUNG: Clear to auscultation bilaterally; No wheeze; No crackles; No accessory muscles used  HEART: Regular rate and rhythm; No murmurs;   ABDOMEN: Soft, Nondistended; Bowel sounds present; tenderness positive. R sided surgical scar noted  EXTREMITIES:  2+ Peripheral Pulses, No cyanosis or edema  PSYCH:  Normal Affect  NEUROLOGY: non-focal, AAO X 2.   SKIN: No rashes or lesions Vital Signs Last 24 Hrs  T(C): 37.3 (30 Aug 2020 15:00), Max: 38.2 (30 Aug 2020 12:45)  T(F): 99.2 (30 Aug 2020 15:00), Max: 100.7 (30 Aug 2020 12:45)  HR: 102 (30 Aug 2020 16:13) (90 - 110)  BP: 180/73 (30 Aug 2020 16:13) (100/67 - 181/74)  RR: 18 (30 Aug 2020 16:13) (18 - 20)  SpO2: 95% (30 Aug 2020 16:13) (95% - 98%)    GENERAL: elderly female drowsy and lethargic   HEAD:  Atraumatic, Normocephalic  EYES: EOMI, PERRL, conjunctiva and sclera clear  NECK: Supple, No JVD  CHEST/LUNG: Clear to auscultation bilaterally; No wheeze; No crackles; No accessory muscles used  HEART: Regular rate and rhythm; No murmurs;   ABDOMEN: Soft, Nondistended; Bowel sounds present; tenderness positive. Right sided surgical scar noted  EXTREMITIES:  2+ Peripheral Pulses, No cyanosis or edema  PSYCH:  Normal Affect  NEUROLOGY: non-focal, AAO X 2.   SKIN: No rashes or lesions

## 2020-08-30 NOTE — ED ADULT TRIAGE NOTE - CHIEF COMPLAINT QUOTE
s/p fall last night with hematoma to right eye and upper abdominal pain. Pt reports walking to bathroom and doesn't remember how she fell s/p fall last night with hematoma to right eye and c/o upper abdominal pain. Pt reports walking to bathroom and doesn't remember how she fell

## 2020-08-30 NOTE — H&P ADULT - PROBLEM SELECTOR PLAN 3
Patient is on lisinopril and amlodipine at home .  Cw with home meds.  Goal systolic  blood pressure < 140 Patient had a fall at rehab.  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 Continued correlation and follow-up is recommended.  Echo done on August 24, 2020 normal systolic function .  Will monitor the patient on tele.  Fall precautions

## 2020-08-30 NOTE — H&P ADULT - NSICDXPASTMEDICALHX_GEN_ALL_CORE_FT
PAST MEDICAL HISTORY:  Breast CA     Breast cancer right s/p resection    Essential hypertension     HTN (hypertension)

## 2020-08-30 NOTE — H&P ADULT - ATTENDING COMMENTS
Patient seen and examined in ED. Patient's history, vitals, labs, imaging studies reviewed. Discussed with above resident, agree with note with edits and educated on the diagnosis and management of above medical conditions. Patient is DNR/DNI. HCP is her sister Magui - [phone]. Plan of care discussed with patient, and agrees, all questions answered.   Lashawn Howard MD

## 2020-08-30 NOTE — H&P ADULT - ASSESSMENT
87 female with medical history of HTN and ICH (diagnosed on 23rd august )  was sent in from [facility] rehab after a fall . Patient is AAO X 2 is a very poor historian . History was taken with the help of friend at bed side . Patient was recently discharged after she was diagnosed with ICH bleed to [facility] rehab and there she had a fall . no specific details about fall are known Patients friend at bed side told us that she got acall from nursing home that patient had a fall and she is being transferred to hospital. Patient complains of abdominal pain and pain while urination associated with fever and chills.    Patient is admitted for Sepsis secondary to UTI. 87 female with medical history of HTN and ICH (diagnosed on 23rd August)  was sent in from [facility] rehab after a fall. Patient is AAO X 2 is a very poor historian. History was taken with the help of friend at bed side. Patient was recently discharged after she was diagnosed with ICH bleed to [facility] rehab and there she had a fall. No specific details about fall are known Patients friend at bed side told us that she got a call from nursing home that patient had a fall and she is being transferred to hospital. Patient complains of abdominal pain and pain while urination associated with fever and chills.    Patient is admitted for Sepsis secondary to UTI.

## 2020-08-31 LAB
A1C WITH ESTIMATED AVERAGE GLUCOSE RESULT: 5 % — SIGNIFICANT CHANGE UP (ref 4–5.6)
ALBUMIN SERPL ELPH-MCNC: 2.5 G/DL — LOW (ref 3.5–5)
ALP SERPL-CCNC: 59 U/L — SIGNIFICANT CHANGE UP (ref 40–120)
ALT FLD-CCNC: 20 U/L DA — SIGNIFICANT CHANGE UP (ref 10–60)
ANION GAP SERPL CALC-SCNC: 6 MMOL/L — SIGNIFICANT CHANGE UP (ref 5–17)
AST SERPL-CCNC: 24 U/L — SIGNIFICANT CHANGE UP (ref 10–40)
BASOPHILS # BLD AUTO: 0.01 K/UL — SIGNIFICANT CHANGE UP (ref 0–0.2)
BASOPHILS NFR BLD AUTO: 0.1 % — SIGNIFICANT CHANGE UP (ref 0–2)
BILIRUB SERPL-MCNC: 1 MG/DL — SIGNIFICANT CHANGE UP (ref 0.2–1.2)
BUN SERPL-MCNC: 17 MG/DL — SIGNIFICANT CHANGE UP (ref 7–18)
CALCIUM SERPL-MCNC: 7.7 MG/DL — LOW (ref 8.4–10.5)
CHLORIDE SERPL-SCNC: 109 MMOL/L — HIGH (ref 96–108)
CO2 SERPL-SCNC: 23 MMOL/L — SIGNIFICANT CHANGE UP (ref 22–31)
CREAT SERPL-MCNC: 0.68 MG/DL — SIGNIFICANT CHANGE UP (ref 0.5–1.3)
EOSINOPHIL # BLD AUTO: 0.03 K/UL — SIGNIFICANT CHANGE UP (ref 0–0.5)
EOSINOPHIL NFR BLD AUTO: 0.3 % — SIGNIFICANT CHANGE UP (ref 0–6)
ESTIMATED AVERAGE GLUCOSE: 97 MG/DL — SIGNIFICANT CHANGE UP (ref 68–114)
GLUCOSE SERPL-MCNC: 114 MG/DL — HIGH (ref 70–99)
HCT VFR BLD CALC: 28.9 % — LOW (ref 34.5–45)
HGB BLD-MCNC: 9.3 G/DL — LOW (ref 11.5–15.5)
IMM GRANULOCYTES NFR BLD AUTO: 0.7 % — SIGNIFICANT CHANGE UP (ref 0–1.5)
LYMPHOCYTES # BLD AUTO: 1.26 K/UL — SIGNIFICANT CHANGE UP (ref 1–3.3)
LYMPHOCYTES # BLD AUTO: 13.3 % — SIGNIFICANT CHANGE UP (ref 13–44)
MAGNESIUM SERPL-MCNC: 1.9 MG/DL — SIGNIFICANT CHANGE UP (ref 1.6–2.6)
MCHC RBC-ENTMCNC: 28.2 PG — SIGNIFICANT CHANGE UP (ref 27–34)
MCHC RBC-ENTMCNC: 32.2 GM/DL — SIGNIFICANT CHANGE UP (ref 32–36)
MCV RBC AUTO: 87.6 FL — SIGNIFICANT CHANGE UP (ref 80–100)
MONOCYTES # BLD AUTO: 0.93 K/UL — HIGH (ref 0–0.9)
MONOCYTES NFR BLD AUTO: 9.9 % — SIGNIFICANT CHANGE UP (ref 2–14)
NEUTROPHILS # BLD AUTO: 7.14 K/UL — SIGNIFICANT CHANGE UP (ref 1.8–7.4)
NEUTROPHILS NFR BLD AUTO: 75.7 % — SIGNIFICANT CHANGE UP (ref 43–77)
NRBC # BLD: 0 /100 WBCS — SIGNIFICANT CHANGE UP (ref 0–0)
PHOSPHATE SERPL-MCNC: 2.5 MG/DL — SIGNIFICANT CHANGE UP (ref 2.5–4.5)
PLATELET # BLD AUTO: 252 K/UL — SIGNIFICANT CHANGE UP (ref 150–400)
POTASSIUM SERPL-MCNC: 3.3 MMOL/L — LOW (ref 3.5–5.3)
POTASSIUM SERPL-SCNC: 3.3 MMOL/L — LOW (ref 3.5–5.3)
PROT SERPL-MCNC: 6 G/DL — SIGNIFICANT CHANGE UP (ref 6–8.3)
RBC # BLD: 3.3 M/UL — LOW (ref 3.8–5.2)
RBC # FLD: 14.6 % — HIGH (ref 10.3–14.5)
SARS-COV-2 IGG SERPL QL IA: NEGATIVE — SIGNIFICANT CHANGE UP
SARS-COV-2 IGM SERPL IA-ACNC: 0.1 INDEX — SIGNIFICANT CHANGE UP
SODIUM SERPL-SCNC: 138 MMOL/L — SIGNIFICANT CHANGE UP (ref 135–145)
WBC # BLD: 9.44 K/UL — SIGNIFICANT CHANGE UP (ref 3.8–10.5)
WBC # FLD AUTO: 9.44 K/UL — SIGNIFICANT CHANGE UP (ref 3.8–10.5)

## 2020-08-31 RX ADMIN — Medication 650 MILLIGRAM(S): at 15:25

## 2020-08-31 RX ADMIN — AMLODIPINE BESYLATE 10 MILLIGRAM(S): 2.5 TABLET ORAL at 07:37

## 2020-08-31 RX ADMIN — Medication 40 MILLIEQUIVALENT(S): at 02:31

## 2020-08-31 RX ADMIN — Medication 4 MILLIGRAM(S): at 22:15

## 2020-08-31 RX ADMIN — SENNA PLUS 2 TABLET(S): 8.6 TABLET ORAL at 22:14

## 2020-08-31 RX ADMIN — Medication 650 MILLIGRAM(S): at 23:24

## 2020-08-31 RX ADMIN — Medication 650 MILLIGRAM(S): at 19:22

## 2020-08-31 RX ADMIN — LISINOPRIL 40 MILLIGRAM(S): 2.5 TABLET ORAL at 07:37

## 2020-08-31 RX ADMIN — CEFTRIAXONE 100 MILLIGRAM(S): 500 INJECTION, POWDER, FOR SOLUTION INTRAMUSCULAR; INTRAVENOUS at 18:10

## 2020-08-31 NOTE — PROGRESS NOTE ADULT - SUBJECTIVE AND OBJECTIVE BOX
PGY 1 Note discussed with supervising resident and primary attending  PGY-1: Nehal Covarrubias MD  PAGER #: [phone] TILL 5:00 PM  Please contact On Call team 5PM - 8:30PM  Please contact Nightfloat team 8:30PM - 7:30AM  Patient is a 87y old  Female who presents with a chief complaint of Sepsis/ Fall (30 Aug 2020 18:33)    Brief [facility] Course: 87 female with medical history of HTN and ICH (diagnosed on )  [facility]. Patient is AAO X 2 is a very poor historian. History was taken with the help of friend at bedside. Patient was recently discharged after she was diagnosed with ICH bleed to [facility] rehab and there she had a fall. No specific details about fall are known. Patients friend at bedside told us that she got a call from nursing home that patient had a fall and she is being transferred to hospital. Patient complains of abdominal pain and pain while urination associated with fever and chills.    ED: patient had a temp of 100.7F with ,  normal WBC. Patient's UA was sent and CT abdomen Pelvis and CT head was done.    Patients UA is positive for WBC 11-25 with positive nitrites and leukocyte esterase.  Patient urine and blood Cx were sent and she was given a dose of IV Zosyn .    INTERVAL HPI/OVERNIGHT EVENTS: Pt with 5 beats nonsustained VT, patient asymptomatic, asleep. On IV CTX. Pt with improved abdominal pain. Potassium replaced yesterday.     MEDICATIONS  (STANDING):  amLODIPine   Tablet 10 milliGRAM(s) Oral daily  cefTRIAXone   IVPB 1000 milliGRAM(s) IV Intermittent every 24 hours  cefTRIAXone   IVPB 1000 milliGRAM(s) IV Intermittent every 24 hours  doxazosin 4 milliGRAM(s) Oral at bedtime  lisinopril 40 milliGRAM(s) Oral daily  metoclopramide Injectable 10 milliGRAM(s) IV Push once  senna 2 Tablet(s) Oral at bedtime    MEDICATIONS  (PRN):  acetaminophen   Tablet .. 650 milliGRAM(s) Oral every 4 hours PRN Mild Pain (1 - 3)  ketorolac   Injectable 30 milliGRAM(s) IV Push every 6 hours PRN Severe Pain (7 - 10)  oxycodone    5 mG/acetaminophen 325 mG 1 Tablet(s) Oral every 4 hours PRN Moderate Pain (4 - 6)  polyethylene glycol 3350 17 Gram(s) Oral two times a day PRN Constipation      __________________________________________________  REVIEW OF SYSTEMS:  CONSTITUTIONAL: No fever, weight loss, or fatigue  RESPIRATORY: No cough, wheezing, chills or hemoptysis; No shortness of breath  CARDIOVASCULAR: No chest pain, palpitations, dizziness, or leg swelling  GASTROINTESTINAL: +abdominal pain  No nausea, vomiting, or hematemesis; No diarrhea or constipation. No melena or hematochezia.  GENITOURINARY: +dysuria No hematuria, no polyuria, no urinary hesitancy, no nocturia, normal urinary frequency  NEUROLOGICAL: No headaches, memory loss, loss of strength, numbness, or tremors  SKIN: No itching, burning, rashes, or lesions   All other ROS negative except noted above    Vital Signs Last 24 Hrs  T(C): 36 (31 Aug 2020 11:08), Max: 38.2 (30 Aug 2020 12:45)  T(F): 96.8 (31 Aug 2020 11:08), Max: 100.7 (30 Aug 2020 12:45)  HR: 92 (31 Aug 2020 11:08) (73 - 113)  BP: 152/87 (31 Aug 2020 11:08) (100/67 - 181/74)  BP(mean): --  RR: 18 (31 Aug 2020 11:08) (17 - 20)  SpO2: 95% (31 Aug 2020 11:08) (95% - 98%)    ________________________________________________  PHYSICAL EXAMINATION:  GENERAL: NAD, well-developed  HEAD:  Atraumatic, Normocephalic  EYES:  conjunctiva and sclera clear  NECK: Supple, No JVD, Normal thyroid  CHEST/LUNG: Clear to auscultation bilaterally; No rales, rhonchi, wheezing, or rubs  HEART: Regular rate and rhythm; No murmurs, rubs, or gallops  ABDOMEN: Soft, tender in lower quadrants, Nondistended; Bowel sounds present  NERVOUS SYSTEM:  Alert & Oriented X2 to person and place, did not know date,  Moving all 4 extremities  EXTREMITIES:  Peripheral pulses palpable. No clubbing, cyanosis, or edema  SKIN: Warm, Dry, no rashes or lesions    _________________________________________________  LABS:                        9.3    9.44  )-----------( 252      ( 31 Aug 2020 01:32 )             28.9     08-31    138  |  109<H>  |  17  ----------------------------<  114<H>  3.3<L>   |  23  |  0.68    Ca    7.7<L>      31 Aug 2020 01:32  Phos  2.5       Mg     1.9         TPro  6.0  /  Alb  2.5<L>  /  TBili  1.0  /  DBili  x   /  AST  24  /  ALT  20  /  AlkPhos  59  08-    PT/INR - ( 30 Aug 2020 13:05 )   PT: 12.9 sec;   INR: 1.11 ratio         PTT - ( 30 Aug 2020 13:05 )  PTT:32.4 sec  Urinalysis Basic - ( 30 Aug 2020 16:28 )    Color: Yellow / Appearance: Hazy / S.015 / pH: x  Gluc: x / Ketone: Small  / Bili: Negative / Urobili: 1   Blood: x / Protein: 30 mg/dL / Nitrite: Positive   Leuk Esterase: Moderate / RBC: >50 /HPF / WBC 11-25 /HPF   Sq Epi: x / Non Sq Epi: Few /HPF / Bacteria: Many /HPF      CAPILLARY BLOOD GLUCOSE            RADIOLOGY & ADDITIONAL TESTS:  < from: Xray Chest 2 Views PA/Lat (20 @ 13:54) >  INTERPRETATION:  Frontal and lateral chest on 2020 at 1:36 PM. Patient has sepsis.    COMPARISON: None available.    Heart is grossly normal in size.    There is a slight central vascular congestive picture.    IMPRESSION: As above.    < end of copied text >    Imaging Personally Reviewed:  YES    Consultant(s) Notes Reviewed:   YES    Care Discussed with Consultants : YES     Plan of care was discussed with patient and /or primary care giver; all questions and concerns were addressed and care was aligned with patient's wishes. PGY 1 Note discussed with supervising resident and primary attending  PGY-1: Nehal Covarrubias MD  PAGER #: [phone] TILL 5:00 PM  Please contact On Call team 5PM - 8:30PM  Please contact Nightfloat team 8:30PM - 7:30AM  Patient is a 87y old  Female who presents with a chief complaint of Sepsis/ Fall (30 Aug 2020 18:33)    Brief [facility] Course: 87 female with medical history of HTN and ICH (diagnosed on )  [facility]. Patient is AAO X 2 is a very poor historian. History was taken with the help of friend at bedside. Patient was recently discharged after she was diagnosed with ICH bleed to [facility] rehab and there she had a fall. No specific details about fall are known. Patients friend at bedside told us that she got a call from nursing home that patient had a fall and she is being transferred to hospital. Patient complains of abdominal pain and pain while urination associated with fever and chills.    ED: patient had a temp of 100.7F with ,  normal WBC. Patient's UA was sent and CT abdomen Pelvis and CT head was done.    Patients UA is positive for WBC 11-25 with positive nitrites and leukocyte esterase.  Patient urine and blood Cx were sent and she was given a dose of IV Zosyn .    INTERVAL HPI/OVERNIGHT EVENTS: Pt with 5 beats nonsustained VT, patient asymptomatic, asleep. On IV CTX. Pt with improved abdominal pain. Potassium replaced yesterday. Awaiting neurology consult.     MEDICATIONS  (STANDING):  amLODIPine   Tablet 10 milliGRAM(s) Oral daily  cefTRIAXone   IVPB 1000 milliGRAM(s) IV Intermittent every 24 hours  cefTRIAXone   IVPB 1000 milliGRAM(s) IV Intermittent every 24 hours  doxazosin 4 milliGRAM(s) Oral at bedtime  lisinopril 40 milliGRAM(s) Oral daily  metoclopramide Injectable 10 milliGRAM(s) IV Push once  senna 2 Tablet(s) Oral at bedtime    MEDICATIONS  (PRN):  acetaminophen   Tablet .. 650 milliGRAM(s) Oral every 4 hours PRN Mild Pain (1 - 3)  ketorolac   Injectable 30 milliGRAM(s) IV Push every 6 hours PRN Severe Pain (7 - 10)  oxycodone    5 mG/acetaminophen 325 mG 1 Tablet(s) Oral every 4 hours PRN Moderate Pain (4 - 6)  polyethylene glycol 3350 17 Gram(s) Oral two times a day PRN Constipation      __________________________________________________  REVIEW OF SYSTEMS:  CONSTITUTIONAL: No fever, weight loss, or fatigue  RESPIRATORY: No cough, wheezing, chills or hemoptysis; No shortness of breath  CARDIOVASCULAR: No chest pain, palpitations, dizziness, or leg swelling  GASTROINTESTINAL: +abdominal pain  No nausea, vomiting, or hematemesis; No diarrhea or constipation. No melena or hematochezia.  GENITOURINARY: +dysuria No hematuria, no polyuria, no urinary hesitancy, no nocturia, normal urinary frequency  NEUROLOGICAL: No headaches, memory loss, loss of strength, numbness, or tremors  SKIN: No itching, burning, rashes, or lesions   All other ROS negative except noted above    Vital Signs Last 24 Hrs  T(C): 36 (31 Aug 2020 11:08), Max: 38.2 (30 Aug 2020 12:45)  T(F): 96.8 (31 Aug 2020 11:08), Max: 100.7 (30 Aug 2020 12:45)  HR: 92 (31 Aug 2020 11:08) (73 - 113)  BP: 152/87 (31 Aug 2020 11:08) (100/67 - 181/74)  BP(mean): --  RR: 18 (31 Aug 2020 11:08) (17 - 20)  SpO2: 95% (31 Aug 2020 11:08) (95% - 98%)    ________________________________________________  PHYSICAL EXAMINATION:  GENERAL: NAD, well-developed  HEAD:  Atraumatic, Normocephalic  EYES:  conjunctiva and sclera clear  NECK: Supple, No JVD, Normal thyroid  CHEST/LUNG: Clear to auscultation bilaterally; No rales, rhonchi, wheezing, or rubs  HEART: Regular rate and rhythm; No murmurs, rubs, or gallops  ABDOMEN: Soft, tender in lower quadrants, Nondistended; Bowel sounds present  NERVOUS SYSTEM:  Alert & Oriented X2 to person and place, did not know date,  Moving all 4 extremities  EXTREMITIES:  Peripheral pulses palpable. No clubbing, cyanosis, or edema  SKIN: Warm, Dry, no rashes or lesions    _________________________________________________  LABS:                        9.3    9.44  )-----------( 252      ( 31 Aug 2020 01:32 )             28.9     08-    138  |  109<H>  |  17  ----------------------------<  114<H>  3.3<L>   |  23  |  0.68    Ca    7.7<L>      31 Aug 2020 01:32  Phos  2.5     -  Mg     1.9         TPro  6.0  /  Alb  2.5<L>  /  TBili  1.0  /  DBili  x   /  AST  24  /  ALT  20  /  AlkPhos  59  08-31    PT/INR - ( 30 Aug 2020 13:05 )   PT: 12.9 sec;   INR: 1.11 ratio         PTT - ( 30 Aug 2020 13:05 )  PTT:32.4 sec  Urinalysis Basic - ( 30 Aug 2020 16:28 )    Color: Yellow / Appearance: Hazy / S.015 / pH: x  Gluc: x / Ketone: Small  / Bili: Negative / Urobili: 1   Blood: x / Protein: 30 mg/dL / Nitrite: Positive   Leuk Esterase: Moderate / RBC: >50 /HPF / WBC 11-25 /HPF   Sq Epi: x / Non Sq Epi: Few /HPF / Bacteria: Many /HPF      CAPILLARY BLOOD GLUCOSE            RADIOLOGY & ADDITIONAL TESTS:  < from: Xray Chest 2 Views PA/Lat (20 @ 13:54) >  INTERPRETATION:  Frontal and lateral chest on 2020 at 1:36 PM. Patient has sepsis.    COMPARISON: None available.    Heart is grossly normal in size.    There is a slight central vascular congestive picture.    IMPRESSION: As above.    < end of copied text >    Imaging Personally Reviewed:  YES    Consultant(s) Notes Reviewed:   YES    Care Discussed with Consultants : YES     Plan of care was discussed with patient and /or primary care giver; all questions and concerns were addressed and care was aligned with patient's wishes. PGY 1 Note discussed with supervising resident and primary attending  PGY-1: Nehal Covarrubias MD  PAGER #: [phone] TILL 5:00 PM  Please contact On Call team 5PM - 8:30PM  Please contact Nightfloat team 8:30PM - 7:30AM  Patient is a 87y old  Female who presents with a chief complaint of Sepsis/ Fall (30 Aug 2020 18:33)    Brief [facility] Course: 87 female with medical history of HTN and ICH (diagnosed on )  [facility]. Patient is AAO X 2 is a very poor historian. History was taken with the help of friend at bedside. Patient was recently discharged after she was diagnosed with ICH bleed to [facility] rehab and there she had a fall. No specific details about fall are known. Patients friend at bedside told us that she got a call from nursing home that patient had a fall and she is being transferred to hospital. Patient complains of abdominal pain and pain while urination associated with fever and chills.    ED: patient had a temp of 100.7F with ,  normal WBC. Patient's UA was sent and CT abdomen Pelvis and CT head was done.    Patients UA is positive for WBC 11-25 with positive nitrites and leukocyte esterase.  Patient urine and blood Cx were sent and she was given a dose of IV Zosyn .    INTERVAL HPI/OVERNIGHT EVENTS: Pt with 5 beats nonsustained VT, patient asymptomatic, asleep. On IV CTX. Pt with improved abdominal pain. Potassium replaced yesterday. Awaiting neurology consult.     MEDICATIONS  (STANDING):  amLODIPine   Tablet 10 milliGRAM(s) Oral daily  cefTRIAXone   IVPB 1000 milliGRAM(s) IV Intermittent every 24 hours  cefTRIAXone   IVPB 1000 milliGRAM(s) IV Intermittent every 24 hours  doxazosin 4 milliGRAM(s) Oral at bedtime  lisinopril 40 milliGRAM(s) Oral daily  metoclopramide Injectable 10 milliGRAM(s) IV Push once  senna 2 Tablet(s) Oral at bedtime    MEDICATIONS  (PRN):  acetaminophen   Tablet .. 650 milliGRAM(s) Oral every 4 hours PRN Mild Pain (1 - 3)  ketorolac   Injectable 30 milliGRAM(s) IV Push every 6 hours PRN Severe Pain (7 - 10)  oxycodone    5 mG/acetaminophen 325 mG 1 Tablet(s) Oral every 4 hours PRN Moderate Pain (4 - 6)  polyethylene glycol 3350 17 Gram(s) Oral two times a day PRN Constipation      __________________________________________________  REVIEW OF SYSTEMS:  CONSTITUTIONAL: No fever, weight loss, or fatigue  RESPIRATORY: No cough, wheezing, chills or hemoptysis; No shortness of breath  CARDIOVASCULAR: No chest pain, palpitations, dizziness, or leg swelling  GASTROINTESTINAL: +abdominal pain  No nausea, vomiting, or hematemesis; No diarrhea or constipation. No melena or hematochezia.  GENITOURINARY: +dysuria No hematuria, no polyuria, no urinary hesitancy, no nocturia, normal urinary frequency  NEUROLOGICAL: No headaches, memory loss, loss of strength, numbness, or tremors  SKIN: No itching, burning, rashes, or lesions   All other ROS negative except noted above    Vital Signs Last 24 Hrs  T(C): 36 (31 Aug 2020 11:08), Max: 38.2 (30 Aug 2020 12:45)  T(F): 96.8 (31 Aug 2020 11:08), Max: 100.7 (30 Aug 2020 12:45)  HR: 92 (31 Aug 2020 11:08) (73 - 113)  BP: 152/87 (31 Aug 2020 11:08) (100/67 - 181/74)  RR: 18 (31 Aug 2020 11:08) (17 - 20)  SpO2: 95% (31 Aug 2020 11:08) (95% - 98%)    ________________________________________________  PHYSICAL EXAMINATION:  GENERAL: NAD, well-developed  HEAD:  Atraumatic, Normocephalic  EYES:  conjunctiva and sclera clear  NECK: Supple, No JVD, Normal thyroid  CHEST/LUNG: Clear to auscultation bilaterally; No rales, rhonchi, wheezing, or rubs  HEART: Regular rate and rhythm; No murmurs, rubs, or gallops  ABDOMEN: Soft, tender in lower quadrants, Nondistended; Bowel sounds present  NERVOUS SYSTEM:  Alert & Oriented X2 to person and place, did not know date,  Moving all 4 extremities  EXTREMITIES:  Peripheral pulses palpable. No clubbing, cyanosis, or edema  SKIN: Warm, Dry, no rashes or lesions    _________________________________________________  LABS:                        9.3    9.44  )-----------( 252      ( 31 Aug 2020 01:32 )             28.9     08-31    138  |  109<H>  |  17  ----------------------------<  114<H>  3.3<L>   |  23  |  0.68    Ca    7.7<L>      31 Aug 2020 01:32  Phos  2.5       Mg     1.9         TPro  6.0  /  Alb  2.5<L>  /  TBili  1.0  /  DBili  x   /  AST  24  /  ALT  20  /  AlkPhos  59  08-    PT/INR - ( 30 Aug 2020 13:05 )   PT: 12.9 sec;   INR: 1.11 ratio         PTT - ( 30 Aug 2020 13:05 )  PTT:32.4 sec  Urinalysis Basic - ( 30 Aug 2020 16:28 )    Color: Yellow / Appearance: Hazy / S.015 / pH: x  Gluc: x / Ketone: Small  / Bili: Negative / Urobili: 1   Blood: x / Protein: 30 mg/dL / Nitrite: Positive   Leuk Esterase: Moderate / RBC: >50 /HPF / WBC 11-25 /HPF   Sq Epi: x / Non Sq Epi: Few /HPF / Bacteria: Many /HPF      CAPILLARY BLOOD GLUCOSE            RADIOLOGY & ADDITIONAL TESTS:  < from: Xray Chest 2 Views PA/Lat (20 @ 13:54) >  INTERPRETATION:  Frontal and lateral chest on 2020 at 1:36 PM. Patient has sepsis.    COMPARISON: None available.    Heart is grossly normal in size.    There is a slight central vascular congestive picture.    IMPRESSION: As above.    < end of copied text >    Imaging Personally Reviewed:  YES    Consultant(s) Notes Reviewed:   YES    Care Discussed with Consultants : YES     Plan of care was discussed with patient and /or primary care giver; all questions and concerns were addressed and care was aligned with patient's wishes.

## 2020-08-31 NOTE — PROGRESS NOTE ADULT - PROBLEM SELECTOR PLAN 6
RISK                                                          Points  [] Previous VTE                                           3  [] Thrombophilia                                        2  [] Lower limb paralysis                              2   [] Current Cancer                                       2   [x] Immobilization > 24 hrs                        1  [] ICU/CCU stay > 24 hours                       1  [x] Age > 60                                                   1    holding off chemo prophylaxis for DVT for recent ICH, jordan continue with SCDs  GI ppx: Not indicated/Protonix  Diet: Soft  Electrolytes replaced PRN  Dispo: KYE  DNR/DNI - no MOSLT

## 2020-08-31 NOTE — PROGRESS NOTE ADULT - ATTENDING COMMENTS
Patient seen and examined. Patient's history, vitals, labs, imaging studies reviewed. Discussed with above resident, agree with note with edits and educated on the diagnosis and management of above medical conditions. Plan of care discussed with patient, and agrees, all questions answered.   Lashawn Howard MD  8/31/2020

## 2020-08-31 NOTE — PROGRESS NOTE ADULT - ASSESSMENT
87 female with medical history of HTN and ICH (diagnosed on 23rd August)  was sent in from [facility] rehab after a fall. Patient is AAO X 2 is a very poor historian. History was taken with the help of friend at bed side. Patient was recently discharged after she was diagnosed with ICH bleed to [facility] rehab and there she had a fall. No specific details about fall are known Patients friend at bed side told us that she got a call from nursing home that patient had a fall and she is being transferred to hospital. Patient complains of abdominal pain and pain while urination associated with fever and chills.    Patient is admitted for Sepsis secondary to UTI.

## 2020-08-31 NOTE — PROGRESS NOTE ADULT - PROBLEM SELECTOR PLAN 2
patient had a temp of 100.7F with   normal WBC. patients UA was sent and CT abdomen Pelvis and CT head was done - no significant findings, CT head stable    Patients UA is positive for WBC 11-25 with positive nitrites and leukocyte esterase.  Patient urine and blood Cx were sent and she was given a dose of Zosyn  will start the patient on IV Rocephin  CT chest and abdomen -ve for any acute changes or any signs of infection.  Patient had elevated lactate , trended down to normal.  F/u blood and urine Cx

## 2020-08-31 NOTE — CONSULT NOTE ADULT - SUBJECTIVE AND OBJECTIVE BOX
HISTORY OF PRESENT ILLNESS: HPI:  87 female with medical history of HTN and ICH (diagnosed on 23rd August)  was sent in from [facility] rehab after a fall. Patient is AAO X 2 is a very poor historian. History was taken with the help of friend at bedside. Patient was recently discharged after she was diagnosed with ICH bleed to [facility] rehab and there she had a fall. No specific details about fall are known. Patients friend at bedside told us that she got a call from nursing home that patient had a fall and she is being transferred to hospital. Patient complains of abdominal pain and pain while urination associated with fever and chills. No CP, No palpitations and no LOC    ED: patient had a temp of 100.7F with ,  normal WBC. Patient's UA was sent and CT abdomen Pelvis and CT head was done.    Patients UA is positive for WBC 11-25 with positive nitrites and leukocyte esterase.  Patient urine and blood Cx were sent and she was given a dose of IV Zosyn . (30 Aug 2020 18:33)      PAST MEDICAL & SURGICAL HISTORY:  Breast cancer: right s/p resection  Essential hypertension  Breast CA  HTN (hypertension)  No significant past surgical history  H/O mastectomy  History of colon resection          MEDICATIONS:  MEDICATIONS  (STANDING):  amLODIPine   Tablet 10 milliGRAM(s) Oral daily  cefTRIAXone   IVPB 1000 milliGRAM(s) IV Intermittent every 24 hours  cefTRIAXone   IVPB 1000 milliGRAM(s) IV Intermittent every 24 hours  doxazosin 4 milliGRAM(s) Oral at bedtime  lisinopril 40 milliGRAM(s) Oral daily  metoclopramide Injectable 10 milliGRAM(s) IV Push once  senna 2 Tablet(s) Oral at bedtime      Allergies    No Known Allergies    Intolerances        FAMILY HISTORY:  No pertinent family history in first degree relatives    Non-contributary for premature coronary disease or sudden cardiac death    SOCIAL HISTORY:    [ X] Non-smoker  [ ] Smoker  [ ] Alcohol      REVIEW OF SYSTEMS:  [ ]chest pain  [  ]shortness of breath  [  ]palpitations  [  ]syncope  [ ]near syncope [ ]upper extremity weakness   [ ] lower extremity weakness  [  ]diplopia  [  ]altered mental status   [  ]fevers  [ ]chills [ ]nausea  [ ]vomitting  [  ]dysphagia    [ ]abdominal pain  [ ]melena  [ ]BRBPR    [  ]epistaxis  [  ]rash    [ ]lower extremity edema        [ ] All others negative	  [ ] Unable to obtain      LABS:	 	    CARDIAC MARKERS:  CARDIAC MARKERS ( 30 Aug 2020 13:05 )  <0.015 ng/mL / x     / x     / x     / x                                  9.3    9.44  )-----------( 252      ( 31 Aug 2020 01:32 )             28.9     Hb Trend: 9.3<--    08-31    138  |  109<H>  |  17  ----------------------------<  114<H>  3.3<L>   |  23  |  0.68    Ca    7.7<L>      31 Aug 2020 01:32  Phos  2.5     08-31  Mg     1.9     08-31    TPro  6.0  /  Alb  2.5<L>  /  TBili  1.0  /  DBili  x   /  AST  24  /  ALT  20  /  AlkPhos  59  08-31    Creatinine Trend: 0.68<--, 0.86<--, 0.78<--, 0.72<--, 0.69<--, 0.80<--        PHYSICAL EXAM:  T(C): 36.9 (08-31-20 @ 15:46), Max: 38.3 (08-31-20 @ 15:13)  HR: 62 (08-31-20 @ 15:46) (62 - 113)  BP: 174/96 (08-31-20 @ 15:46) (137/59 - 174/96)  RR: 20 (08-31-20 @ 15:46) (17 - 20)  SpO2: 98% (08-31-20 @ 15:46) (95% - 98%)  Wt(kg): --   BMI (kg/m2): 23 (08-30-20 @ 12:06)  I&O's Summary      Gen: Appears well in NAD  HEENT:  (-)icterus (-)pallor  CV: N S1 S2 1/6 BETHANY (+)2 Pulses B/l  Resp:  Clear to ausculatation B/L, normal effort  GI: (+) BS Soft, NT, ND  Lymph:  (-)Edema, (-)obvious lymphadenopathy  Skin: Warm to touch, Normal turgor  Psych: Appropriate mood and affect        TELEMETRY: 	  Sinus, PVC, COuplet 5 beats of NSVT    ECG:  	sinus tachycardia 112 BPM nonspecific T wave abnormalities    RADIOLOGY:         CXR:  < from: Xray Chest 2 Views PA/Lat (08.30.20 @ 13:54) >  Heart is grossly normal in size.    There is a slight central vascular congestive picture.          ASSESSMENT/PLAN: 	87y Female  medical history of HTN and ICH (diagnosed on 23rd August)  was sent in from [facility] rehab after a fall urosepsis preserved LV function incidentally noted with NSVT.    - WIll deffer ischemic eval given recent ICH and DNR / DNI status  - BP stable start Lopressor 25 mg PO BID  - Abx per medical team  - will follow with you    I once again thank you for allowing me to participate in the care of your patient.  If you have any questions or concerns please do not hesitate to contact me.    Rafiq Brush MD, [facility]  BEEPER [phone]

## 2020-08-31 NOTE — PROGRESS NOTE ADULT - PROBLEM SELECTOR PLAN 4
Patient is on lisinopril and amlodipine at home .  Cw with home meds.  Goal systolic  blood pressure < 140

## 2020-08-31 NOTE — PROGRESS NOTE ADULT - PROBLEM SELECTOR PLAN 1
Patients UA is positive for WBC 11-25 with positive nitrites and leukocyte esterase.  Patient urine cx sent  will start the patient on IV Rocephin

## 2020-08-31 NOTE — PROGRESS NOTE ADULT - PROBLEM SELECTOR PLAN 3
Patient had a fall at rehab.  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 Continued correlation and follow-up is recommended.  Echo done on August 24, 2020 normal systolic function .  Will monitor the patient on tele.  Fall precautions Patient had a fall at rehab.  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 Continued correlation and follow-up is recommended.  Echo done on August 24, 2020 normal systolic function .  Will monitor the patient on tele.  5 beats NSVT this AM, pt asymptomatic  Fall precautions  Cardiology Dr. Brush

## 2020-08-31 NOTE — PROGRESS NOTE ADULT - PROBLEM SELECTOR PLAN 5
Patient has hx of ICH.  CT head showed CT head was done it is showing stable Intra cranial bleed compared to previous admission  Can consider MRI head  Goal Bp < 140  F/u neurology consult Patient has hx of ICH.  CT head showed CT head was done it is showing stable Intra cranial bleed compared to previous admission  Can consider MRI head  Goal Bp < 140  F/u neurology consult Dr. Linares

## 2020-09-01 ENCOUNTER — TRANSCRIPTION ENCOUNTER (OUTPATIENT)
Age: 85
End: 2020-09-01

## 2020-09-01 LAB
-  CANDIDA ALBICANS: SIGNIFICANT CHANGE UP
-  CANDIDA GLABRATA: SIGNIFICANT CHANGE UP
-  CANDIDA KRUSEI: SIGNIFICANT CHANGE UP
-  CANDIDA PARAPSILOSIS: SIGNIFICANT CHANGE UP
-  CANDIDA TROPICALIS: SIGNIFICANT CHANGE UP
-  COAGULASE NEGATIVE STAPHYLOCOCCUS: SIGNIFICANT CHANGE UP
-  K. PNEUMONIAE GROUP: SIGNIFICANT CHANGE UP
-  KPC RESISTANCE GENE: SIGNIFICANT CHANGE UP
-  STREPTOCOCCUS SP. (NOT GRP A, B OR S PNEUMONIAE): SIGNIFICANT CHANGE UP
A BAUMANNII DNA SPEC QL NAA+PROBE: SIGNIFICANT CHANGE UP
ANION GAP SERPL CALC-SCNC: 8 MMOL/L — SIGNIFICANT CHANGE UP (ref 5–17)
APPEARANCE UR: CLEAR — SIGNIFICANT CHANGE UP
BACTERIA # UR AUTO: ABNORMAL /HPF
BILIRUB UR-MCNC: NEGATIVE — SIGNIFICANT CHANGE UP
BUN SERPL-MCNC: 15 MG/DL — SIGNIFICANT CHANGE UP (ref 7–18)
CALCIUM SERPL-MCNC: 8.4 MG/DL — SIGNIFICANT CHANGE UP (ref 8.4–10.5)
CHLORIDE SERPL-SCNC: 103 MMOL/L — SIGNIFICANT CHANGE UP (ref 96–108)
CO2 SERPL-SCNC: 25 MMOL/L — SIGNIFICANT CHANGE UP (ref 22–31)
COLOR SPEC: YELLOW — SIGNIFICANT CHANGE UP
CREAT SERPL-MCNC: 0.61 MG/DL — SIGNIFICANT CHANGE UP (ref 0.5–1.3)
CULTURE RESULTS: SIGNIFICANT CHANGE UP
DIFF PNL FLD: ABNORMAL
E CLOAC COMP DNA BLD POS QL NAA+PROBE: SIGNIFICANT CHANGE UP
E COLI DNA BLD POS QL NAA+NON-PROBE: SIGNIFICANT CHANGE UP
ENTEROCOC DNA BLD POS QL NAA+NON-PROBE: SIGNIFICANT CHANGE UP
ENTEROCOC DNA BLD POS QL NAA+NON-PROBE: SIGNIFICANT CHANGE UP
EPI CELLS # UR: SIGNIFICANT CHANGE UP /HPF
FERRITIN SERPL-MCNC: 180 NG/ML — HIGH (ref 15–150)
FOLATE SERPL-MCNC: 10.8 NG/ML — SIGNIFICANT CHANGE UP
GLUCOSE SERPL-MCNC: 90 MG/DL — SIGNIFICANT CHANGE UP (ref 70–99)
GLUCOSE UR QL: NEGATIVE — SIGNIFICANT CHANGE UP
GP B STREP DNA BLD POS QL NAA+NON-PROBE: SIGNIFICANT CHANGE UP
GRAM STN FLD: SIGNIFICANT CHANGE UP
HAEM INFLU DNA BLD POS QL NAA+NON-PROBE: SIGNIFICANT CHANGE UP
HCT VFR BLD CALC: 32.5 % — LOW (ref 34.5–45)
HGB BLD-MCNC: 10.5 G/DL — LOW (ref 11.5–15.5)
IRON SATN MFR SERPL: 17 % — SIGNIFICANT CHANGE UP (ref 15–50)
IRON SATN MFR SERPL: 41 UG/DL — SIGNIFICANT CHANGE UP (ref 40–150)
K OXYTOCA DNA BLD POS QL NAA+NON-PROBE: SIGNIFICANT CHANGE UP
KETONES UR-MCNC: NEGATIVE — SIGNIFICANT CHANGE UP
L MONOCYTOG DNA BLD POS QL NAA+NON-PROBE: SIGNIFICANT CHANGE UP
LEUKOCYTE ESTERASE UR-ACNC: ABNORMAL
MAGNESIUM SERPL-MCNC: 2.2 MG/DL — SIGNIFICANT CHANGE UP (ref 1.6–2.6)
MCHC RBC-ENTMCNC: 28.1 PG — SIGNIFICANT CHANGE UP (ref 27–34)
MCHC RBC-ENTMCNC: 32.3 GM/DL — SIGNIFICANT CHANGE UP (ref 32–36)
MCV RBC AUTO: 86.9 FL — SIGNIFICANT CHANGE UP (ref 80–100)
METHOD TYPE: SIGNIFICANT CHANGE UP
MRSA SPEC QL CULT: SIGNIFICANT CHANGE UP
MSSA DNA SPEC QL NAA+PROBE: SIGNIFICANT CHANGE UP
N MEN ISLT CULT: SIGNIFICANT CHANGE UP
NITRITE UR-MCNC: NEGATIVE — SIGNIFICANT CHANGE UP
NRBC # BLD: 0 /100 WBCS — SIGNIFICANT CHANGE UP (ref 0–0)
P AERUGINOSA DNA BLD POS NAA+NON-PROBE: SIGNIFICANT CHANGE UP
PH UR: 6 — SIGNIFICANT CHANGE UP (ref 5–8)
PHOSPHATE SERPL-MCNC: 2.7 MG/DL — SIGNIFICANT CHANGE UP (ref 2.5–4.5)
PLATELET # BLD AUTO: 286 K/UL — SIGNIFICANT CHANGE UP (ref 150–400)
POTASSIUM SERPL-MCNC: 2.9 MMOL/L — CRITICAL LOW (ref 3.5–5.3)
POTASSIUM SERPL-SCNC: 2.9 MMOL/L — CRITICAL LOW (ref 3.5–5.3)
PROT UR-MCNC: NEGATIVE — SIGNIFICANT CHANGE UP
RBC # BLD: 3.74 M/UL — LOW (ref 3.8–5.2)
RBC # FLD: 14.6 % — HIGH (ref 10.3–14.5)
RBC CASTS # UR COMP ASSIST: ABNORMAL /HPF (ref 0–2)
S MARCESCENS DNA BLD POS NAA+NON-PROBE: SIGNIFICANT CHANGE UP
S PNEUM DNA BLD POS QL NAA+NON-PROBE: SIGNIFICANT CHANGE UP
S PYO DNA BLD POS QL NAA+NON-PROBE: SIGNIFICANT CHANGE UP
SODIUM SERPL-SCNC: 136 MMOL/L — SIGNIFICANT CHANGE UP (ref 135–145)
SP GR SPEC: 1 — LOW (ref 1.01–1.02)
SPECIMEN SOURCE: SIGNIFICANT CHANGE UP
TIBC SERPL-MCNC: 236 UG/DL — LOW (ref 250–450)
TRANSFERRIN SERPL-MCNC: 194 MG/DL — LOW (ref 200–360)
UIBC SERPL-MCNC: 195 UG/DL — SIGNIFICANT CHANGE UP (ref 110–370)
UROBILINOGEN FLD QL: NEGATIVE — SIGNIFICANT CHANGE UP
VIT B12 SERPL-MCNC: 1913 PG/ML — HIGH (ref 232–1245)
WBC # BLD: 8.33 K/UL — SIGNIFICANT CHANGE UP (ref 3.8–10.5)
WBC # FLD AUTO: 8.33 K/UL — SIGNIFICANT CHANGE UP (ref 3.8–10.5)
WBC UR QL: ABNORMAL /HPF (ref 0–5)

## 2020-09-01 PROCEDURE — 71045 X-RAY EXAM CHEST 1 VIEW: CPT | Mod: 26

## 2020-09-01 PROCEDURE — 99223 1ST HOSP IP/OBS HIGH 75: CPT

## 2020-09-01 PROCEDURE — 70450 CT HEAD/BRAIN W/O DYE: CPT | Mod: 26

## 2020-09-01 RX ORDER — POTASSIUM CHLORIDE 20 MEQ
10 PACKET (EA) ORAL
Refills: 0 | Status: COMPLETED | OUTPATIENT
Start: 2020-09-01 | End: 2020-09-01

## 2020-09-01 RX ORDER — METOCLOPRAMIDE HCL 10 MG
10 TABLET ORAL EVERY 8 HOURS
Refills: 0 | Status: DISCONTINUED | OUTPATIENT
Start: 2020-09-01 | End: 2020-09-04

## 2020-09-01 RX ORDER — DIPHENHYDRAMINE HCL 50 MG
25 CAPSULE ORAL EVERY 8 HOURS
Refills: 0 | Status: DISCONTINUED | OUTPATIENT
Start: 2020-09-01 | End: 2020-09-04

## 2020-09-01 RX ORDER — VALPROIC ACID (AS SODIUM SALT) 250 MG/5ML
500 SOLUTION, ORAL ORAL EVERY 8 HOURS
Refills: 0 | Status: DISCONTINUED | OUTPATIENT
Start: 2020-09-01 | End: 2020-09-04

## 2020-09-01 RX ORDER — METOPROLOL TARTRATE 50 MG
25 TABLET ORAL
Refills: 0 | Status: DISCONTINUED | OUTPATIENT
Start: 2020-09-01 | End: 2020-09-03

## 2020-09-01 RX ORDER — SODIUM CHLORIDE 9 MG/ML
1000 INJECTION INTRAMUSCULAR; INTRAVENOUS; SUBCUTANEOUS ONCE
Refills: 0 | Status: COMPLETED | OUTPATIENT
Start: 2020-09-01 | End: 2020-09-01

## 2020-09-01 RX ADMIN — Medication 10 MILLIGRAM(S): at 21:27

## 2020-09-01 RX ADMIN — LISINOPRIL 40 MILLIGRAM(S): 2.5 TABLET ORAL at 05:07

## 2020-09-01 RX ADMIN — Medication 30 MILLIGRAM(S): at 18:18

## 2020-09-01 RX ADMIN — AMLODIPINE BESYLATE 10 MILLIGRAM(S): 2.5 TABLET ORAL at 05:07

## 2020-09-01 RX ADMIN — Medication 100 MILLIEQUIVALENT(S): at 08:25

## 2020-09-01 RX ADMIN — Medication 30 MILLIGRAM(S): at 16:42

## 2020-09-01 RX ADMIN — OXYCODONE AND ACETAMINOPHEN 1 TABLET(S): 5; 325 TABLET ORAL at 21:26

## 2020-09-01 RX ADMIN — Medication 100 MILLIEQUIVALENT(S): at 11:50

## 2020-09-01 RX ADMIN — OXYCODONE AND ACETAMINOPHEN 1 TABLET(S): 5; 325 TABLET ORAL at 13:53

## 2020-09-01 RX ADMIN — Medication 25 MILLIGRAM(S): at 21:19

## 2020-09-01 RX ADMIN — Medication 25 MILLIGRAM(S): at 18:23

## 2020-09-01 RX ADMIN — OXYCODONE AND ACETAMINOPHEN 1 TABLET(S): 5; 325 TABLET ORAL at 10:30

## 2020-09-01 RX ADMIN — SENNA PLUS 2 TABLET(S): 8.6 TABLET ORAL at 21:21

## 2020-09-01 RX ADMIN — OXYCODONE AND ACETAMINOPHEN 1 TABLET(S): 5; 325 TABLET ORAL at 08:28

## 2020-09-01 RX ADMIN — Medication 4 MILLIGRAM(S): at 21:19

## 2020-09-01 RX ADMIN — Medication 650 MILLIGRAM(S): at 00:33

## 2020-09-01 RX ADMIN — OXYCODONE AND ACETAMINOPHEN 1 TABLET(S): 5; 325 TABLET ORAL at 22:30

## 2020-09-01 RX ADMIN — Medication 27.5 MILLIGRAM(S): at 21:20

## 2020-09-01 RX ADMIN — OXYCODONE AND ACETAMINOPHEN 1 TABLET(S): 5; 325 TABLET ORAL at 15:43

## 2020-09-01 RX ADMIN — Medication 100 MILLIEQUIVALENT(S): at 10:18

## 2020-09-01 NOTE — DISCHARGE NOTE PROVIDER - NSDCCPCAREPLAN_GEN_ALL_CORE_FT
PRINCIPAL DISCHARGE DIAGNOSIS  Diagnosis: Sepsis  Assessment and Plan of Treatment: You had sepsis due to urinary tarct infection. You were treated with iv antibiotics. Urine culture was negative. Blood culture was negative. Take medications as prescribed. Follow with your PCP.      SECONDARY DISCHARGE DIAGNOSES  Diagnosis: HTN (hypertension)  Assessment and Plan of Treatment: Continue with blood pressure medication. Maintain a healthy diet that consist of low sugar, low fat, low sodium diet. Exercise frequently if possible.  Follow up with primary care physician in one week after discharge.    Diagnosis: Fall  Assessment and Plan of Treatment: You had a fall before coming to the hospital. CT Head was negative. Echocardiogram in July was normal. PRINCIPAL DISCHARGE DIAGNOSIS  Diagnosis: Sepsis  Assessment and Plan of Treatment: You had sepsis due to urinary tarct infection. You were treated with iv antibiotics. Urine culture was negative. Blood culture was negative. We repeated your cultures and so far they have been negative. You are no longer on antibiotics.      SECONDARY DISCHARGE DIAGNOSES  Diagnosis: Fall  Assessment and Plan of Treatment: You had a fall before coming to the hospital. You have known intracranial hemorrhage, repeat CT on admission showed no change in your brain bleed areas. You are started on pain medications to help you with pain and headache. Please avoid NSAID's as they can increase the risk of bleeding.    Diagnosis: HTN (hypertension)  Assessment and Plan of Treatment: You are advised to continue with your BP medications at home and monitor your BP closely. Good blood pressure control is very important as high blood pressure can cause repeat episode of bleeding. PRINCIPAL DISCHARGE DIAGNOSIS  Diagnosis: Intracranial bleed  Assessment and Plan of Treatment: / Toxic metabolic encephalopathy - You had a fall before coming to the hospital. You have known intracranial hemorrhage, repeat CT on admission showed no change in your brain bleed areas. You are started on pain medications to help you with pain and headache. Please avoid NSAID's as they can increase the risk of bleeding.      SECONDARY DISCHARGE DIAGNOSES  Diagnosis: Fall  Assessment and Plan of Treatment: You had a fall before coming to the hospital. You have known intracranial hemorrhage, repeat CT on admission showed no change in your brain bleed areas. You are started on pain medications to help you with pain and headache.    Diagnosis: Sepsis  Assessment and Plan of Treatment: You had sepsis due to urinary tarct infection. You were treated with iv antibiotics. Urine culture was negative. Blood culture was negative. We repeated your cultures and so far they have been negative. You are no longer on antibiotics.    Diagnosis: Hypokalemia  Assessment and Plan of Treatment: corrected    Diagnosis: HTN (hypertension)  Assessment and Plan of Treatment: You are advised to continue with your BP medications at home and monitor your BP closely. Good blood pressure control is very important as high blood pressure can cause repeat episode of bleeding. PRINCIPAL DISCHARGE DIAGNOSIS  Diagnosis: Intracranial bleed  Assessment and Plan of Treatment: / Toxic metabolic encephalopathy - You had a fall before coming to the hospital. You have known intracranial hemorrhage, repeat CT on admission showed no change in your brain bleed areas. You are started on pain medications to help you with pain and headache. Please avoid NSAID's as they can increase the risk of bleeding.      SECONDARY DISCHARGE DIAGNOSES  Diagnosis: NSVT (nonsustained ventricular tachycardia)  Assessment and Plan of Treatment: seen by cardiologist, lopressor dose adjusted    Diagnosis: Fall  Assessment and Plan of Treatment: You had a fall before coming to the hospital. You have known intracranial hemorrhage, repeat CT on admission showed no change in your brain bleed areas. You are started on pain medications to help you with pain and headache.    Diagnosis: Sepsis  Assessment and Plan of Treatment: You had sepsis due to urinary tarct infection. You were treated with iv antibiotics. Urine culture was negative. Blood culture was negative. We repeated your cultures and so far they have been negative. You are no longer on antibiotics.    Diagnosis: Hypokalemia  Assessment and Plan of Treatment: corrected    Diagnosis: HTN (hypertension)  Assessment and Plan of Treatment: You are advised to continue with your BP medications at home and monitor your BP closely. Good blood pressure control is very important as high blood pressure can cause repeat episode of bleeding. PRINCIPAL DISCHARGE DIAGNOSIS  Diagnosis: Intracranial bleed  Assessment and Plan of Treatment: / Toxic metabolic encephalopathy - You had a fall before coming to the hospital. You have known intracranial hemorrhage, repeat CT on admission showed no change in your brain bleed areas. You are started on pain medications to help you with pain and headache. Please avoid NSAID's as they can increase the risk of bleeding. You were also seen by neurology, repeat CT is stable, and neurology recommended to give the following cocktail (ALL 3 given together Q8h) : Valproate 500mg IV q 8h + Benadryl 25mg PO Q8h + Reglan 10mg IV Q8h         SECONDARY DISCHARGE DIAGNOSES  Diagnosis: NSVT (nonsustained ventricular tachycardia)  Assessment and Plan of Treatment: seen by cardiologist, lopressor dose adjusted    Diagnosis: Fall  Assessment and Plan of Treatment: You had a fall before coming to the hospital. You have known intracranial hemorrhage, repeat CT on admission showed no change in your brain bleed areas. You are started on pain medications to help you with pain and headache.    Diagnosis: Sepsis  Assessment and Plan of Treatment: You had sepsis due to urinary tarct infection. You were treated with iv antibiotics. Urine culture was negative. Blood culture was negative. We repeated your cultures and so far they have been negative. You are no longer on antibiotics.    Diagnosis: Hypokalemia  Assessment and Plan of Treatment: corrected    Diagnosis: HTN (hypertension)  Assessment and Plan of Treatment: You are advised to continue with your BP medications at home and monitor your BP closely. Good blood pressure control is very important as high blood pressure can cause repeat episode of bleeding. PRINCIPAL DISCHARGE DIAGNOSIS  Diagnosis: Intracranial bleed  Assessment and Plan of Treatment: Toxic metabolic encephalopathy - You had a fall before coming to the hospital. You have known intracranial hemorrhage, repeat CT on admission showed no change in your brain bleed areas. You are started on pain medications to help you with pain and headache. Please avoid NSAID's as they can increase the risk of bleeding. You were also seen by neurology, repeat CT is stable, and neurology recommended to give the following cocktail (ALL 3 given together Q8h) : Valproate 500mg IV q 8h + Benadryl 25mg PO Q8h + Reglan 10mg IV Q8h. You recieved IV regimen, you you are recommended to continue DEPAKOTE 1 TAB THREE TIMES DAILY FOR 5 DAYS FOLLOWED BY 1 TAB TWICE DAILY FOR 5 DAYS FOLLOWED BY 1 TAB FOR 5 DAYS AND THEN STOP. YOU ARE ALSO ADVISED TO YOUR REGLAN 10MG EVERY 6 HOURS AS NEEDED FOR TEN DAYS and then stop. Please follow up with Dr Humphrey Linares and DR Justine Rain within 2 weeks of your discharge.         SECONDARY DISCHARGE DIAGNOSES  Diagnosis: NSVT (nonsustained ventricular tachycardia)  Assessment and Plan of Treatment: You were found to have some episodes of tachycardia on your admission, you were seen by cardiologist and no further intervention was recommended.    Diagnosis: Hypokalemia  Assessment and Plan of Treatment: You have hypokalemia on admission that has now improved, your were given electrolyte replacement and your numbers have now improved. You are advised to follow up with your pcp in 2 weeks of your discharge.    Diagnosis: Sepsis  Assessment and Plan of Treatment: You had sepsis due to urinary tarct infection. You were treated with iv antibiotics. Urine culture was negative. Blood culture was negative. We repeated your cultures and so far they have been negative. You are no longer on antibiotics.    Diagnosis: Fall  Assessment and Plan of Treatment: You had a fall before coming to the hospital. You have known intracranial hemorrhage, repeat CT on admission showed no change in your brain bleed areas. You are started on pain medications to help you with pain and headache.    Diagnosis: HTN (hypertension)  Assessment and Plan of Treatment: You are advised to continue with your BP medications at home and monitor your BP closely. Good blood pressure control is very important as high blood pressure can cause repeat episode of bleeding.

## 2020-09-01 NOTE — PROGRESS NOTE ADULT - SUBJECTIVE AND OBJECTIVE BOX
Patient denies chest pain or shortness of breath.   Review of systems otherwise (-)  	  MEDICATIONS:  MEDICATIONS  (STANDING):  amLODIPine   Tablet 10 milliGRAM(s) Oral daily  doxazosin 4 milliGRAM(s) Oral at bedtime  lisinopril 40 milliGRAM(s) Oral daily  metoclopramide Injectable 10 milliGRAM(s) IV Push once  metoprolol tartrate 25 milliGRAM(s) Oral two times a day  piperacillin/tazobactam IVPB.. 3.375 Gram(s) IV Intermittent every 8 hours  senna 2 Tablet(s) Oral at bedtime  vancomycin  IVPB 1000 milliGRAM(s) IV Intermittent every 12 hours      LABS:	 	    CARDIAC MARKERS:  CARDIAC MARKERS ( 30 Aug 2020 13:05 )  <0.015 ng/mL / x     / x     / x     / x                                    10.5   8.33  )-----------( 286      ( 01 Sep 2020 06:43 )             32.5     Hemoglobin: 10.5 g/dL (09-01 @ 06:43)  Hemoglobin: 9.3 g/dL (08-31 @ 01:32)  Hemoglobin: 11.7 g/dL (08-30 @ 13:05)      09-01    136  |  103  |  15  ----------------------------<  90  2.9<LL>   |  25  |  0.61    Ca    8.4      01 Sep 2020 06:43  Phos  2.7     09-01  Mg     2.2     09-01    TPro  6.0  /  Alb  2.5<L>  /  TBili  1.0  /  DBili  x   /  AST  24  /  ALT  20  /  AlkPhos  59  08-31    Creatinine Trend: 0.61<--, 0.68<--, 0.86<--, 0.78<--, 0.72<--, 0.69<--        PHYSICAL EXAM:  T(C): 36.6 (09-01-20 @ 15:58), Max: 37.2 (08-31-20 @ 19:45)  HR: 87 (09-01-20 @ 15:58) (85 - 107)  BP: 164/61 (09-01-20 @ 15:58) (98/80 - 164/61)  RR: 16 (09-01-20 @ 15:58) (16 - 18)  SpO2: 99% (09-01-20 @ 15:58) (96% - 100%)  Wt(kg): --  I&O's Summary    HEENT:  (-)icterus (-)pallor  CV: N S1 S2 1/6 BETHANY (+)2 Pulses B/l  Resp:  Clear to ausculatation B/L, normal effort  GI: (+) BS Soft, NT, ND  Lymph:  (-)Edema, (-)obvious lymphadenopathy  Skin: Warm to touch, Normal turgor  Psych: Appropriate mood and affect      TELEMETRY: 	  PVC, couplet sinus        ASSESSMENT/PLAN: 	87y  Female medical history of HTN and ICH (diagnosed on 23rd August)  was sent in from [facility] rehab after a fall urosepsis preserved LV function incidentally noted with NSVT.    - WIll deffer ischemic eval given recent ICH and DNR / DNI status  - no further NSVT Lopressor 25 mg PO BID  - Abx per medical team  - can D/C tele    Rafiq Brush MD, [facility]  BEEPER [phone]

## 2020-09-01 NOTE — CHART NOTE - NSCHARTNOTEFT_GEN_A_CORE
Night team was notified by nurse manager that pt is DNR but there is no MOLST form in the chart. Given no emergency condition right now pts family has not be contacted.   Primary team to please contact the family in the morning and place the MOLST form in the pt chart.

## 2020-09-01 NOTE — DISCHARGE NOTE PROVIDER - NSDCFUADDAPPT_GEN_ALL_CORE_FT
Follow up with PC within 1 week Follow up with Dr Linares and Dr Rain within 2 weeks of your discharge

## 2020-09-01 NOTE — PHYSICAL THERAPY INITIAL EVALUATION ADULT - PERTINENT HX OF CURRENT PROBLEM, REHAB EVAL
Pt. was sent in from [facility] after a fall. Brain CT revealed  right temporal parietal/occipital hemorrhage and presumed infarct is identified with  no significant change from previous CT.

## 2020-09-01 NOTE — PROGRESS NOTE ADULT - PROBLEM SELECTOR PLAN 3
Patient had a fall at rehab.  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 Continued correlation and follow-up is recommended.  Echo done on August 24, 2020 normal systolic function .  Will monitor the patient on tele.  5 beats NSVT this AM, pt asymptomatic  Start 25 Lopressor BID  Fall precautions  Cardiology Dr. Brush

## 2020-09-01 NOTE — PROVIDER CONTACT NOTE (CRITICAL VALUE NOTIFICATION) - TEST AND RESULT REPORTED:
blood culture anaerobic gram positive cocci in culster blood culture anaerobic gram positive cocci in cluster

## 2020-09-01 NOTE — PHYSICAL THERAPY INITIAL EVALUATION ADULT - 30 SECOND CHAIR STAND TEST, REHAB EVAL
According to 30 second chair stand test, patient's score is at 0  which reveals significant functional weakness and puts the patient at risk for falls. Based on the score mentioned above, patient is unsafe to be discharged to home and will greatly benefit from sub-acute rehab placement.

## 2020-09-01 NOTE — PHYSICAL THERAPY INITIAL EVALUATION ADULT - GENERAL OBSERVATIONS, REHAB EVAL
Pt. received supine in bed, NAD, on telemetry. Pt. cooperative during eval.  Pt. A&O x 2, able to follow single step commands.

## 2020-09-01 NOTE — DISCHARGE NOTE PROVIDER - CARE PROVIDER_API CALL
Humphrey Linares)  Neurology  Epilepsy  [facility], [address]  [place] [address]  Phone: [phone]  Follow Up Time: Humphrey Linares)  Neurology  Epilepsy  [address], [address]  [place] [address]  Phone: [phone]  Follow Up Time:     Justine Rain  NEUROSURGERY  [address], [address]  [place] [address]  Phone: [phone]  Fax: [phone]  Follow Up Time:

## 2020-09-01 NOTE — PROGRESS NOTE ADULT - SUBJECTIVE AND OBJECTIVE BOX
PGY 1 Note discussed with supervising resident and primary attending  PGY-1: Nehal Covarrubias MD  PAGER #: [phone] TILL 5:00 PM  Please contact On Call team 5PM - 8:30PM  Please contact Nightfloat team 8:30PM - 7:30AM  Patient is a 87y old  Female who presents with a chief complaint of Sepsis/ Fall (01 Sep 2020 10:07)    Brief [facility] Course: 87 female with medical history of HTN and ICH (diagnosed on )  was [facility]. Patient is AAO X 2 is a very poor historian. History was taken with the help of friend at bedside. Patient was recently discharged after she was diagnosed with ICH bleed to [facility] rehab and there she had a fall. No specific details about fall are known. Patients friend at bedside told us that she got a call from nursing home that patient had a fall and she is being transferred to hospital. Patient complains of abdominal pain and pain while urination associated with fever and chills.    ED: patient had a temp of 100.7F with ,  normal WBC. Patient's UA was sent and CT abdomen Pelvis and CT head was done.    Patients UA is positive for WBC 11-25 with positive nitrites and leukocyte esterase.  Patient urine and blood Cx were sent and she was given a dose of IV Zosyn .    INTERVAL HPI/OVERNIGHT EVENTS: Pt c/o headache this morning, given percocet, headache persisting. Will give IV tordol. Neurology to see pt. No new neurologic deficits noted. Cxs negative to date. Temperature 100.9 yesterday 3PM, Tylenol given, patient now afebrile. Will switch antibiotics from ctx to Vanc/Zosyn. ID Dr. Mcgill consulted. Will repeat CXR and UA. Cultures negative to date.  Will give 1L NS bolus for hypotension. K replaced. No further urinary complaints    MEDICATIONS  (STANDING):  amLODIPine   Tablet 10 milliGRAM(s) Oral daily  cefTRIAXone   IVPB 1000 milliGRAM(s) IV Intermittent every 24 hours  cefTRIAXone   IVPB 1000 milliGRAM(s) IV Intermittent every 24 hours  doxazosin 4 milliGRAM(s) Oral at bedtime  lisinopril 40 milliGRAM(s) Oral daily  metoclopramide Injectable 10 milliGRAM(s) IV Push once  metoprolol tartrate 25 milliGRAM(s) Oral two times a day  senna 2 Tablet(s) Oral at bedtime    MEDICATIONS  (PRN):  acetaminophen   Tablet .. 650 milliGRAM(s) Oral every 4 hours PRN Mild Pain (1 - 3)  ketorolac   Injectable 30 milliGRAM(s) IV Push every 6 hours PRN Severe Pain (7 - 10)  oxycodone    5 mG/acetaminophen 325 mG 1 Tablet(s) Oral every 4 hours PRN Moderate Pain (4 - 6)  polyethylene glycol 3350 17 Gram(s) Oral two times a day PRN Constipation      __________________________________________________  REVIEW OF SYSTEMS:  CONSTITUTIONAL: No fever, weight loss, or fatigue  RESPIRATORY: No cough, wheezing, chills or hemoptysis; No shortness of breath  CARDIOVASCULAR: No chest pain, palpitations, dizziness, or leg swelling  GASTROINTESTINAL: No abdominal pain. No nausea, vomiting, or hematemesis; No diarrhea or constipation. No melena or hematochezia.  GENITOURINARY: No dysuria or hematuria, no burning micturition, no polyuria, no urinary hesitancy, no nocturia, normal urinary frequency  NEUROLOGICAL: +headache No memory loss, loss of strength, numbness, or tremors  SKIN: No itching, burning, rashes, or lesions   All other ROS negative except noted above    Vital Signs Last 24 Hrs  T(C): 37.1 (01 Sep 2020 12:15), Max: 38.3 (31 Aug 2020 15:13)  T(F): 98.8 (01 Sep 2020 12:15), Max: 100.9 (31 Aug 2020 15:13)  HR: 94 (01 Sep 2020 12:18) (62 - 107)  BP: 98/80 (01 Sep 2020 12:18) (98/80 - 174/96)  BP(mean): 84 (01 Sep 2020 12:18) (62 - 84)  RR: 18 (01 Sep 2020 12:15) (18 - 20)  SpO2: 100% (01 Sep 2020 12:18) (96% - 100%)    ________________________________________________  PHYSICAL EXAMINATION:  GENERAL: NAD, well-developed  HEAD:  Atraumatic, Normocephalic  EYES:  conjunctiva and sclera clear  NECK: Supple, No JVD, Normal thyroid  CHEST/LUNG: Clear to auscultation bilaterally; No rales, rhonchi, wheezing, or rubs  HEART: Regular rate and rhythm; No murmurs, rubs, or gallops  ABDOMEN: Soft, mild diffuse tenderness in LQs, mildly distended; Bowel sounds present  NERVOUS SYSTEM:  Alert & Oriented X2,  Moving all 4 extremities  EXTREMITIES:  Peripheral pulses palpable. No clubbing, cyanosis, or edema  SKIN: Warm, Dry, no rashes or lesions    _________________________________________________  LABS:                        10.5   8.33  )-----------( 286      ( 01 Sep 2020 06:43 )             32.5     09-    136  |  103  |  15  ----------------------------<  90  2.9<LL>   |  25  |  0.61    Ca    8.4      01 Sep 2020 06:43  Phos  2.7     09-  Mg     2.2     09-    TPro  6.0  /  Alb  2.5<L>  /  TBili  1.0  /  DBili  x   /  AST  24  /  ALT  20  /  AlkPhos  59  08-31      Urinalysis Basic - ( 30 Aug 2020 16:28 )    Color: Yellow / Appearance: Hazy / S.015 / pH: x  Gluc: x / Ketone: Small  / Bili: Negative / Urobili: 1   Blood: x / Protein: 30 mg/dL / Nitrite: Positive   Leuk Esterase: Moderate / RBC: >50 /HPF / WBC 11-25 /HPF   Sq Epi: x / Non Sq Epi: Few /HPF / Bacteria: Many /HPF      CAPILLARY BLOOD GLUCOSE            RADIOLOGY & ADDITIONAL TESTS:    Imaging Personally Reviewed:  YES    Consultant(s) Notes Reviewed:   YES    Care Discussed with Consultants : YES     Plan of care was discussed with patient and /or primary care giver; all questions and concerns were addressed and care was aligned with patient's wishes. PGY 1 Note discussed with supervising resident and primary attending  PGY-1: Nehal Covarrubias MD  PAGER #: [phone] TILL 5:00 PM  Please contact On Call team 5PM - 8:30PM  Please contact Nightfloat team 8:30PM - 7:30AM  Patient is a 87y old  Female who presents with a chief complaint of Sepsis/ Fall (01 Sep 2020 10:07)    Brief [facility] Course: 87 female with medical history of HTN and ICH (diagnosed on )  [facility]. Patient is AAO X 2 is a very poor historian. History was taken with the help of friend at bedside. Patient was recently discharged after she was diagnosed with ICH bleed to [facility] rehab and there she had a fall. No specific details about fall are known. Patients friend at bedside told us that she got a call from nursing home that patient had a fall and she is being transferred to hospital. Patient complains of abdominal pain and pain while urination associated with fever and chills.    ED: patient had a temp of 100.7F with ,  normal WBC. Patient's UA was sent and CT abdomen Pelvis and CT head was done.    Patients UA is positive for WBC 11-25 with positive nitrites and leukocyte esterase.  Patient urine and blood Cx were sent and she was given a dose of IV Zosyn .    INTERVAL HPI/OVERNIGHT EVENTS: Pt c/o headache this morning, given percocet, headache persisting. Will give IV tordol. Neurology to see pt. No new neurologic deficits noted. Cxs negative to date. Temperature 100.9 yesterday 3PM, Tylenol given, patient now afebrile. Will switch antibiotics from ctx to Vanc/Zosyn. ID Dr. Mcgill consulted. Will repeat CXR and UA. Cultures negative to date.  Will give 1L NS bolus for hypotension. K replaced. No further urinary complaints    MEDICATIONS  (STANDING):  amLODIPine   Tablet 10 milliGRAM(s) Oral daily  cefTRIAXone   IVPB 1000 milliGRAM(s) IV Intermittent every 24 hours  cefTRIAXone   IVPB 1000 milliGRAM(s) IV Intermittent every 24 hours  doxazosin 4 milliGRAM(s) Oral at bedtime  lisinopril 40 milliGRAM(s) Oral daily  metoclopramide Injectable 10 milliGRAM(s) IV Push once  metoprolol tartrate 25 milliGRAM(s) Oral two times a day  senna 2 Tablet(s) Oral at bedtime    MEDICATIONS  (PRN):  acetaminophen   Tablet .. 650 milliGRAM(s) Oral every 4 hours PRN Mild Pain (1 - 3)  ketorolac   Injectable 30 milliGRAM(s) IV Push every 6 hours PRN Severe Pain (7 - 10)  oxycodone    5 mG/acetaminophen 325 mG 1 Tablet(s) Oral every 4 hours PRN Moderate Pain (4 - 6)  polyethylene glycol 3350 17 Gram(s) Oral two times a day PRN Constipation      __________________________________________________  REVIEW OF SYSTEMS:  CONSTITUTIONAL: No fever, weight loss, or fatigue  RESPIRATORY: No cough, wheezing, chills or hemoptysis; No shortness of breath  CARDIOVASCULAR: No chest pain, palpitations, dizziness, or leg swelling  GASTROINTESTINAL: No abdominal pain. No nausea, vomiting, or hematemesis; No diarrhea or constipation. No melena or hematochezia.  GENITOURINARY: No dysuria or hematuria, no burning micturition, no polyuria, no urinary hesitancy, no nocturia, normal urinary frequency  NEUROLOGICAL: +headache No memory loss, loss of strength, numbness, or tremors  SKIN: No itching, burning, rashes, or lesions   All other ROS negative except noted above    Vital Signs Last 24 Hrs  T(C): 37.1 (01 Sep 2020 12:15), Max: 38.3 (31 Aug 2020 15:13)  T(F): 98.8 (01 Sep 2020 12:15), Max: 100.9 (31 Aug 2020 15:13)  HR: 94 (01 Sep 2020 12:18) (62 - 107)  BP: 98/80 (01 Sep 2020 12:18) (98/80 - 174/96)  BP(mean): 84 (01 Sep 2020 12:18) (62 - 84)  RR: 18 (01 Sep 2020 12:15) (18 - 20)  SpO2: 100% (01 Sep 2020 12:18) (96% - 100%)    ________________________________________________  PHYSICAL EXAMINATION:  GENERAL: NAD, well-developed  HEAD:  Atraumatic, Normocephalic  EYES:  conjunctiva and sclera clear  NECK: Supple, No JVD, Normal thyroid  CHEST/LUNG: Clear to auscultation bilaterally; No rales, rhonchi, wheezing, or rubs  HEART: Regular rate and rhythm; No murmurs, rubs, or gallops  ABDOMEN: Soft, mild diffuse tenderness in LQs, mildly distended; Bowel sounds present  NERVOUS SYSTEM:  Alert & Oriented X2,  Moving all 4 extremities  EXTREMITIES:  Peripheral pulses palpable. No clubbing, cyanosis, or edema  SKIN: Warm, Dry, no rashes or lesions    _________________________________________________  LABS:                        10.5   8.33  )-----------( 286      ( 01 Sep 2020 06:43 )             32.5     09-    136  |  103  |  15  ----------------------------<  90  2.9<LL>   |  25  |  0.61    Ca    8.4      01 Sep 2020 06:43  Phos  2.7     09-  Mg     2.2     09-    TPro  6.0  /  Alb  2.5<L>  /  TBili  1.0  /  DBili  x   /  AST  24  /  ALT  20  /  AlkPhos  59  08-31      Urinalysis Basic - ( 30 Aug 2020 16:28 )    Color: Yellow / Appearance: Hazy / S.015 / pH: x  Gluc: x / Ketone: Small  / Bili: Negative / Urobili: 1   Blood: x / Protein: 30 mg/dL / Nitrite: Positive   Leuk Esterase: Moderate / RBC: >50 /HPF / WBC 11-25 /HPF   Sq Epi: x / Non Sq Epi: Few /HPF / Bacteria: Many /HPF      RADIOLOGY & ADDITIONAL TESTS:    Imaging Personally Reviewed:  YES    Consultant(s) Notes Reviewed:   YES    Care Discussed with Consultants: YES     Plan of care was discussed with patient and /or primary care giver; all questions and concerns were addressed and care was aligned with patient's wishes.

## 2020-09-01 NOTE — PROGRESS NOTE ADULT - PROBLEM SELECTOR PLAN 1
Patients UA is positive for WBC 11-25 with positive nitrites and leukocyte esterase.  UCx/BCx negative to date  On Ctx 8/30-9/1  Abx escalated to Vanc/Zosyn 9/1 for Tmax 100.9 yesterday  ID Dr. Mcgill

## 2020-09-01 NOTE — CONSULT NOTE ADULT - SUBJECTIVE AND OBJECTIVE BOX
+++++++++++++++++++++++++++NOTE NOT COMPLETED++++++++++++++++++++++++++++++++++++++ Patient is a 87y old  Female who presents with a chief complaint of Sepsis/ Fall (01 Sep 2020 13:30)      HPI:  87 female with medical history of HTN and ICH (diagnosed on )  [facility]. Patient is AAO X 2 is a very poor historian. History was taken with the help of friend at bedside. Patient was recently discharged after she was diagnosed with ICH bleed to [facility] rehab and there she had a fall. No specific details about fall are known. Patients friend at bedside told us that she got a call from nursing home that patient had a fall and she is being transferred to hospital. Patient complains of abdominal pain and pain while urination associated with fever and chills.    ED: patient had a temp of 100.7F with ,  normal WBC. Patient's UA was sent and CT abdomen Pelvis and CT head was done.    Patients UA is positive for WBC 11-25 with positive nitrites and leukocyte esterase.  Patient urine and blood Cx were sent and she was given a dose of IV Zosyn . (30 Aug 2020 18:33)       Pt states she's had a headache since she came to the hospital.  Unable to obtain additional info b/c pt requested to rest.  Sister at bedside did not have additional information/history.      Neurological Review of Systems:  No difficulty with language.  No vision loss or double vision.  No dizziness, vertigo or new hearing loss.  No difficulty with speech or swallowing.  No focal weakness.  No focal sensory changes.  No numbness or tingling in the bilateral lower extremities.  No difficulty with balance.  No difficulty with ambulation.        MEDICATIONS  (STANDING):  amLODIPine   Tablet 10 milliGRAM(s) Oral daily  doxazosin 4 milliGRAM(s) Oral at bedtime  lisinopril 40 milliGRAM(s) Oral daily  metoclopramide Injectable 10 milliGRAM(s) IV Push once  metoprolol tartrate 25 milliGRAM(s) Oral two times a day  piperacillin/tazobactam IVPB.. 3.375 Gram(s) IV Intermittent every 8 hours  senna 2 Tablet(s) Oral at bedtime  vancomycin  IVPB 1000 milliGRAM(s) IV Intermittent every 12 hours    MEDICATIONS  (PRN):  acetaminophen   Tablet .. 650 milliGRAM(s) Oral every 4 hours PRN Mild Pain (1 - 3)  ketorolac   Injectable 30 milliGRAM(s) IV Push every 6 hours PRN Severe Pain (7 - 10)  oxycodone    5 mG/acetaminophen 325 mG 1 Tablet(s) Oral every 4 hours PRN Moderate Pain (4 - 6)  polyethylene glycol 3350 17 Gram(s) Oral two times a day PRN Constipation    Allergies    No Known Allergies    Intolerances      PAST MEDICAL & SURGICAL HISTORY:  Breast cancer: right s/p resection  Essential hypertension  Breast CA  HTN (hypertension)  No significant past surgical history  H/O mastectomy  History of colon resection    FAMILY HISTORY:  No pertinent family history in first degree relatives    SOCIAL HISTORY: non smoker    Review of Systems:  Constitutional: No generalized weakness. No fevers or chills.                    Eyes, Ears, Mouth, Throat: No vision loss.  (+) HA  Respiratory: No shortness of breath or cough                            Cardiovascular: No chest pain or palpitations  Gastrointestinal: No nausea or vomiting                                        Genitourinary: No urinary incontinence or burning on urination  Musculoskeletal: No joint pain                                                         Dermatologic: No rash  Neurological: as per HPI                                                                      Psychiatric: No behavioral problems  Endocrine: No known hypoglycemia             Hematologic/Lymphatic: No easy bleeding    O:  Vital Signs Last 24 Hrs  T(C): 36.6 (01 Sep 2020 15:58), Max: 37.2 (31 Aug 2020 19:45)  T(F): 97.9 (01 Sep 2020 15:58), Max: 99 (31 Aug 2020 19:45)  HR: 87 (01 Sep 2020 15:58) (85 - 107)  BP: 164/61 (01 Sep 2020 15:58) (98/80 - 164/61)  BP(mean): 84 (01 Sep 2020 12:18) (62 - 84)  RR: 16 (01 Sep 2020 15:58) (16 - 18)  SpO2: 99% (01 Sep 2020 15:58) (96% - 100%)    General Exam:   General appearance: No acute distress                 Cardiovascular: Pedal dorsalis pulses intact bilaterally    Mental Status: Orientated to self, date and place.  Attention impaired.        Cranial Nerves: CN I - not tested.  PERRL, EOMI, VFF, no nystagmus or diplopia.  No APD.  Fundi not visualized.  CN V1-3 intact to light touch.  No facial asymmetry.  Hearing intact to finger rub bilaterally.  Tongue, uvula and palate midline.  Sternocleidomastoid and Trapezius intact bilaterally.    Motor:   Tone: Normal                Strength unable to assess at this time   Pronator drift unable to assess at this time               Dysmetria unable to assess at this time   No truncal ataxia.  No resting, postural or action tremor.  No myoclonus.    Sensation: intact to light touch    Deep Tendon Reflexes: 1+ bilateral biceps, triceps, brachioradialis, knee and ankle  Toes flexor bilaterally    Gait: Deferred at this time    Other:     LABS:                        10.5   8.33  )-----------( 286      ( 01 Sep 2020 06:43 )             32.5     09-    136  |  103  |  15  ----------------------------<  90  2.9<LL>   |  25  |  0.61    Ca    8.4      01 Sep 2020 06:43  Phos  2.7       Mg     2.2         TPro  6.0  /  Alb  2.5<L>  /  TBili  1.0  /  DBili  x   /  AST  24  /  ALT  20  /  AlkPhos  59        Urinalysis Basic - ( 30 Aug 2020 16:28 )    Color: Yellow / Appearance: Hazy / S.015 / pH: x  Gluc: x / Ketone: Small  / Bili: Negative / Urobili: 1   Blood: x / Protein: 30 mg/dL / Nitrite: Positive   Leuk Esterase: Moderate / RBC: >50 /HPF / WBC 11-25 /HPF   Sq Epi: x / Non Sq Epi: Few /HPF / Bacteria: Many /HPF          RADIOLOGY & ADDITIONAL STUDIES:  < from: CT Head No Cont (20 @ 13:28) >    EXAM:  CT BRAIN                            PROCEDURE DATE:  2020          INTERPRETATION:  INDICATION:  Brain hemorrhage. Follow-up.  TECHNIQUE:  A non contrast 2.5mm axial CT study of the brain was performed from skull base to vertex. Coronal and sagittal reformations were generated from the axial data.  COMPARISON EXAMINATION:  CT 2020    FINDINGS:    Again identified is a hemorrhage in the right temporal parietal and occipital lobes with surrounding edema. There is no further hemorrhage when compared with prior study. Surrounding edema and swelling is slightly more prominent.    Again noted is a blood layering in the ventricles, along with effacement of the occipital horn of the right lateral ventricle.    A small subdural hematoma is again noted along the posterior falx.    Involutional changes and volume loss are noted in both hemispheres.    Subfalcian shift is again noted to the left, without uncal herniation.    IMPRESSION:  1)  stable follow-up study. 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 Continued correlation and follow-up is recommended. See above.    Follow-up MR imaging recommended for further assessment.  2)  clear sinuses and mastoids..                AAMIR PEÑA M.D., ATTENDING RADIOLOGIST  This document has been electronically signed. Aug 30 2020  1:47PM                < end of copied text > Patient is a 87y old  Female who presents with a chief complaint of Sepsis/ Fall (01 Sep 2020 13:30)      HPI:  87 female with medical history of HTN and ICH (diagnosed on )  [facility]. Patient is AAO X 2 is a very poor historian. History was taken with the help of friend at bedside. Patient was recently discharged after she was diagnosed with ICH bleed to [facility] rehab and there she had a fall. No specific details about fall are known. Patients friend at bedside told us that she got a call from nursing home that patient had a fall and she is being transferred to hospital. Patient complains of abdominal pain and pain while urination associated with fever and chills.    ED: patient had a temp of 100.7F with ,  normal WBC. Patient's UA was sent and CT abdomen Pelvis and CT head was done.    Patients UA is positive for WBC 11-25 with positive nitrites and leukocyte esterase.  Patient urine and blood Cx were sent and she was given a dose of IV Zosyn . (30 Aug 2020 18:33)       Pt states she's had a headache since she came to the hospital.  Unable to obtain additional info b/c pt requested to rest.  Sister at bedside did not have additional information/history.  -Jay # [id number].    Neurological Review of Systems:  No difficulty with language.  No vision loss or double vision.  No dizziness, vertigo or new hearing loss.  No difficulty with speech or swallowing.  No focal weakness.  No focal sensory changes.  No numbness or tingling in the bilateral lower extremities.  No difficulty with balance.  No difficulty with ambulation.        MEDICATIONS  (STANDING):  amLODIPine   Tablet 10 milliGRAM(s) Oral daily  doxazosin 4 milliGRAM(s) Oral at bedtime  lisinopril 40 milliGRAM(s) Oral daily  metoclopramide Injectable 10 milliGRAM(s) IV Push once  metoprolol tartrate 25 milliGRAM(s) Oral two times a day  piperacillin/tazobactam IVPB.. 3.375 Gram(s) IV Intermittent every 8 hours  senna 2 Tablet(s) Oral at bedtime  vancomycin  IVPB 1000 milliGRAM(s) IV Intermittent every 12 hours    MEDICATIONS  (PRN):  acetaminophen   Tablet .. 650 milliGRAM(s) Oral every 4 hours PRN Mild Pain (1 - 3)  ketorolac   Injectable 30 milliGRAM(s) IV Push every 6 hours PRN Severe Pain (7 - 10)  oxycodone    5 mG/acetaminophen 325 mG 1 Tablet(s) Oral every 4 hours PRN Moderate Pain (4 - 6)  polyethylene glycol 3350 17 Gram(s) Oral two times a day PRN Constipation    Allergies    No Known Allergies    Intolerances      PAST MEDICAL & SURGICAL HISTORY:  Breast cancer: right s/p resection  Essential hypertension  Breast CA  HTN (hypertension)  No significant past surgical history  H/O mastectomy  History of colon resection    FAMILY HISTORY:  No pertinent family history in first degree relatives    SOCIAL HISTORY: non smoker    Review of Systems:  Constitutional: No generalized weakness. No fevers or chills.                    Eyes, Ears, Mouth, Throat: No vision loss.  (+) HA  Respiratory: No shortness of breath or cough                            Cardiovascular: No chest pain or palpitations  Gastrointestinal: No nausea or vomiting                                        Genitourinary: No urinary incontinence or burning on urination  Musculoskeletal: No joint pain                                                         Dermatologic: No rash  Neurological: as per HPI                                                                      Psychiatric: No behavioral problems  Endocrine: No known hypoglycemia             Hematologic/Lymphatic: No easy bleeding    O:  Vital Signs Last 24 Hrs  T(C): 36.6 (01 Sep 2020 15:58), Max: 37.2 (31 Aug 2020 19:45)  T(F): 97.9 (01 Sep 2020 15:58), Max: 99 (31 Aug 2020 19:45)  HR: 87 (01 Sep 2020 15:58) (85 - 107)  BP: 164/61 (01 Sep 2020 15:58) (98/80 - 164/61)  BP(mean): 84 (01 Sep 2020 12:18) (62 - 84)  RR: 16 (01 Sep 2020 15:58) (16 - 18)  SpO2: 99% (01 Sep 2020 15:58) (96% - 100%)    General Exam:   General appearance: No acute distress                 Cardiovascular: Pedal dorsalis pulses intact bilaterally    Mental Status: Orientated to self, date and place.  Attention impaired.        Cranial Nerves: CN I - not tested.  PERRL, EOMI, VFF, no nystagmus or diplopia.  No APD.  Fundi not visualized.  CN V1-3 intact to light touch.  No facial asymmetry.  Hearing intact to finger rub bilaterally.  Tongue, uvula and palate midline.  Sternocleidomastoid and Trapezius intact bilaterally.    Motor:   Tone: Normal                Strength unable to assess at this time   Pronator drift unable to assess at this time               Dysmetria unable to assess at this time   No truncal ataxia.  No resting, postural or action tremor.  No myoclonus.    Sensation: intact to light touch    Deep Tendon Reflexes: 1+ bilateral biceps, triceps, brachioradialis, knee and ankle  Toes flexor bilaterally    Gait: Deferred at this time    Other:     LABS:                        10.5   8.33  )-----------( 286      ( 01 Sep 2020 06:43 )             32.5     09-    136  |  103  |  15  ----------------------------<  90  2.9<LL>   |  25  |  0.61    Ca    8.4      01 Sep 2020 06:43  Phos  2.7       Mg     2.2     -    TPro  6.0  /  Alb  2.5<L>  /  TBili  1.0  /  DBili  x   /  AST  24  /  ALT  20  /  AlkPhos  59        Urinalysis Basic - ( 30 Aug 2020 16:28 )    Color: Yellow / Appearance: Hazy / S.015 / pH: x  Gluc: x / Ketone: Small  / Bili: Negative / Urobili: 1   Blood: x / Protein: 30 mg/dL / Nitrite: Positive   Leuk Esterase: Moderate / RBC: >50 /HPF / WBC 11-25 /HPF   Sq Epi: x / Non Sq Epi: Few /HPF / Bacteria: Many /HPF          RADIOLOGY & ADDITIONAL STUDIES:  < from: CT Head No Cont (20 @ 13:28) >    EXAM:  CT BRAIN                            PROCEDURE DATE:  2020          INTERPRETATION:  INDICATION:  Brain hemorrhage. Follow-up.  TECHNIQUE:  A non contrast 2.5mm axial CT study of the brain was performed from skull base to vertex. Coronal and sagittal reformations were generated from the axial data.  COMPARISON EXAMINATION:  CT 2020    FINDINGS:    Again identified is a hemorrhage in the right temporal parietal and occipital lobes with surrounding edema. There is no further hemorrhage when compared with prior study. Surrounding edema and swelling is slightly more prominent.    Again noted is a blood layering in the ventricles, along with effacement of the occipital horn of the right lateral ventricle.    A small subdural hematoma is again noted along the posterior falx.    Involutional changes and volume loss are noted in both hemispheres.    Subfalcian shift is again noted to the left, without uncal herniation.    IMPRESSION:  1)  stable follow-up study. 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 Continued correlation and follow-up is recommended. See above.    Follow-up MR imaging recommended for further assessment.  2)  clear sinuses and mastoids..                AAMIR PEÑA M.D., ATTENDING RADIOLOGIST  This document has been electronically signed. Aug 30 2020  1:47PM                < end of copied text > Patient is a 87y old  Female who presents with a chief complaint of Sepsis/ Fall (01 Sep 2020 13:30)    HPI:  87 female with medical history of HTN and ICH (diagnosed on )  [facility]. Patient is AAO X 2 is a very poor historian. History was taken with the help of friend at bedside. Patient was recently discharged after she was diagnosed with ICH bleed to [facility] rehab and there she had a fall. No specific details about fall are known. Patients friend at bedside told us that she got a call from nursing home that patient had a fall and she is being transferred to hospital. Patient complains of abdominal pain and pain while urination associated with fever and chills.    ED: patient had a temp of 100.7F with ,  normal WBC. Patient's UA was sent and CT abdomen Pelvis and CT head was done.    Patients UA is positive for WBC 11-25 with positive nitrites and leukocyte esterase.  Patient urine and blood Cx were sent and she was given a dose of IV Zosyn . (30 Aug 2020 18:33)     Pt states she's had a headache since she came to the hospital.  Unable to obtain additional info b/c pt requested to rest.  Sister at bedside did not have additional information/history.  -Jay # [id number].    Neurological Review of Systems:  No difficulty with language.  No vision loss or double vision.  No dizziness, vertigo or new hearing loss.  No difficulty with speech or swallowing.  No focal weakness.  No focal sensory changes.  No numbness or tingling in the bilateral lower extremities.  No difficulty with balance.  No difficulty with ambulation.        MEDICATIONS  (STANDING):  amLODIPine   Tablet 10 milliGRAM(s) Oral daily  doxazosin 4 milliGRAM(s) Oral at bedtime  lisinopril 40 milliGRAM(s) Oral daily  metoclopramide Injectable 10 milliGRAM(s) IV Push once  metoprolol tartrate 25 milliGRAM(s) Oral two times a day  piperacillin/tazobactam IVPB.. 3.375 Gram(s) IV Intermittent every 8 hours  senna 2 Tablet(s) Oral at bedtime  vancomycin  IVPB 1000 milliGRAM(s) IV Intermittent every 12 hours    MEDICATIONS  (PRN):  acetaminophen   Tablet .. 650 milliGRAM(s) Oral every 4 hours PRN Mild Pain (1 - 3)  ketorolac   Injectable 30 milliGRAM(s) IV Push every 6 hours PRN Severe Pain (7 - 10)  oxycodone    5 mG/acetaminophen 325 mG 1 Tablet(s) Oral every 4 hours PRN Moderate Pain (4 - 6)  polyethylene glycol 3350 17 Gram(s) Oral two times a day PRN Constipation    Allergies  No Known Allergies      PAST MEDICAL & SURGICAL HISTORY:  Breast cancer: right s/p resection  Essential hypertension  Breast CA  HTN (hypertension)  No significant past surgical history  H/O mastectomy  History of colon resection    FAMILY HISTORY:  No reported family history of stroke    SOCIAL HISTORY: Nonsmoker    Review of Systems:  Constitutional: No generalized weakness. No fevers or chills.                    Eyes, Ears, Mouth, Throat: No vision loss.  (+) HA  Respiratory: No shortness of breath or cough                            Cardiovascular: No chest pain or palpitations  Gastrointestinal: No nausea or vomiting                                        Genitourinary: No urinary incontinence or burning on urination  Musculoskeletal: No joint pain                                                         Dermatologic: No rash  Neurological: as per HPI                                                                      Psychiatric: No behavioral problems  Endocrine: No known hypoglycemia             Hematologic/Lymphatic: No easy bleeding      O:  Vital Signs Last 24 Hrs  T(C): 36.6 (01 Sep 2020 15:58), Max: 37.2 (31 Aug 2020 19:45)  T(F): 97.9 (01 Sep 2020 15:58), Max: 99 (31 Aug 2020 19:45)  HR: 87 (01 Sep 2020 15:58) (85 - 107)  BP: 164/61 (01 Sep 2020 15:58) (98/80 - 164/61)  BP(mean): 84 (01 Sep 2020 12:18) (62 - 84)  RR: 16 (01 Sep 2020 15:58) (16 - 18)  SpO2: 99% (01 Sep 2020 15:58) (96% - 100%)    General Exam:   General appearance: No acute distress                 Cardiovascular: Pedal dorsalis pulses intact bilaterally    Mental Status: Oriented to self, date and place.  Attention impaired.        Cranial Nerves: CN I - not tested.  PERRL, EOMI x 2, VFF x 4, no nystagmus or diplopia.  No APD.  Fundi not visualized.  CN V1-3 intact to light touch.  No facial asymmetry.  Hearing intact to finger rub bilaterally.  Tongue, uvula and palate midline.  Sternocleidomastoid and Trapezius intact bilaterally.    Motor:   Tone: Normal x 4  Strength at least 4/5 strength x 4  Pronator drift absent b/l          Dysmetria absent x 4   No truncal ataxia.  No resting, postural or action tremor.  No myoclonus.    Sensation: intact to light touch    Deep Tendon Reflexes: 1+ bilateral biceps, triceps, brachioradialis, knee and ankle  Toes flexor bilaterally    Gait: Deferred at this time        LABS:                        10.5   8.33  )-----------( 286      ( 01 Sep 2020 06:43 )             32.5     09-    136  |  103  |  15  ----------------------------<  90  2.9<LL>   |  25  |  0.61    Ca    8.4      01 Sep 2020 06:43  Phos  2.7     -  Mg     2.2     -    TPro  6.0  /  Alb  2.5<L>  /  TBili  1.0  /  DBili  x   /  AST  24  /  ALT  20  /  AlkPhos  59      Urinalysis Basic - ( 30 Aug 2020 16:28 )  Color: Yellow / Appearance: Hazy / S.015 / pH: x  Gluc: x / Ketone: Small  / Bili: Negative / Urobili: 1   Blood: x / Protein: 30 mg/dL / Nitrite: Positive   Leuk Esterase: Moderate / RBC: >50 /HPF / WBC 11-25 /HPF   Sq Epi: x / Non Sq Epi: Few /HPF / Bacteria: Many /HPF          RADIOLOGY & ADDITIONAL STUDIES:  < from: CT Head No Cont (20 @ 13:28) >    EXAM:  CT BRAIN                            PROCEDURE DATE:  2020          INTERPRETATION:  INDICATION:  Brain hemorrhage. Follow-up.  TECHNIQUE:  A non contrast 2.5mm axial CT study of the brain was performed from skull base to vertex. Coronal and sagittal reformations were generated from the axial data.  COMPARISON EXAMINATION:  CT 2020    FINDINGS:    Again identified is a hemorrhage in the right temporal parietal and occipital lobes with surrounding edema. There is no further hemorrhage when compared with prior study. Surrounding edema and swelling is slightly more prominent.    Again noted is a blood layering in the ventricles, along with effacement of the occipital horn of the right lateral ventricle.    A small subdural hematoma is again noted along the posterior falx.    Involutional changes and volume loss are noted in both hemispheres.    Subfalcian shift is again noted to the left, without uncal herniation.    IMPRESSION:  1)  stable follow-up study. 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 Continued correlation and follow-up is recommended. See above.    Follow-up MR imaging recommended for further assessment.  2)  clear sinuses and mastoids..                AAMIR PEÑA M.D., ATTENDING RADIOLOGIST  This document has been electronically signed. Aug 30 2020  1:47PM                < end of copied text >

## 2020-09-01 NOTE — DISCHARGE NOTE PROVIDER - CARE PROVIDERS DIRECT ADDRESSES
[email or web address]

## 2020-09-01 NOTE — PHYSICAL THERAPY INITIAL EVALUATION ADULT - PLANNED THERAPY INTERVENTIONS, PT EVAL
strengthening/bed mobility training/transfer training/postural re-education/balance training/gait training

## 2020-09-01 NOTE — DISCHARGE NOTE PROVIDER - HOSPITAL COURSE
87 female with medical history of HTN and ICH (diagnosed on 23rd August)  was [facility]. Patient is AAO X 2 is a very poor historian. History was taken with the help of friend at bedside. Patient was recently discharged after she was diagnosed with ICH bleed to [facility] rehab and there she had a fall. No specific details about fall are known. Patients friend at bedside told us that she got a call from nursing home that patient had a fall and she is being transferred to hospital. Patient complains of abdominal pain and pain while urination associated with fever and chills.        ED: patient had a temp of 100.7F with ,  normal WBC. Patient's UA was sent and CT abdomen Pelvis and CT head was done.        Patients UA is positive for WBC 11-25 with positive nitrites and leukocyte esterase.    Patient urine and blood Cx were sent and she was given a dose of IV Zosyn .        Pt was treated with ceftriaxone for sepsis 2/2 UTI    Blood culture and urine culture came back negative.    Pt clinically stable for discharge. 87 female with medical history of HTN and ICH (diagnosed on 23rd August)  [facility]. Patient is AAO X 2 is a very poor historian. History was taken with the help of friend at bedside. Patient was recently discharged after she was diagnosed with ICH bleed to [facility] rehab and there she had a fall. No specific details about fall are known. Patients friend at bedside told us that she got a call from nursing home that patient had a fall and she is being transferred to hospital. Patient complains of abdominal pain and pain while urination associated with fever and chills.        ED: patient had a temp of 100.7F with ,  normal WBC. Patient's UA was sent and CT abdomen Pelvis and CT head was done.        Patient's UA is positive for WBC 11-25 with positive nitrites and leukocyte esterase.    Patient urine and blood Cx were sent and she was given a dose of IV Zosyn .        Pt was treated with ceftriaxone for sepsis 2/2 UTI    Blood culture and urine culture came back negative.    Pt clinically stable for discharge. 87 female with medical history of HTN and ICH (diagnosed on 23rd August)  [facility]. Patient is AAO X 2 is a very poor historian. History was taken with the help of friend at bedside. Patient was recently discharged after she was diagnosed with ICH bleed to [facility] rehab and there she had a fall. No specific details about fall are known. Patients friend at bedside told us that she got a call from nursing home that patient had a fall and she is being transferred to hospital. Patient complains of abdominal pain and pain while urination associated with fever and chills.        ED: patient had a temp of 100.7F with ,  normal WBC. Patient's UA was sent and CT abdomen Pelvis and CT head was done. Patient's UA is positive for WBC 11-25 with positive nitrites and leukocyte esterase.    Patient urine and blood Cx were sent and she was given a dose of IV Zosyn.        Pt was treated with ceftriaxone for sepsis 2/2 UTI. Patient was seen by ID consult.    Blood culture and urine culture came back negative. Antibiotics discontinued per ID.     Patient had NSVT, seen my cardilogu, lopressor adjusted    Pt clinically stable for discharge. 87 female with medical history of HTN and ICH (diagnosed on 23rd August)  was sent in from [facility] rehab after a fall. Patient is AAO X 2 is a very poor historian. History was taken with the help of friend at bedside. Patient was recently discharged after she was diagnosed with ICH bleed to [facility] rehab and there she had a fall. No specific details about fall are known. Patients friend at bedside told us that she got a call from nursing home that patient had a fall and she is being transferred to hospital. Patient complains of abdominal pain and pain while urination associated with fever and chills.        ED: patient had a temp of 100.7F with ,  normal WBC. Patient's UA was sent and CT abdomen Pelvis and CT head was done. Patient's UA is positive for WBC 11-25 with positive nitrites and leukocyte esterase.    Patient urine and blood Cx were sent and she was given a dose of IV Zosyn.        Pt was treated with ceftriaxone for sepsis 2/2 UTI. Patient was seen by ID consult.    Blood culture and urine culture came back negative. Antibiotics discontinued per ID.     Patient had NSVT, seen my cardilogu, lopressor adjusted. Patient was seen by neurology repeat CT is stable was give the following cocktail (ALL 3 given together Q8h) : Valproate 500mg IV q 8h + Benadryl 25mg PO Q8h + Reglan 10mg IV Q8h per neurologist        Pt clinically stable for discharge. 87 female with medical history of HTN and ICH (diagnosed on 23rd August)  was sent in from [facility] rehab after a fall. Patient is AAO X 2 is a very poor historian. History was taken with the help of friend at bedside. Patient was recently discharged after she was diagnosed with ICH bleed to [facility] rehab and there she had a fall. No specific details about fall are known. Patients friend at bedside told us that she got a call from nursing home that patient had a fall and she is being transferred to hospital. Patient complains of abdominal pain and pain while urination associated with fever and chills.        ED: patient had a temp of 100.7F with ,  normal WBC. Patient's UA was sent and CT abdomen Pelvis and CT head was done. Patient's UA is positive for WBC 11-25 with positive nitrites and leukocyte esterase.    Patient urine and blood Cx were sent and she was given a dose of IV Zosyn.    Pt was treated with ceftriaxone for sepsis 2/2 UTI. Patient was seen by ID consult.    Blood culture and urine culture came back negative. Antibiotics discontinued per ID.     Patient had NSVT, seen my cardilogu, lopressor adjusted. Patient was seen by neurology repeat CT is stable was give the following cocktail (ALL 3 given together Q8h) : Valproate 500mg IV q 8h + Benadryl 25mg PO Q8h + Reglan 10mg IV Q8h per neurologist        Pt clinically stable for discharge.

## 2020-09-01 NOTE — CHART NOTE - NSCHARTNOTEFT_GEN_A_CORE
Advance directives: DNR/DNI    Lashawn Howard MD Advance directives: DNR/DNI    Time spent:  30 minutes. MOLST form signed.    Lashawn Howard MD.  9/1/2020 Advance directives: DNR/DNI. Advance care planning discussion done in detail. Time spent: 30 minutes. MOLST form signed.    Lashawn Howard MD.  9/1/2020

## 2020-09-01 NOTE — PROGRESS NOTE ADULT - ASSESSMENT
87 female with medical history of HTN and ICH (diagnosed on 23rd August)  was sent in from [facility] rehab after a fall. Patient is AAO X 2 is a very poor historian. History was taken with the help of friend at bed side. Patient was recently discharged after she was diagnosed with ICH bleed to [facility] rehab and there she had a fall. No specific details about fall are known Patients friend at bed side told us that she got a call from nursing home that patient had a fall and she is being transferred to hospital. Patient complains of abdominal pain and pain while urination associated with fever and chills.    Patient is admitted for Sepsis secondary to UTI. 87 female with medical history of HTN and ICH (diagnosed on 23rd August) was sent in from [facility] rehab after a fall. Patient is AAO X 2 is a very poor historian. History was taken with the help of friend at bed side. Patient was recently discharged after she was diagnosed with ICH bleed to [facility] rehab and there she had a fall. No specific details about fall are known. Patients friend at bed side told us that she got a call from nursing home that patient had a fall and she is being transferred to hospital. Patient complains of abdominal pain and pain while urination associated with fever and chills.    Patient is admitted for Sepsis secondary to UTI.

## 2020-09-01 NOTE — DISCHARGE NOTE PROVIDER - NSDCMRMEDTOKEN_GEN_ALL_CORE_FT
acetaminophen 325 mg oral tablet: 2 tab(s) orally every 6 hours, As needed, Temp greater or equal to 38C (100.4F), Mild Pain (1 - 3)  amLODIPine 10 mg oral tablet: 1 tab(s) orally once a day  doxazosin 4 mg oral tablet: 1 tab(s) orally once a day  lisinopril 40 mg oral tablet: 1 tab(s) orally once a day  melatonin 5 mg oral tablet: 1 tab(s) orally once a day (at bedtime)  polyethylene glycol 3350 oral powder for reconstitution: 17 gram(s) orally 2 times a day  senna oral tablet: 2 tab(s) orally once a day (at bedtime) acetaminophen 325 mg oral tablet: 2 tab(s) orally every 6 hours, As needed, Temp greater or equal to 38C (100.4F), Mild Pain (1 - 3)  amLODIPine 10 mg oral tablet: 1 tab(s) orally once a day  Depakote  mg oral tablet, extended release: 1 tab(s) oral - orally 3 times a day x 5 days  Depakote  mg oral tablet, extended release: 1 tab(s) orally 2 times a day   Depakote  mg oral tablet, extended release: 1 tab(s) orally once a day   doxazosin 4 mg oral tablet: 1 tab(s) orally once a day  lisinopril 40 mg oral tablet: 1 tab(s) orally once a day  melatonin 5 mg oral tablet: 1 tab(s) orally once a day (at bedtime)  polyethylene glycol 3350 oral powder for reconstitution: 17 gram(s) orally 2 times a day  Reglan 10 mg oral tablet: 1 tab(s) orally every 6 hours   senna oral tablet: 2 tab(s) orally once a day (at bedtime) acetaminophen 325 mg oral tablet: 2 tab(s) orally every 6 hours, As needed, Temp greater or equal to 38C (100.4F), Mild Pain (1 - 3)  amLODIPine 10 mg oral tablet: 1 tab(s) orally once a day  Depakote  mg oral tablet, extended release: 1 tab(s) oral - orally three times a day x 5 days  1 tab(s) oral - orally twice a day x 5 days  1 tab(s) oral - orally once a day x 5 days  doxazosin 4 mg oral tablet: 1 tab(s) orally once a day  lisinopril 40 mg oral tablet: 1 tab(s) orally once a day  melatonin 5 mg oral tablet: 1 tab(s) orally once a day (at bedtime)  metoprolol tartrate 100 mg oral tablet: 1 tab(s) orally 2 times a day  oxyCODONE: 2.5 milligram(s) orally 3 times a day  polyethylene glycol 3350 oral powder for reconstitution: 17 gram(s) orally 2 times a day  Reglan 10 mg oral tablet: 1 tab(s) orally every 6 hours as needed  senna oral tablet: 2 tab(s) orally once a day (at bedtime)

## 2020-09-01 NOTE — DISCHARGE NOTE PROVIDER - PROVIDER TOKENS
PROVIDER:[TOKEN:[[id number]]] PROVIDER:[TOKEN:[[id number]]],PROVIDER:[TOKEN:[[id number]]]

## 2020-09-01 NOTE — PROGRESS NOTE ADULT - PROBLEM SELECTOR PLAN 5
Patient has hx of ICH.  CT head showed CT head was done it is showing stable Intra cranial bleed compared to previous admission  Can consider MRI head  Pt now c/o headache, with no neuro deficits  Neurology to f/u re: possible reimaging   Goal Bp < 140  F/u neurology consult Dr. Linares

## 2020-09-01 NOTE — DISCHARGE NOTE PROVIDER - NSDCCAREPROVSEEN_GEN_ALL_CORE_FT
Anita, Lashawn Brush, Rafiq Mcgill, Landon Linares, Humphrey MD:  Lashawn Howard, Rafiq Mcgill, Humphrey Horn

## 2020-09-01 NOTE — CONSULT NOTE ADULT - ASSESSMENT
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  Therefore, recommend standing Hydralazine or Labetalol PRN for BP spikes.      -Repeat CT head.  If evidence of increased bleed change neuro checks to Q1h and consult ICU    -If repeat CT is stable give the following cocktail (ALL 3 given together Q8h) : Valproate 500mg IV q 8h + Benadryl 25mg PO Q8h + Reglan 10mg IV Q8h 88yo female w/ chronic ICH and fall    Recommendations:    -Strict BP control in pt w/ ICH, treat BP if over 140/90 (goal 120/80).  Therefore, recommend standing Hydralazine or Labetalol PRN for BP spikes.      -Repeat CT head.  If evidence of increased bleed change neuro checks to Q1h and consult ICU    -If repeat CT is stable give the following cocktail (ALL 3 given together Q8h) : Valproate 500mg IV Q8H, preceded by Diphenhydramine 25mg PO Q8H & Metocloprmaide 10mg IV Q8H

## 2020-09-01 NOTE — PROGRESS NOTE ADULT - PROBLEM SELECTOR PLAN 6
RISK                                                          Points  [] Previous VTE                                           3  [] Thrombophilia                                        2  [] Lower limb paralysis                              2   [] Current Cancer                                       2   [x] Immobilization > 24 hrs                        1  [] ICU/CCU stay > 24 hours                       1  [x] Age > 60                                                   1    holding off chemo prophylaxis for DVT for recent ICH, jordan continue with SCDs  GI ppx: Not indicated  Diet: Soft  Electrolytes replaced PRN  Dispo: [facility]YE  DNR/DNI - no MOSLT RISK                                                          Points  [] Previous VTE                                           3  [] Thrombophilia                                        2  [] Lower limb paralysis                              2   [] Current Cancer                                       2   [x] Immobilization > 24 hrs                        1  [] ICU/CCU stay > 24 hours                       1  [x] Age > 60                                                   1    holding off chemo prophylaxis for DVT for recent ICH, jordan continue with SCDs  GI ppx: Not indicated  Diet: Soft  Electrolytes replaced PRN  Dispo: KYE  DNR/DNI MOLST in chart

## 2020-09-01 NOTE — PHYSICAL THERAPY INITIAL EVALUATION ADULT - IMPAIRMENTS FOUND, PT EVAL
muscle strength/posture/arousal, attention, and cognition/gait, locomotion, and balance/aerobic capacity/endurance/fine motor/gross motor

## 2020-09-01 NOTE — PROGRESS NOTE ADULT - ATTENDING COMMENTS
Patient seen and examined. Patient's history, vitals, labs, imaging studies reviewed. Discussed with above resident, agree with note with edits and educated on the diagnosis and management of above medical conditions. F/u MRI brain per neurology. Plan of care discussed with patient, and agrees, all questions answered.   Lashawn Howard MD Patient seen and examined. Patient's history, vitals, labs, imaging studies reviewed. Discussed with above resident, agree with note with edits and educated on the diagnosis and management of above medical conditions. Repeat head CT today per neurology. Plan of care discussed with patient, and agrees, all questions answered.   Lashawn Howard MD

## 2020-09-01 NOTE — PHYSICAL THERAPY INITIAL EVALUATION ADULT - CRITERIA FOR SKILLED THERAPEUTIC INTERVENTIONS
risk reduction/prevention/rehab potential/predicted duration of therapy intervention/impairments found/functional limitations in following categories/therapy frequency/anticipated discharge recommendation

## 2020-09-02 DIAGNOSIS — E87.6 HYPOKALEMIA: ICD-10-CM

## 2020-09-02 DIAGNOSIS — R78.81 BACTEREMIA: ICD-10-CM

## 2020-09-02 LAB
ANION GAP SERPL CALC-SCNC: 7 MMOL/L — SIGNIFICANT CHANGE UP (ref 5–17)
BUN SERPL-MCNC: 13 MG/DL — SIGNIFICANT CHANGE UP (ref 7–18)
CALCIUM SERPL-MCNC: 8.3 MG/DL — LOW (ref 8.4–10.5)
CHLORIDE SERPL-SCNC: 103 MMOL/L — SIGNIFICANT CHANGE UP (ref 96–108)
CO2 SERPL-SCNC: 25 MMOL/L — SIGNIFICANT CHANGE UP (ref 22–31)
CREAT SERPL-MCNC: 0.8 MG/DL — SIGNIFICANT CHANGE UP (ref 0.5–1.3)
GLUCOSE SERPL-MCNC: 93 MG/DL — SIGNIFICANT CHANGE UP (ref 70–99)
HCT VFR BLD CALC: 31.9 % — LOW (ref 34.5–45)
HGB BLD-MCNC: 10.4 G/DL — LOW (ref 11.5–15.5)
MAGNESIUM SERPL-MCNC: 2.2 MG/DL — SIGNIFICANT CHANGE UP (ref 1.6–2.6)
MCHC RBC-ENTMCNC: 28.3 PG — SIGNIFICANT CHANGE UP (ref 27–34)
MCHC RBC-ENTMCNC: 32.6 GM/DL — SIGNIFICANT CHANGE UP (ref 32–36)
MCV RBC AUTO: 86.9 FL — SIGNIFICANT CHANGE UP (ref 80–100)
NRBC # BLD: 0 /100 WBCS — SIGNIFICANT CHANGE UP (ref 0–0)
PHOSPHATE SERPL-MCNC: 3 MG/DL — SIGNIFICANT CHANGE UP (ref 2.5–4.5)
PLATELET # BLD AUTO: 327 K/UL — SIGNIFICANT CHANGE UP (ref 150–400)
POTASSIUM SERPL-MCNC: 3.3 MMOL/L — LOW (ref 3.5–5.3)
POTASSIUM SERPL-SCNC: 3.3 MMOL/L — LOW (ref 3.5–5.3)
RBC # BLD: 3.67 M/UL — LOW (ref 3.8–5.2)
RBC # FLD: 14.7 % — HIGH (ref 10.3–14.5)
SODIUM SERPL-SCNC: 135 MMOL/L — SIGNIFICANT CHANGE UP (ref 135–145)
WBC # BLD: 7.2 K/UL — SIGNIFICANT CHANGE UP (ref 3.8–10.5)
WBC # FLD AUTO: 7.2 K/UL — SIGNIFICANT CHANGE UP (ref 3.8–10.5)

## 2020-09-02 RX ORDER — POTASSIUM CHLORIDE 20 MEQ
40 PACKET (EA) ORAL EVERY 4 HOURS
Refills: 0 | Status: COMPLETED | OUTPATIENT
Start: 2020-09-02 | End: 2020-09-02

## 2020-09-02 RX ADMIN — LISINOPRIL 40 MILLIGRAM(S): 2.5 TABLET ORAL at 06:55

## 2020-09-02 RX ADMIN — Medication 30 MILLIGRAM(S): at 14:46

## 2020-09-02 RX ADMIN — Medication 25 MILLIGRAM(S): at 06:55

## 2020-09-02 RX ADMIN — Medication 40 MILLIEQUIVALENT(S): at 14:06

## 2020-09-02 RX ADMIN — OXYCODONE AND ACETAMINOPHEN 1 TABLET(S): 5; 325 TABLET ORAL at 12:29

## 2020-09-02 RX ADMIN — OXYCODONE AND ACETAMINOPHEN 1 TABLET(S): 5; 325 TABLET ORAL at 13:45

## 2020-09-02 RX ADMIN — Medication 10 MILLIGRAM(S): at 14:10

## 2020-09-02 RX ADMIN — Medication 25 MILLIGRAM(S): at 14:06

## 2020-09-02 RX ADMIN — Medication 25 MILLIGRAM(S): at 22:03

## 2020-09-02 RX ADMIN — Medication 25 MILLIGRAM(S): at 17:02

## 2020-09-02 RX ADMIN — Medication 4 MILLIGRAM(S): at 22:03

## 2020-09-02 RX ADMIN — Medication 30 MILLIGRAM(S): at 22:03

## 2020-09-02 RX ADMIN — OXYCODONE AND ACETAMINOPHEN 1 TABLET(S): 5; 325 TABLET ORAL at 07:15

## 2020-09-02 RX ADMIN — OXYCODONE AND ACETAMINOPHEN 1 TABLET(S): 5; 325 TABLET ORAL at 08:00

## 2020-09-02 RX ADMIN — Medication 30 MILLIGRAM(S): at 22:33

## 2020-09-02 RX ADMIN — Medication 27.5 MILLIGRAM(S): at 14:06

## 2020-09-02 RX ADMIN — Medication 40 MILLIEQUIVALENT(S): at 17:02

## 2020-09-02 RX ADMIN — Medication 10 MILLIGRAM(S): at 22:03

## 2020-09-02 RX ADMIN — Medication 10 MILLIGRAM(S): at 06:55

## 2020-09-02 RX ADMIN — Medication 27.5 MILLIGRAM(S): at 22:04

## 2020-09-02 RX ADMIN — Medication 30 MILLIGRAM(S): at 15:15

## 2020-09-02 RX ADMIN — Medication 27.5 MILLIGRAM(S): at 06:56

## 2020-09-02 RX ADMIN — AMLODIPINE BESYLATE 10 MILLIGRAM(S): 2.5 TABLET ORAL at 06:55

## 2020-09-02 RX ADMIN — Medication 40 MILLIEQUIVALENT(S): at 10:29

## 2020-09-02 NOTE — PROGRESS NOTE ADULT - SUBJECTIVE AND OBJECTIVE BOX
Patient denies chest pain or shortness of breath.   Review of systems otherwise (-)  	  acetaminophen   Tablet .. 650 milliGRAM(s) Oral every 4 hours PRN  amLODIPine   Tablet 10 milliGRAM(s) Oral daily  diphenhydrAMINE 25 milliGRAM(s) Oral every 8 hours  doxazosin 4 milliGRAM(s) Oral at bedtime  ketorolac   Injectable 30 milliGRAM(s) IV Push every 6 hours PRN  lisinopril 40 milliGRAM(s) Oral daily  metoclopramide Injectable 10 milliGRAM(s) IV Push once  metoclopramide Injectable 10 milliGRAM(s) IV Push every 8 hours  metoprolol tartrate 25 milliGRAM(s) Oral two times a day  oxycodone    5 mG/acetaminophen 325 mG 1 Tablet(s) Oral every 4 hours PRN  piperacillin/tazobactam IVPB.. 3.375 Gram(s) IV Intermittent every 8 hours  polyethylene glycol 3350 17 Gram(s) Oral two times a day PRN  potassium chloride    Tablet ER 40 milliEquivalent(s) Oral every 4 hours  senna 2 Tablet(s) Oral at bedtime  valproate sodium IVPB 500 milliGRAM(s) IV Intermittent every 8 hours  vancomycin  IVPB 1000 milliGRAM(s) IV Intermittent every 12 hours                            10.4   7.20  )-----------( 327      ( 02 Sep 2020 07:09 )             31.9       Hemoglobin: 10.4 g/dL (09-02 @ 07:09)  Hemoglobin: 10.5 g/dL (09-01 @ 06:43)  Hemoglobin: 9.3 g/dL (08-31 @ 01:32)  Hemoglobin: 11.7 g/dL (08-30 @ 13:05)      09-02    135  |  103  |  13  ----------------------------<  93  3.3<L>   |  25  |  0.80    Ca    8.3<L>      02 Sep 2020 07:09  Phos  3.0     09-02  Mg     2.2     09-02      Creatinine Trend: 0.80<--, 0.61<--, 0.68<--, 0.86<--, 0.78<--, 0.72<--    COAGS:     CARDIAC MARKERS ( 30 Aug 2020 13:05 )  <0.015 ng/mL / x     / x     / x     / x            T(C): 36.8 (09-02-20 @ 11:33), Max: 36.9 (09-02-20 @ 07:34)  HR: 80 (09-02-20 @ 11:33) (66 - 87)  BP: 125/59 (09-02-20 @ 11:33) (125/59 - 164/61)  RR: 18 (09-02-20 @ 11:33) (16 - 19)  SpO2: 97% (09-02-20 @ 11:33) (96% - 99%)  Wt(kg): --    I&O's Summary    01 Sep 2020 07:01  -  02 Sep 2020 07:00  --------------------------------------------------------  IN: 75 mL / OUT: 800 mL / NET: -725 mL        HEENT:  (-)icterus (-)pallor  CV: N S1 S2 1/6 BETHANY (+)2 Pulses B/l  Resp:  Clear to ausculatation B/L, normal effort  GI: (+) BS Soft, NT, ND  Lymph:  (-)Edema, (-)obvious lymphadenopathy  Skin: Warm to touch, Normal turgor  Psych: Appropriate mood and affect      TELEMETRY: 	  OFF        ASSESSMENT/PLAN: 	87y  Female medical history of HTN and ICH (diagnosed on 23rd August)  was sent in from [facility] rehab after a fall urosepsis preserved LV function incidentally noted with NSVT.    - WIll deffer ischemic eval given recent ICH and DNR / DNI status  - no further NSVT Lopressor 25 mg PO BID  - Abx per medical team      Rafiq Brush MD, [facility]  BEEPER [phone]

## 2020-09-02 NOTE — CONSULT NOTE ADULT - NEUROLOGICAL DETAILS
responds to verbal commands/sensation intact/alert and oriented x 3 alert and oriented x 3/responds to verbal commands/sensation intact/cranial nerves intact/normal strength

## 2020-09-02 NOTE — PROGRESS NOTE ADULT - PROBLEM SELECTOR PLAN 2
Temp of 100.7F with   normal WBC  CT chest, abdomen Pelvis and CT head -no significant findings, CT head stable  CXR wnl  Patients UA positive  BCx growing Gram positive cocci  Abx as above  Patient had elevated lactate , trended down to normal.  F/u blood Cx sent 9/2 Blood cx growing Gram positive cocci, f/u repeat blood cx  Abx as above, f/u ID consult with Dr. Mcgill

## 2020-09-02 NOTE — CONSULT NOTE ADULT - NEGATIVE GASTROINTESTINAL SYMPTOMS
no diarrhea/no vomiting/no nausea/no constipation no nausea/no vomiting/no constipation/no diarrhea/no abdominal pain

## 2020-09-02 NOTE — PROGRESS NOTE ADULT - ATTENDING COMMENTS
Patient seen and examined. Patient's history, vitals, labs, imaging studies reviewed. Discussed with above resident, agree with note with edits and educated on the diagnosis and management of above medical conditions. Repeat head CT as above. Antibiotics d/c per ID. Plan of care discussed with patient, and agrees, all questions answered.   Lashawn Howard MD

## 2020-09-02 NOTE — CONSULT NOTE ADULT - ATTENDING COMMENTS
I counseled the patient and family at bedside in Lao about the patient's diagnosis, and the medical treatment to provide relief for her headache.
pt seen and  examined with ID resident

## 2020-09-02 NOTE — CONSULT NOTE ADULT - ASSESSMENT
UTI ?  Gram + positive bacteremia  ICH      PLAN: UTI ? - UCx normal urogenital pb  Gram + positive bacteremia ?- question of contamination  ICH with headache      PLAN:  - D/C Abx  - f/u repeat BCx 9/3 UTI ? - urine culture is negative  Fever - low grade x 1  Bacteremia - question of contamination given 1of 4 bottles and no source but was given abxs without culture being repeated first.        PLAN:  - D/C Abx  - f/u repeat Blood culture 9/3 off abxs for 24 hrs

## 2020-09-02 NOTE — PROGRESS NOTE ADULT - PROBLEM SELECTOR PLAN 4
Patient is on lisinopril and amlodipine at home .  Cw with home meds.  Goal systolic  blood pressure < 140 Patient had a fall at rehab.  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 Continued correlation and follow-up is recommended.  Echo done on August 24, 2020 normal systolic function .  NSVT on tele, asymptomatic  C/w 25 Lopressor BID  Off telemetry now  Fall precautions  Cardiology Dr. Brush K replaced, monitor

## 2020-09-02 NOTE — PROGRESS NOTE ADULT - ASSESSMENT
87 female with medical history of HTN and ICH (diagnosed on 23rd August) was sent in from [facility] rehab after a fall. Patient is AAO X 2 is a very poor historian. History was taken with the help of friend at bed side. Patient was recently discharged after she was diagnosed with ICH bleed to [facility] rehab and there she had a fall. No specific details about fall are known. Patients friend at bed side told us that she got a call from nursing home that patient had a fall and she is being transferred to hospital. Patient complains of abdominal pain and pain while urination associated with fever and chills.    Patient is admitted for Sepsis, Blood cultures growing gram positive cocci on Vanc/Zosyn.

## 2020-09-02 NOTE — CONSULT NOTE ADULT - SUBJECTIVE AND OBJECTIVE BOX
HPI:  87 female with medical history of HTN and ICH (diagnosed on )  was sent in from [facility] rehab after a fall. Patient is AAO X 2 is a very poor historian. History was taken with the help of friend at bedside. Patient was recently discharged after she was diagnosed with ICH bleed to [facility] rehab and there she had a fall. No specific details about fall are known. Patients friend at bedside told us that she got a call from nursing home that patient had a fall and she is being transferred to hospital. Patient complains of abdominal pain and pain while urination associated with fever and chills.    ED: patient had a temp of 100.7F with ,  normal WBC. Patient's UA was sent and CT abdomen Pelvis and CT head was done.    Patients UA is positive for WBC 11-25 with positive nitrites and leukocyte esterase.  Patient urine and blood Cx were sent and she was given a dose of IV Zosyn . (30 Aug 2020 18:33)    ID:     PAST MEDICAL & SURGICAL HISTORY:  Breast cancer: right s/p resection  Essential hypertension  Breast CA  HTN (hypertension)  No significant past surgical history  H/O mastectomy  History of colon resection      No Known Allergies      Meds:  acetaminophen   Tablet .. 650 milliGRAM(s) Oral every 4 hours PRN  amLODIPine   Tablet 10 milliGRAM(s) Oral daily  diphenhydrAMINE 25 milliGRAM(s) Oral every 8 hours  doxazosin 4 milliGRAM(s) Oral at bedtime  ketorolac   Injectable 30 milliGRAM(s) IV Push every 6 hours PRN  lisinopril 40 milliGRAM(s) Oral daily  metoclopramide Injectable 10 milliGRAM(s) IV Push once  metoclopramide Injectable 10 milliGRAM(s) IV Push every 8 hours  metoprolol tartrate 25 milliGRAM(s) Oral two times a day  oxycodone    5 mG/acetaminophen 325 mG 1 Tablet(s) Oral every 4 hours PRN  piperacillin/tazobactam IVPB.. 3.375 Gram(s) IV Intermittent every 8 hours  polyethylene glycol 3350 17 Gram(s) Oral two times a day PRN  potassium chloride    Tablet ER 40 milliEquivalent(s) Oral every 4 hours  senna 2 Tablet(s) Oral at bedtime  valproate sodium IVPB 500 milliGRAM(s) IV Intermittent every 8 hours  vancomycin  IVPB 1000 milliGRAM(s) IV Intermittent every 12 hours      SOCIAL HISTORY:  Smoker:  YES / NO        PACK YEARS:                         WHEN QUIT?  ETOH use:  YES / NO               FREQUENCY / QUANTITY:  Ilicit Drug use:  YES / NO  Occupation:  Assisted device use (Cane / Walker):  Live with:    FAMILY HISTORY:  No pertinent family history in first degree relatives      VITALS:  Vital Signs Last 24 Hrs  T(C): 36.9 (02 Sep 2020 07:34), Max: 37.1 (01 Sep 2020 12:15)  T(F): 98.5 (02 Sep 2020 07:34), Max: 98.8 (01 Sep 2020 12:15)  HR: 80 (02 Sep 2020 07:34) (66 - 94)  BP: 145/69 (02 Sep 2020 07:34) (98/80 - 164/61)  BP(mean): 84 (01 Sep 2020 12:18) (62 - 84)  RR: 18 (02 Sep 2020 07:34) (16 - 19)  SpO2: 97% (02 Sep 2020 07:34) (96% - 100%)    LABS/DIAGNOSTIC TESTS:                          10.4   7.20  )-----------( 327      ( 02 Sep 2020 07:09 )             31.9     WBC Count: 7.20 K/uL ( @ 07:09)  WBC Count: 8.33 K/uL ( @ 06:43)  WBC Count: 9.44 K/uL ( @ 01:32)  WBC Count: 7.39 K/uL ( @ 13:05)          135  |  103  |  13  ----------------------------<  93  3.3<L>   |  25  |  0.80    Ca    8.3<L>      02 Sep 2020 07:09  Phos  3.0       Mg     2.2     02        Urinalysis Basic - ( 01 Sep 2020 21:04 )    Color: Yellow / Appearance: Clear / S.005 / pH: x  Gluc: x / Ketone: Negative  / Bili: Negative / Urobili: Negative   Blood: x / Protein: Negative / Nitrite: Negative   Leuk Esterase: Moderate / RBC: 5-10 /HPF / WBC 6-10 /HPF   Sq Epi: x / Non Sq Epi: Few /HPF / Bacteria: Few /HPF      LACTATE: Lactate, Blood: 1.3 mmol/L (20 @ 14:43)      ABG -     CULTURES:   .Urine Clean Catch (Midstream)   @ 00:34   <10,000 CFU/mL Normal Urogenital Cordelia  --  --      .Blood Blood-Peripheral   @ 16:29   Growth in anaerobic bottle: Gram positive cocci in pairs      RADIOLOGY:    EXAM:  CT ABDOMEN AND PELVIS                          EXAM:  CT CHEST                            PROCEDURE DATE:  2020          INTERPRETATION:  CLINICAL INDICATION: 87 years  Female with fall. Abdominal pain.    COMPARISON: None.    PROCEDURE:  CT of the Chest, Abdomen and Pelvis was performed without intravenous contrast.  Intravenous contrast: None.  Oral contrast: None.  Sagittal and coronal reformats were performed.    LIMITATIONS: Evaluation of the solid organs, vascular structures and GI tract is limited without oral and IV contrast. Motion artifact.    FINDINGS:  CHEST:  LUNGS AND LARGE AIRWAYS: Patent central airways. No pulmonary nodules. Mild bilateral lower lobe discoid atelectasis.  PLEURA: No pleural effusion. No pneumothorax.  VESSELS: Atherosclerotic aorta without aneurysm. Coronary atherosclerosis.  HEART: Mild cardiomegaly. No pericardial effusion.  MEDIASTINUM AND LOAN: No lymphadenopathy. No pneumomediastinum.  CHEST WALL AND LOWER NECK: Asymmetrical thyroid. Right lobe larger than left. Tiny nodules too small to characterize.    ABDOMEN AND PELVIS:  LIVER: Within normal limits.  BILE DUCTS: Normal caliber.  GALLBLADDER: Within normal limits.  SPLEEN: Within normal limits.  PANCREAS: Atrophic.  ADRENALS: Within normal limits.  KIDNEYS/URETERS: Bilateral renal cysts and hypodensities too small to characterize. No hydroureteronephrosis or calculi.    BLADDER: Within normal limits. Limited by right hip hardware.  REPRODUCTIVE ORGANS: Hysterectomy.    BOWEL: No bowel obstruction. Appendix is not visualized. No evidence of inflammation in the pericecal region.. Left mid small bowel anastomotic suture line. Focal small bowel dilatation at the anastomotic suture line measuring up to 4.1 cm. Extensive sigmoid diverticulosis without diverticulitis. Mild rectal fecal impaction. Rectum measures 6 cm.  PERITONEUM: No ascites.  VESSELS: Atherosclerotic aorta. Suprarenal abdominal aorta 3.0 cm. Infrarenal abdominal aorta is normal in caliber at 2.0 cm.  RETROPERITONEUM/LYMPH NODES: No lymphadenopathy.  ABDOMINAL WALL: Diastases of the rectus muscles.  BONES: Scoliosis. Degenerative changes. Severe hypertrophic L4-5 spinal stenosis. Right hip arthroplasty.    IMPRESSION:  Evaluation of the solid organs, vascular structures and GI tract is limited without oral and IV contrast. Motion artifact. No acute intrathoracic or intra-abdominal pathology.    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    Constipated colon with mild rectal fecal impaction. Postsurgical changes. Focal small bowel dilatation at the anastomotic suture line.    Marked sigmoid diverticulosis without diverticulitis.    Severe hypertrophic L4-5 spinal stenosis.          TAMIKA SCHMITT M.D., ATTENDING RADIOLOGIST  Thisdocument has been electronically signed. Aug 30 2020  1:49PM            EXAM:  XR CHEST PORTABLE URGENT 1V                            PROCEDURE DATE:  2020          INTERPRETATION:  INDICATION: fever    PRIORS: 2020.    VIEWS: Portable AP radiography of the chest performed.    FINDINGS: Heart size appears within normal limits. No hilar or superior mediastinal abnormalities are identified. Allowing for shallow inspiration, no evidence for focal infiltrate, lobar consolidation or pulmonary edema. No pleural effusion. No pneumothorax. No mediastinal shift. No acute osseous fractures. Degenerative changes bilateral shoulder region.    IMPRESSION: No evidence for focal infiltrate or lobar consolidation.        TERESA DANG M.D., ATTENDING RADIOLOGIST  This document has been electronically signed.Sep  1 2020  3:58PM        EXAM:  CT BRAIN                            PROCEDURE DATE:  2020          INTERPRETATION:  Clinical Indication: Headache    Comparison: 2020, 2020    Technique: Noncontrast axial CT images of the head were acquired.    Findings: Detailed evaluation is compromised due to severe motion artifact.    Known extensive right posterior temporal, parietal and occipital intraparenchymal hemorrhage is again noted with surrounding edema. Right temporal subdural hemorrhage is partially visualized but difficult to assess interval change due to motion artifact. Interhemispheric subdural hemorrhage is noted with the largest area measuring 3.5 mm, similar to prior. Surgical effacement in the right hemisphere is most prominent in the parieto-occipital region.    Impression: Redemonstration of extensive right temporoparietal occipital hemorrhage, right temporal and interhemispheric subdural hemorrhage without significant interval change.    Similar vasogenic edema and parieto-occipital sulcal effacement.        SUMAN LYNN M.D., ATTENDING RADIOLOGIST  This document has been electronically signed. Sep  1 2020  7:31PM                  ROS  [  ] UNABLE TO ELICIT HPI:  87 female with medical history of HTN and ICH (diagnosed on )  was sent in from [facility] rehab after a fall. Patient is AAO X 2 is a very poor historian. History was taken with the help of friend at bedside. Patient was recently discharged after she was diagnosed with ICH bleed to [facility] rehab and there she had a fall. No specific details about fall are known. Patients friend at bedside told us that she got a call from nursing home that patient had a fall and she is being transferred to hospital. Patient complains of abdominal pain and pain while urination associated with fever and chills.    ED: patient had a temp of 100.7F with ,  normal WBC. Patient's UA was sent and CT abdomen Pelvis and CT head was done.    Patients UA is positive for WBC 11-25 with positive nitrites and leukocyte esterase.  Patient urine and blood Cx were sent and she was given a dose of IV Zosyn . (30 Aug 2020 18:33)    ID: Patient feels improved today. Still complaining of a frontal headache since fall and some anxiety. No fevers on this admission and no leukocytosis. UCx normal urogential pb and BCx + for G positive cocci in pairs.  On IV Zosyn and Vancomycin.    PAST MEDICAL & SURGICAL HISTORY:  Breast cancer: right s/p resection  Essential hypertension  Breast CA  HTN (hypertension)  No significant past surgical history  H/O mastectomy  History of colon resection      No Known Allergies      Meds:  acetaminophen   Tablet .. 650 milliGRAM(s) Oral every 4 hours PRN  amLODIPine   Tablet 10 milliGRAM(s) Oral daily  diphenhydrAMINE 25 milliGRAM(s) Oral every 8 hours  doxazosin 4 milliGRAM(s) Oral at bedtime  ketorolac   Injectable 30 milliGRAM(s) IV Push every 6 hours PRN  lisinopril 40 milliGRAM(s) Oral daily  metoclopramide Injectable 10 milliGRAM(s) IV Push once  metoclopramide Injectable 10 milliGRAM(s) IV Push every 8 hours  metoprolol tartrate 25 milliGRAM(s) Oral two times a day  oxycodone    5 mG/acetaminophen 325 mG 1 Tablet(s) Oral every 4 hours PRN  piperacillin/tazobactam IVPB.. 3.375 Gram(s) IV Intermittent every 8 hours  polyethylene glycol 3350 17 Gram(s) Oral two times a day PRN  potassium chloride    Tablet ER 40 milliEquivalent(s) Oral every 4 hours  senna 2 Tablet(s) Oral at bedtime  valproate sodium IVPB 500 milliGRAM(s) IV Intermittent every 8 hours  vancomycin  IVPB 1000 milliGRAM(s) IV Intermittent every 12 hours      SOCIAL HISTORY:  Smoker:  YES / NO          ETOH use:  YES / NO                  FAMILY HISTORY:  No pertinent family history in first degree relatives      VITALS:  Vital Signs Last 24 Hrs  T(C): 36.9 (02 Sep 2020 07:34), Max: 37.1 (01 Sep 2020 12:15)  T(F): 98.5 (02 Sep 2020 07:34), Max: 98.8 (01 Sep 2020 12:15)  HR: 80 (02 Sep 2020 07:34) (66 - 94)  BP: 145/69 (02 Sep 2020 07:34) (98/80 - 164/61)  BP(mean): 84 (01 Sep 2020 12:18) (62 - 84)  RR: 18 (02 Sep 2020 07:34) (16 - 19)  SpO2: 97% (02 Sep 2020 07:34) (96% - 100%)    LABS/DIAGNOSTIC TESTS:                          10.4   7.20  )-----------( 327      ( 02 Sep 2020 07:09 )             31.9     WBC Count: 7.20 K/uL ( @ 07:09)  WBC Count: 8.33 K/uL ( @ 06:43)  WBC Count: 9.44 K/uL ( @ 01:32)  WBC Count: 7.39 K/uL ( @ 13:05)          135  |  103  |  13  ----------------------------<  93  3.3<L>   |  25  |  0.80    Ca    8.3<L>      02 Sep 2020 07:09  Phos  3.0       Mg     2.2     02        Urinalysis Basic - ( 01 Sep 2020 21:04 )    Color: Yellow / Appearance: Clear / S.005 / pH: x  Gluc: x / Ketone: Negative  / Bili: Negative / Urobili: Negative   Blood: x / Protein: Negative / Nitrite: Negative   Leuk Esterase: Moderate / RBC: 5-10 /HPF / WBC 6-10 /HPF   Sq Epi: x / Non Sq Epi: Few /HPF / Bacteria: Few /HPF      LACTATE: Lactate, Blood: 1.3 mmol/L (20 @ 14:43)      ABG -     CULTURES:   .Urine Clean Catch (Midstream)   @ 00:34   <10,000 CFU/mL Normal Urogenital Pb  --  --      .Blood Blood-Peripheral   @ 16:29   Growth in anaerobic bottle: Gram positive cocci in pairs      RADIOLOGY:    EXAM:  CT ABDOMEN AND PELVIS                          EXAM:  CT CHEST                            PROCEDURE DATE:  2020          INTERPRETATION:  CLINICAL INDICATION: 87 years  Female with fall. Abdominal pain.    COMPARISON: None.    PROCEDURE:  CT of the Chest, Abdomen and Pelvis was performed without intravenous contrast.  Intravenous contrast: None.  Oral contrast: None.  Sagittal and coronal reformats were performed.    LIMITATIONS: Evaluation of the solid organs, vascular structures and GI tract is limited without oral and IV contrast. Motion artifact.    FINDINGS:  CHEST:  LUNGS AND LARGE AIRWAYS: Patent central airways. No pulmonary nodules. Mild bilateral lower lobe discoid atelectasis.  PLEURA: No pleural effusion. No pneumothorax.  VESSELS: Atherosclerotic aorta without aneurysm. Coronary atherosclerosis.  HEART: Mild cardiomegaly. No pericardial effusion.  MEDIASTINUM AND LOAN: No lymphadenopathy. No pneumomediastinum.  CHEST WALL AND LOWER NECK: Asymmetrical thyroid. Right lobe larger than left. Tiny nodules too small to characterize.    ABDOMEN AND PELVIS:  LIVER: Within normal limits.  BILE DUCTS: Normal caliber.  GALLBLADDER: Within normal limits.  SPLEEN: Within normal limits.  PANCREAS: Atrophic.  ADRENALS: Within normal limits.  KIDNEYS/URETERS: Bilateral renal cysts and hypodensities too small to characterize. No hydroureteronephrosis or calculi.    BLADDER: Within normal limits. Limited by right hip hardware.  REPRODUCTIVE ORGANS: Hysterectomy.    BOWEL: No bowel obstruction. Appendix is not visualized. No evidence of inflammation in the pericecal region.. Left mid small bowel anastomotic suture line. Focal small bowel dilatation at the anastomotic suture line measuring up to 4.1 cm. Extensive sigmoid diverticulosis without diverticulitis. Mild rectal fecal impaction. Rectum measures 6 cm.  PERITONEUM: No ascites.  VESSELS: Atherosclerotic aorta. Suprarenal abdominal aorta 3.0 cm. Infrarenal abdominal aorta is normal in caliber at 2.0 cm.  RETROPERITONEUM/LYMPH NODES: No lymphadenopathy.  ABDOMINAL WALL: Diastases of the rectus muscles.  BONES: Scoliosis. Degenerative changes. Severe hypertrophic L4-5 spinal stenosis. Right hip arthroplasty.    IMPRESSION:  Evaluation of the solid organs, vascular structures and GI tract is limited without oral and IV contrast. Motion artifact. No acute intrathoracic or intra-abdominal pathology.    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    Constipated colon with mild rectal fecal impaction. Postsurgical changes. Focal small bowel dilatation at the anastomotic suture line.    Marked sigmoid diverticulosis without diverticulitis.    Severe hypertrophic L4-5 spinal stenosis.          TAMIKA SCHMITT M.D., ATTENDING RADIOLOGIST  Thisdocument has been electronically signed. Aug 30 2020  1:49PM            EXAM:  XR CHEST PORTABLE URGENT 1V                            PROCEDURE DATE:  2020          INTERPRETATION:  INDICATION: fever    PRIORS: 2020.    VIEWS: Portable AP radiography of the chest performed.    FINDINGS: Heart size appears within normal limits. No hilar or superior mediastinal abnormalities are identified. Allowing for shallow inspiration, no evidence for focal infiltrate, lobar consolidation or pulmonary edema. No pleural effusion. No pneumothorax. No mediastinal shift. No acute osseous fractures. Degenerative changes bilateral shoulder region.    IMPRESSION: No evidence for focal infiltrate or lobar consolidation.        TERESA DANG M.D., ATTENDING RADIOLOGIST  This document has been electronically signed.Sep  1 2020  3:58PM        EXAM:  CT BRAIN                            PROCEDURE DATE:  2020          INTERPRETATION:  Clinical Indication: Headache    Comparison: 2020, 2020    Technique: Noncontrast axial CT images of the head were acquired.    Findings: Detailed evaluation is compromised due to severe motion artifact.    Known extensive right posterior temporal, parietal and occipital intraparenchymal hemorrhage is again noted with surrounding edema. Right temporal subdural hemorrhage is partially visualized but difficult to assess interval change due to motion artifact. Interhemispheric subdural hemorrhage is noted with the largest area measuring 3.5 mm, similar to prior. Surgical effacement in the right hemisphere is most prominent in the parieto-occipital region.    Impression: Redemonstration of extensive right temporoparietal occipital hemorrhage, right temporal and interhemispheric subdural hemorrhage without significant interval change.    Similar vasogenic edema and parieto-occipital sulcal effacement.        SUMAN LYNN M.D., ATTENDING RADIOLOGIST  This document has been electronically signed. Sep  1 2020  7:31PM                  ROS  [  ] UNABLE TO ELICIT HPI:  87 female with medical history of HTN and ICH (diagnosed on )  was sent in from [facility] rehab after a fall. Patient is AAO X 2 is a very poor historian. History was taken with the help of friend at bedside. Patient was recently discharged after she was diagnosed with ICH bleed to [facility] rehab and there she had a fall. No specific details about fall are known. Patients friend at bedside told us that she got a call from nursing home that patient had a fall and she is being transferred to hospital. Patient complains of abdominal pain and pain while urination associated with fever and chills.    ED: patient had a temp of 100.7F with ,  normal WBC. Patient's UA was sent and CT abdomen Pelvis and CT head was done.    Patients UA is positive for WBC 11-25 with positive nitrites and leukocyte esterase.  Patient urine and blood Cx were sent and she was given a dose of IV Zosyn . (30 Aug 2020 18:33)    ID: Patient feels improved today. Still complaining of a frontal headache since fall and some anxiety. No fevers on this admission and no leukocytosis. UCx normal urogential pb and BCx + for G positive cocci in pairs.  On IV Zosyn and Vancomycin.    PAST MEDICAL & SURGICAL HISTORY:  Breast cancer: right s/p resection  Essential hypertension  Breast CA  HTN (hypertension)  No significant past surgical history  H/O mastectomy  History of colon resection      No Known Allergies      Meds:  acetaminophen   Tablet .. 650 milliGRAM(s) Oral every 4 hours PRN  amLODIPine   Tablet 10 milliGRAM(s) Oral daily  diphenhydrAMINE 25 milliGRAM(s) Oral every 8 hours  doxazosin 4 milliGRAM(s) Oral at bedtime  ketorolac   Injectable 30 milliGRAM(s) IV Push every 6 hours PRN  lisinopril 40 milliGRAM(s) Oral daily  metoclopramide Injectable 10 milliGRAM(s) IV Push once  metoclopramide Injectable 10 milliGRAM(s) IV Push every 8 hours  metoprolol tartrate 25 milliGRAM(s) Oral two times a day  oxycodone    5 mG/acetaminophen 325 mG 1 Tablet(s) Oral every 4 hours PRN  piperacillin/tazobactam IVPB.. 3.375 Gram(s) IV Intermittent every 8 hours  polyethylene glycol 3350 17 Gram(s) Oral two times a day PRN  potassium chloride    Tablet ER 40 milliEquivalent(s) Oral every 4 hours  senna 2 Tablet(s) Oral at bedtime  valproate sodium IVPB 500 milliGRAM(s) IV Intermittent every 8 hours  vancomycin  IVPB 1000 milliGRAM(s) IV Intermittent every 12 hours      SOCIAL HISTORY:  Smoker:  NO          ETOH use: NO                  FAMILY HISTORY:  No pertinent family history in first degree relatives      VITALS:  Vital Signs Last 24 Hrs  T(C): 36.9 (02 Sep 2020 07:34), Max: 37.1 (01 Sep 2020 12:15)  T(F): 98.5 (02 Sep 2020 07:34), Max: 98.8 (01 Sep 2020 12:15)  HR: 80 (02 Sep 2020 07:34) (66 - 94)  BP: 145/69 (02 Sep 2020 07:34) (98/80 - 164/61)  BP(mean): 84 (01 Sep 2020 12:18) (62 - 84)  RR: 18 (02 Sep 2020 07:34) (16 - 19)  SpO2: 97% (02 Sep 2020 07:34) (96% - 100%)    LABS/DIAGNOSTIC TESTS:                          10.4   7.20  )-----------( 327      ( 02 Sep 2020 07:09 )             31.9     WBC Count: 7.20 K/uL ( @ 07:09)  WBC Count: 8.33 K/uL ( @ 06:43)  WBC Count: 9.44 K/uL ( @ 01:32)  WBC Count: 7.39 K/uL ( @ 13:05)          135  |  103  |  13  ----------------------------<  93  3.3<L>   |  25  |  0.80    Ca    8.3<L>      02 Sep 2020 07:09  Phos  3.0       Mg     2.2     02        Urinalysis Basic - ( 01 Sep 2020 21:04 )    Color: Yellow / Appearance: Clear / S.005 / pH: x  Gluc: x / Ketone: Negative  / Bili: Negative / Urobili: Negative   Blood: x / Protein: Negative / Nitrite: Negative   Leuk Esterase: Moderate / RBC: 5-10 /HPF / WBC 6-10 /HPF   Sq Epi: x / Non Sq Epi: Few /HPF / Bacteria: Few /HPF      LACTATE: Lactate, Blood: 1.3 mmol/L (20 @ 14:43)      ABG -     CULTURES:   .Urine Clean Catch (Midstream)   @ 00:34   <10,000 CFU/mL Normal Urogenital Pb  --  --      .Blood Blood-Peripheral   @ 16:29   Growth in anaerobic bottle: Gram positive cocci in pairs      RADIOLOGY:    EXAM:  CT ABDOMEN AND PELVIS                          EXAM:  CT CHEST                            PROCEDURE DATE:  2020          INTERPRETATION:  CLINICAL INDICATION: 87 years  Female with fall. Abdominal pain.    COMPARISON: None.    PROCEDURE:  CT of the Chest, Abdomen and Pelvis was performed without intravenous contrast.  Intravenous contrast: None.  Oral contrast: None.  Sagittal and coronal reformats were performed.    LIMITATIONS: Evaluation of the solid organs, vascular structures and GI tract is limited without oral and IV contrast. Motion artifact.    FINDINGS:  CHEST:  LUNGS AND LARGE AIRWAYS: Patent central airways. No pulmonary nodules. Mild bilateral lower lobe discoid atelectasis.  PLEURA: No pleural effusion. No pneumothorax.  VESSELS: Atherosclerotic aorta without aneurysm. Coronary atherosclerosis.  HEART: Mild cardiomegaly. No pericardial effusion.  MEDIASTINUM AND LOAN: No lymphadenopathy. No pneumomediastinum.  CHEST WALL AND LOWER NECK: Asymmetrical thyroid. Right lobe larger than left. Tiny nodules too small to characterize.    ABDOMEN AND PELVIS:  LIVER: Within normal limits.  BILE DUCTS: Normal caliber.  GALLBLADDER: Within normal limits.  SPLEEN: Within normal limits.  PANCREAS: Atrophic.  ADRENALS: Within normal limits.  KIDNEYS/URETERS: Bilateral renal cysts and hypodensities too small to characterize. No hydroureteronephrosis or calculi.    BLADDER: Within normal limits. Limited by right hip hardware.  REPRODUCTIVE ORGANS: Hysterectomy.    BOWEL: No bowel obstruction. Appendix is not visualized. No evidence of inflammation in the pericecal region.. Left mid small bowel anastomotic suture line. Focal small bowel dilatation at the anastomotic suture line measuring up to 4.1 cm. Extensive sigmoid diverticulosis without diverticulitis. Mild rectal fecal impaction. Rectum measures 6 cm.  PERITONEUM: No ascites.  VESSELS: Atherosclerotic aorta. Suprarenal abdominal aorta 3.0 cm. Infrarenal abdominal aorta is normal in caliber at 2.0 cm.  RETROPERITONEUM/LYMPH NODES: No lymphadenopathy.  ABDOMINAL WALL: Diastases of the rectus muscles.  BONES: Scoliosis. Degenerative changes. Severe hypertrophic L4-5 spinal stenosis. Right hip arthroplasty.    IMPRESSION:  Evaluation of the solid organs, vascular structures and GI tract is limited without oral and IV contrast. Motion artifact. No acute intrathoracic or intra-abdominal pathology.    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    Constipated colon with mild rectal fecal impaction. Postsurgical changes. Focal small bowel dilatation at the anastomotic suture line.    Marked sigmoid diverticulosis without diverticulitis.    Severe hypertrophic L4-5 spinal stenosis.          TAMIKA SCHMITT M.D., ATTENDING RADIOLOGIST  Thisdocument has been electronically signed. Aug 30 2020  1:49PM            EXAM:  XR CHEST PORTABLE URGENT 1V                            PROCEDURE DATE:  2020          INTERPRETATION:  INDICATION: fever    PRIORS: 2020.    VIEWS: Portable AP radiography of the chest performed.    FINDINGS: Heart size appears within normal limits. No hilar or superior mediastinal abnormalities are identified. Allowing for shallow inspiration, no evidence for focal infiltrate, lobar consolidation or pulmonary edema. No pleural effusion. No pneumothorax. No mediastinal shift. No acute osseous fractures. Degenerative changes bilateral shoulder region.    IMPRESSION: No evidence for focal infiltrate or lobar consolidation.        TERESA DANG M.D., ATTENDING RADIOLOGIST  This document has been electronically signed.Sep  1 2020  3:58PM        EXAM:  CT BRAIN                            PROCEDURE DATE:  2020          INTERPRETATION:  Clinical Indication: Headache    Comparison: 2020, 2020    Technique: Noncontrast axial CT images of the head were acquired.    Findings: Detailed evaluation is compromised due to severe motion artifact.    Known extensive right posterior temporal, parietal and occipital intraparenchymal hemorrhage is again noted with surrounding edema. Right temporal subdural hemorrhage is partially visualized but difficult to assess interval change due to motion artifact. Interhemispheric subdural hemorrhage is noted with the largest area measuring 3.5 mm, similar to prior. Surgical effacement in the right hemisphere is most prominent in the parieto-occipital region.    Impression: Redemonstration of extensive right temporoparietal occipital hemorrhage, right temporal and interhemispheric subdural hemorrhage without significant interval change.    Similar vasogenic edema and parieto-occipital sulcal effacement.        SUMAN LYNN M.D., ATTENDING RADIOLOGIST  This document has been electronically signed. Sep  1 2020  7:31PM                  ROS  [  ] UNABLE TO ELICIT

## 2020-09-02 NOTE — CONSULT NOTE ADULT - GASTROINTESTINAL DETAILS
no rigidity/no guarding/no rebound tenderness/bowel sounds normal/nontender/soft no organomegaly/no rebound tenderness/no rigidity/nontender/no masses palpable/bowel sounds normal/soft/no guarding/no distention

## 2020-09-02 NOTE — PROGRESS NOTE ADULT - PROBLEM SELECTOR PLAN 3
Patient had a fall at rehab.  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 Continued correlation and follow-up is recommended.  Echo done on August 24, 2020 normal systolic function .  NSVT on tele, asymptomatic  C/w 25 Lopressor BID  Off telemetry now  Fall precautions  Cardiology Dr. Brush Temp of 100.7F with   normal WBC  CT chest, abdomen Pelvis and CT head -no significant findings, CT head stable  CXR wnl  Patients UA positive  BCx growing Gram positive cocci  Abx as above  Patient had elevated lactate , trended down to normal.  F/u blood Cx sent 9/2

## 2020-09-02 NOTE — PROGRESS NOTE ADULT - PROBLEM SELECTOR PLAN 1
Patients UA is positive for WBC 11-25 with positive nitrites and leukocyte esterase.  UCx negative  On Ctx 8/30-9/1  Vanc/Zosyn 9/1-   ID Dr. Mcgill

## 2020-09-02 NOTE — PROGRESS NOTE ADULT - SUBJECTIVE AND OBJECTIVE BOX
PGY 1 Note discussed with supervising resident and primary attending  PGY-1: Nehal Covarrubias MD  PAGER #: [phone] TILL 5:00 PM  Please contact On Call team 5PM - 8:30PM  Please contact Nightfloat team 8:30PM - 7:30AM  Patient is a 87y old  Female who presents with a chief complaint of Sepsis/ Fall (02 Sep 2020 09:26)    Brief [facility] Course: 87 female with medical history of HTN and ICH (diagnosed on )  was sent in [facility] after a [facility]. Patient is AAO X 2 is a very poor historian. History was taken with the help of friend at bedside. Patient was recently discharged after she was diagnosed with ICH bleed to [facility] rehab and there she had a fall. No specific details about fall are known. Patients friend at bedside told us that she got a call from nursing home that patient had a fall and she is being transferred to hospital. Patient complains of abdominal pain and pain while urination associated with fever and chills.    ED: patient had a temp of 100.7F with ,  normal WBC. Patient's UA was sent and CT abdomen Pelvis and CT head was done.    Patients UA is positive, had mild fever yesterday with BCx growing gram positive cocci, abx escalated to Vanc/Zosyn.    INTERVAL HPI/OVERNIGHT EVENTS: Pt c/o headache, CTH showed no interval change. Started on migraine cocktail - reglan, benadryl depakote. Still c/o of headache this AM, especially in R forehead. Also c/o of being cold. Denies abdominal pain and dysuria. Vital signs wnl. BCx growing gram positive cocci, repeat sent this AM. On IV Vanc/zosyn. ID to follow.     MEDICATIONS  (STANDING):  amLODIPine   Tablet 10 milliGRAM(s) Oral daily  diphenhydrAMINE 25 milliGRAM(s) Oral every 8 hours  doxazosin 4 milliGRAM(s) Oral at bedtime  lisinopril 40 milliGRAM(s) Oral daily  metoclopramide Injectable 10 milliGRAM(s) IV Push once  metoclopramide Injectable 10 milliGRAM(s) IV Push every 8 hours  metoprolol tartrate 25 milliGRAM(s) Oral two times a day  piperacillin/tazobactam IVPB.. 3.375 Gram(s) IV Intermittent every 8 hours  potassium chloride    Tablet ER 40 milliEquivalent(s) Oral every 4 hours  senna 2 Tablet(s) Oral at bedtime  valproate sodium IVPB 500 milliGRAM(s) IV Intermittent every 8 hours  vancomycin  IVPB 1000 milliGRAM(s) IV Intermittent every 12 hours    MEDICATIONS  (PRN):  acetaminophen   Tablet .. 650 milliGRAM(s) Oral every 4 hours PRN Mild Pain (1 - 3)  ketorolac   Injectable 30 milliGRAM(s) IV Push every 6 hours PRN Severe Pain (7 - 10)  oxycodone    5 mG/acetaminophen 325 mG 1 Tablet(s) Oral every 4 hours PRN Moderate Pain (4 - 6)  polyethylene glycol 3350 17 Gram(s) Oral two times a day PRN Constipation      __________________________________________________  REVIEW OF SYSTEMS:  CONSTITUTIONAL: +chills No fever, weight loss, or fatigue  RESPIRATORY: No cough, wheezing,  or hemoptysis; No shortness of breath  CARDIOVASCULAR: No chest pain, palpitations, dizziness, or leg swelling  GASTROINTESTINAL: No abdominal pain. No nausea, vomiting, or hematemesis; No diarrhea or constipation. No melena or hematochezia.  GENITOURINARY: No dysuria or hematuria, no burning micturition, no polyuria, no urinary hesitancy, no nocturia, normal urinary frequency  NEUROLOGICAL: +headache No memory loss, loss of strength, numbness, or tremors  SKIN: No itching, burning, rashes, or lesions   +anxiety  All other ROS negative except noted above    Vital Signs Last 24 Hrs  T(C): 36.9 (02 Sep 2020 07:34), Max: 37.1 (01 Sep 2020 12:15)  T(F): 98.5 (02 Sep 2020 07:34), Max: 98.8 (01 Sep 2020 12:15)  HR: 80 (02 Sep 2020 07:34) (66 - 94)  BP: 145/69 (02 Sep 2020 07:34) (98/80 - 164/61)  BP(mean): 84 (01 Sep 2020 12:18) (62 - 84)  RR: 18 (02 Sep 2020 07:34) (16 - 19)  SpO2: 97% (02 Sep 2020 07:34) (96% - 100%)    ________________________________________________  PHYSICAL EXAMINATION:  GENERAL: NAD, well-developed  HEAD:  Bruise over left eye, Normocephalic  EYES:  conjunctiva and sclera clear  NECK: Supple, No JVD, Normal thyroid  CHEST/LUNG: Clear to auscultation bilaterally; No rales, rhonchi, wheezing, or rubs  HEART: Regular rate and rhythm; No murmurs, rubs, or gallops  ABDOMEN: Soft, Nontender, Nondistended; Bowel sounds present  NERVOUS SYSTEM:  Alert & Oriented X3,  Moving all 4 extremities  EXTREMITIES:  Peripheral pulses palpable. No clubbing, cyanosis, or edema  SKIN: Warm, Dry, no rashes or lesions    _________________________________________________  LABS:                        10.4   7.20  )-----------( 327      ( 02 Sep 2020 07:09 )             31.9     09-02    135  |  103  |  13  ----------------------------<  93  3.3<L>   |  25  |  0.80    Ca    8.3<L>      02 Sep 2020 07:09  Phos  3.0     09-02  Mg     2.2     09-02        Urinalysis Basic - ( 01 Sep 2020 21:04 )    Color: Yellow / Appearance: Clear / S.005 / pH: x  Gluc: x / Ketone: Negative  / Bili: Negative / Urobili: Negative   Blood: x / Protein: Negative / Nitrite: Negative   Leuk Esterase: Moderate / RBC: 5-10 /HPF / WBC 6-10 /HPF   Sq Epi: x / Non Sq Epi: Few /HPF / Bacteria: Few /HPF      CAPILLARY BLOOD GLUCOSE            RADIOLOGY & ADDITIONAL TESTS:  < from: CT Head No Cont (20 @ 19:03) >  INTERPRETATION:  Clinical Indication: Headache    Comparison: 2020, 2020    Technique: Noncontrast axial CT images of the head were acquired.    Findings: Detailed evaluation is compromised due to severe motion artifact.    Known extensive right posterior temporal, parietal and occipital intraparenchymal hemorrhage is again noted with surrounding edema. Right temporal subdural hemorrhage is partially visualized but difficult to assess interval change due to motion artifact. Interhemispheric subdural hemorrhage is noted with the largest area measuring 3.5 mm, similar to prior. Surgical effacement in the right hemisphere is most prominent in the parieto-occipital region.    Impression: Redemonstration of extensive right temporoparietal occipital hemorrhage, right temporal and interhemispheric subdural hemorrhage without significant interval change.    Similar vasogenic edema and parieto-occipital sulcal effacement.    < end of copied text >  < from: Xray Chest 1 View- PORTABLE-Urgent (20 @ 15:55) >  NTERPRETATION:  INDICATION: fever    PRIORS: 2020.    VIEWS: Portable AP radiography of the chest performed.    FINDINGS: Heart size appears within normal limits. No hilar or superior mediastinal abnormalities are identified. Allowing for shallow inspiration, no evidence for focal infiltrate, lobar consolidation or pulmonary edema. No pleural effusion. No pneumothorax. No mediastinal shift. No acute osseous fractures. Degenerative changes bilateral shoulder region.    IMPRESSION: No evidence for focal infiltrate or lobar consolidation.    < end of copied text >    Imaging Personally Reviewed:  YES    Consultant(s) Notes Reviewed:   YES    Care Discussed with Consultants : YES     Plan of care was discussed with patient and /or primary care giver; all questions and concerns were addressed and care was aligned with patient's wishes. PGY 1 Note discussed with supervising resident and primary attending  PGY-1: Nehal Covarrubias MD  PAGER #: [phone] TILL 5:00 PM  Please contact On Call team 5PM - 8:30PM  Please contact Nightfloat team 8:30PM - 7:30AM  Patient is a 87 year old  Female who presents with a chief complaint of Sepsis/ Fall (02 Sep 2020 09:26)    Brief [facility] Course: 87 year old female with medical history of HTN and ICH (diagnosed on )  [facility]. Patient is AAO X 2 is a very poor historian. History was taken with the help of friend at bedside. Patient was recently discharged after she was diagnosed with ICH bleed to [facility] rehab and there she had a fall. No specific details about fall are known. Patients friend at bedside told us that she got a call from nursing home that patient had a fall and she is being transferred to hospital. Patient complains of abdominal pain and pain while urination associated with fever and chills.    ED: patient had a temp of 100.7F with ,  normal WBC. Patient's UA was sent and CT abdomen Pelvis and CT head was done.    Patients UA is positive, had mild fever yesterday with BCx growing gram positive cocci, abx escalated to Vanc/Zosyn.    INTERVAL HPI/OVERNIGHT EVENTS: Pt c/o headache, CTH showed no interval change. Started on migraine cocktail - reglan, benadryl depakote. Still c/o of headache this AM, especially in R forehead. Also c/o of being cold. Denies abdominal pain and dysuria. Vital signs wnl. BCx growing gram positive cocci, repeat sent this AM. On IV Vanc/zosyn. ID to follow.     MEDICATIONS  (STANDING):  amLODIPine   Tablet 10 milliGRAM(s) Oral daily  diphenhydrAMINE 25 milliGRAM(s) Oral every 8 hours  doxazosin 4 milliGRAM(s) Oral at bedtime  lisinopril 40 milliGRAM(s) Oral daily  metoclopramide Injectable 10 milliGRAM(s) IV Push once  metoclopramide Injectable 10 milliGRAM(s) IV Push every 8 hours  metoprolol tartrate 25 milliGRAM(s) Oral two times a day  piperacillin/tazobactam IVPB.. 3.375 Gram(s) IV Intermittent every 8 hours  potassium chloride    Tablet ER 40 milliEquivalent(s) Oral every 4 hours  senna 2 Tablet(s) Oral at bedtime  valproate sodium IVPB 500 milliGRAM(s) IV Intermittent every 8 hours  vancomycin  IVPB 1000 milliGRAM(s) IV Intermittent every 12 hours    MEDICATIONS  (PRN):  acetaminophen   Tablet .. 650 milliGRAM(s) Oral every 4 hours PRN Mild Pain (1 - 3)  ketorolac   Injectable 30 milliGRAM(s) IV Push every 6 hours PRN Severe Pain (7 - 10)  oxycodone    5 mG/acetaminophen 325 mG 1 Tablet(s) Oral every 4 hours PRN Moderate Pain (4 - 6)  polyethylene glycol 3350 17 Gram(s) Oral two times a day PRN Constipation      __________________________________________________  REVIEW OF SYSTEMS:  CONSTITUTIONAL: +chills No fever, weight loss, or fatigue  RESPIRATORY: No cough, wheezing,  or hemoptysis; No shortness of breath  CARDIOVASCULAR: No chest pain, palpitations, dizziness, or leg swelling  GASTROINTESTINAL: No abdominal pain. No nausea, vomiting, or hematemesis; No diarrhea or constipation. No melena or hematochezia.  GENITOURINARY: No dysuria or hematuria, no burning micturition, no polyuria, no urinary hesitancy, no nocturia, normal urinary frequency  NEUROLOGICAL: +headache No memory loss, loss of strength, numbness, or tremors  SKIN: No itching, burning, rashes, or lesions   +anxiety  All other ROS negative except noted above    Vital Signs Last 24 Hrs  T(C): 36.9 (02 Sep 2020 07:34), Max: 37.1 (01 Sep 2020 12:15)  T(F): 98.5 (02 Sep 2020 07:34), Max: 98.8 (01 Sep 2020 12:15)  HR: 80 (02 Sep 2020 07:34) (66 - 94)  BP: 145/69 (02 Sep 2020 07:34) (98/80 - 164/61)  BP(mean): 84 (01 Sep 2020 12:18) (62 - 84)  RR: 18 (02 Sep 2020 07:34) (16 - 19)  SpO2: 97% (02 Sep 2020 07:34) (96% - 100%)    ________________________________________________  PHYSICAL EXAMINATION:  GENERAL: NAD, well-developed  HEAD:  Bruise over left eye, Normocephalic  EYES:  conjunctiva and sclera clear  NECK: Supple, No JVD, Normal thyroid  CHEST/LUNG: Clear to auscultation bilaterally; No rales, rhonchi, wheezing, or rubs  HEART: Regular rate and rhythm; No murmurs, rubs, or gallops  ABDOMEN: Soft, Nontender, Nondistended; Bowel sounds present  NERVOUS SYSTEM:  Alert & Oriented X3,  Moving all 4 extremities  EXTREMITIES:  Peripheral pulses palpable. No clubbing, cyanosis, or edema  SKIN: Warm, Dry, no rashes or lesions    _________________________________________________  LABS:                        10.4   7.20  )-----------( 327      ( 02 Sep 2020 07:09 )             31.9     09-02    135  |  103  |  13  ----------------------------<  93  3.3<L>   |  25  |  0.80    Ca    8.3<L>      02 Sep 2020 07:09  Phos  3.0     09-02  Mg     2.2     09-02        Urinalysis Basic - ( 01 Sep 2020 21:04 )    Color: Yellow / Appearance: Clear / S.005 / pH: x  Gluc: x / Ketone: Negative  / Bili: Negative / Urobili: Negative   Blood: x / Protein: Negative / Nitrite: Negative   Leuk Esterase: Moderate / RBC: 5-10 /HPF / WBC 6-10 /HPF   Sq Epi: x / Non Sq Epi: Few /HPF / Bacteria: Few /HPF        RADIOLOGY & ADDITIONAL TESTS:  < from: CT Head No Cont (20 @ 19:03) >  INTERPRETATION:  Clinical Indication: Headache    Comparison: 2020, 2020    Technique: Noncontrast axial CT images of the head were acquired.    Findings: Detailed evaluation is compromised due to severe motion artifact.    Known extensive right posterior temporal, parietal and occipital intraparenchymal hemorrhage is again noted with surrounding edema. Right temporal subdural hemorrhage is partially visualized but difficult to assess interval change due to motion artifact. Interhemispheric subdural hemorrhage is noted with the largest area measuring 3.5 mm, similar to prior. Surgical effacement in the right hemisphere is most prominent in the parieto-occipital region.    Impression: Redemonstration of extensive right temporoparietal occipital hemorrhage, right temporal and interhemispheric subdural hemorrhage without significant interval change.    Similar vasogenic edema and parieto-occipital sulcal effacement.    < end of copied text >  < from: Xray Chest 1 View- PORTABLE-Urgent (20 @ 15:55) >  NTERPRETATION:  INDICATION: fever    PRIORS: 2020.    VIEWS: Portable AP radiography of the chest performed.    FINDINGS: Heart size appears within normal limits. No hilar or superior mediastinal abnormalities are identified. Allowing for shallow inspiration, no evidence for focal infiltrate, lobar consolidation or pulmonary edema. No pleural effusion. No pneumothorax. No mediastinal shift. No acute osseous fractures. Degenerative changes bilateral shoulder region.    IMPRESSION: No evidence for focal infiltrate or lobar consolidation.    < end of copied text >    Imaging  Reviewed:  YES    Consultant(s) Notes Reviewed:   YES    Care Discussed with Consultants : YES     Plan of care was discussed with patient and /or primary care giver; all questions and concerns were addressed and care was aligned with patient's wishes.

## 2020-09-02 NOTE — PROGRESS NOTE ADULT - PROBLEM SELECTOR PLAN 6
RISK                                                          Points  [] Previous VTE                                           3  [] Thrombophilia                                        2  [] Lower limb paralysis                              2   [] Current Cancer                                       2   [x] Immobilization > 24 hrs                        1  [] ICU/CCU stay > 24 hours                       1  [x] Age > 60                                                   1  Improve Score - 2  holding off chemo prophylaxis for DVT for recent ICH, jordan continue with SCDs  GI ppx: Not indicated  Diet: Soft, f/u s/s eval  Electrolytes replaced PRN  Dispo: [facility]  DNR/DNI MOLST in chart Patient has hx of ICH, c/o headache no neurologic deficits  CT head showed CT head was done it is showing stable Intra cranial bleed compared to previous admission,   CT head repeated for headache, still stable  Goal Bp < 140  Migraine cocktail q8h - Depakote, Benadryl, Reglan  Neurology consult Dr. Linares Patient is on lisinopril and amlodipine at home .  Cw with home meds.  Goal systolic  blood pressure < 140

## 2020-09-02 NOTE — PROGRESS NOTE ADULT - PROBLEM SELECTOR PLAN 7
RISK                                                          Points  [] Previous VTE                                           3  [] Thrombophilia                                        2  [] Lower limb paralysis                              2   [] Current Cancer                                       2   [x] Immobilization > 24 hrs                        1  [] ICU/CCU stay > 24 hours                       1  [x] Age > 60                                                   1  Improve Score - 2  holding off chemo prophylaxis for DVT for recent ICH, jordan continue with SCDs  GI ppx: Not indicated  Diet: Soft, f/u s/s eval  Electrolytes replaced PRN  Dispo: [facility]  DNR/DNI MOLST in chart Patient has hx of ICH, c/o headache no neurologic deficits  CT head showed CT head was done it is showing stable Intra cranial bleed compared to previous admission,   CT head repeated for headache, still stable  Goal Bp < 140  Migraine cocktail q8h - Depakote, Benadryl, Reglan  Neurology consult Dr. Linares

## 2020-09-02 NOTE — PROGRESS NOTE ADULT - PROBLEM SELECTOR PLAN 5
Patient has hx of ICH, c/o headache no neurologic deficits  CT head showed CT head was done it is showing stable Intra cranial bleed compared to previous admission,   CT head repeated for headache, still stable  Goal Bp < 140  Migraine cocktail q8h - Depakote, Benadryl, Reglan  Neurology consult Dr. Linares Patient is on lisinopril and amlodipine at home .  Cw with home meds.  Goal systolic  blood pressure < 140 Patient had a fall at rehab.  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 Continued correlation and follow-up is recommended.  Echo done on August 24, 2020 normal systolic function .  NSVT on tele, asymptomatic  C/w 25 Lopressor BID  Off telemetry now  Fall precautions  Cardiology Dr. Brush

## 2020-09-03 LAB
ANION GAP SERPL CALC-SCNC: 6 MMOL/L — SIGNIFICANT CHANGE UP (ref 5–17)
BUN SERPL-MCNC: 16 MG/DL — SIGNIFICANT CHANGE UP (ref 7–18)
CALCIUM SERPL-MCNC: 8.3 MG/DL — LOW (ref 8.4–10.5)
CHLORIDE SERPL-SCNC: 107 MMOL/L — SIGNIFICANT CHANGE UP (ref 96–108)
CO2 SERPL-SCNC: 25 MMOL/L — SIGNIFICANT CHANGE UP (ref 22–31)
CREAT SERPL-MCNC: 1.26 MG/DL — SIGNIFICANT CHANGE UP (ref 0.5–1.3)
GLUCOSE SERPL-MCNC: 86 MG/DL — SIGNIFICANT CHANGE UP (ref 70–99)
HCT VFR BLD CALC: 32.6 % — LOW (ref 34.5–45)
HGB BLD-MCNC: 10.3 G/DL — LOW (ref 11.5–15.5)
MAGNESIUM SERPL-MCNC: 2.4 MG/DL — SIGNIFICANT CHANGE UP (ref 1.6–2.6)
MCHC RBC-ENTMCNC: 28 PG — SIGNIFICANT CHANGE UP (ref 27–34)
MCHC RBC-ENTMCNC: 31.6 GM/DL — LOW (ref 32–36)
MCV RBC AUTO: 88.6 FL — SIGNIFICANT CHANGE UP (ref 80–100)
NRBC # BLD: 0 /100 WBCS — SIGNIFICANT CHANGE UP (ref 0–0)
PHOSPHATE SERPL-MCNC: 3.7 MG/DL — SIGNIFICANT CHANGE UP (ref 2.5–4.5)
PLATELET # BLD AUTO: 337 K/UL — SIGNIFICANT CHANGE UP (ref 150–400)
POTASSIUM SERPL-MCNC: 4.5 MMOL/L — SIGNIFICANT CHANGE UP (ref 3.5–5.3)
POTASSIUM SERPL-SCNC: 4.5 MMOL/L — SIGNIFICANT CHANGE UP (ref 3.5–5.3)
RBC # BLD: 3.68 M/UL — LOW (ref 3.8–5.2)
RBC # FLD: 15 % — HIGH (ref 10.3–14.5)
SARS-COV-2 RNA SPEC QL NAA+PROBE: SIGNIFICANT CHANGE UP
SODIUM SERPL-SCNC: 138 MMOL/L — SIGNIFICANT CHANGE UP (ref 135–145)
WBC # BLD: 7.74 K/UL — SIGNIFICANT CHANGE UP (ref 3.8–10.5)
WBC # FLD AUTO: 7.74 K/UL — SIGNIFICANT CHANGE UP (ref 3.8–10.5)

## 2020-09-03 PROCEDURE — 99233 SBSQ HOSP IP/OBS HIGH 50: CPT

## 2020-09-03 RX ORDER — OXYCODONE HYDROCHLORIDE 5 MG/1
2.5 TABLET ORAL EVERY 8 HOURS
Refills: 0 | Status: DISCONTINUED | OUTPATIENT
Start: 2020-09-03 | End: 2020-09-04

## 2020-09-03 RX ORDER — ACETAMINOPHEN 500 MG
1000 TABLET ORAL ONCE
Refills: 0 | Status: COMPLETED | OUTPATIENT
Start: 2020-09-03 | End: 2020-09-03

## 2020-09-03 RX ORDER — METOPROLOL TARTRATE 50 MG
50 TABLET ORAL
Refills: 0 | Status: DISCONTINUED | OUTPATIENT
Start: 2020-09-03 | End: 2020-09-04

## 2020-09-03 RX ADMIN — Medication 25 MILLIGRAM(S): at 05:24

## 2020-09-03 RX ADMIN — AMLODIPINE BESYLATE 10 MILLIGRAM(S): 2.5 TABLET ORAL at 05:24

## 2020-09-03 RX ADMIN — SENNA PLUS 2 TABLET(S): 8.6 TABLET ORAL at 22:19

## 2020-09-03 RX ADMIN — Medication 50 MILLIGRAM(S): at 17:37

## 2020-09-03 RX ADMIN — Medication 4 MILLIGRAM(S): at 22:19

## 2020-09-03 RX ADMIN — Medication 30 MILLIGRAM(S): at 14:46

## 2020-09-03 RX ADMIN — Medication 30 MILLIGRAM(S): at 20:48

## 2020-09-03 RX ADMIN — Medication 10 MILLIGRAM(S): at 05:24

## 2020-09-03 RX ADMIN — Medication 1000 MILLIGRAM(S): at 18:10

## 2020-09-03 RX ADMIN — Medication 30 MILLIGRAM(S): at 21:20

## 2020-09-03 RX ADMIN — Medication 10 MILLIGRAM(S): at 22:21

## 2020-09-03 RX ADMIN — LISINOPRIL 40 MILLIGRAM(S): 2.5 TABLET ORAL at 05:24

## 2020-09-03 RX ADMIN — Medication 25 MILLIGRAM(S): at 22:19

## 2020-09-03 RX ADMIN — Medication 30 MILLIGRAM(S): at 09:16

## 2020-09-03 RX ADMIN — OXYCODONE HYDROCHLORIDE 2.5 MILLIGRAM(S): 5 TABLET ORAL at 23:44

## 2020-09-03 RX ADMIN — Medication 30 MILLIGRAM(S): at 08:46

## 2020-09-03 RX ADMIN — Medication 25 MILLIGRAM(S): at 14:17

## 2020-09-03 RX ADMIN — POLYETHYLENE GLYCOL 3350 17 GRAM(S): 17 POWDER, FOR SOLUTION ORAL at 22:24

## 2020-09-03 RX ADMIN — Medication 27.5 MILLIGRAM(S): at 22:20

## 2020-09-03 RX ADMIN — OXYCODONE HYDROCHLORIDE 2.5 MILLIGRAM(S): 5 TABLET ORAL at 23:11

## 2020-09-03 RX ADMIN — Medication 27.5 MILLIGRAM(S): at 05:24

## 2020-09-03 RX ADMIN — Medication 10 MILLIGRAM(S): at 14:17

## 2020-09-03 RX ADMIN — Medication 400 MILLIGRAM(S): at 17:40

## 2020-09-03 RX ADMIN — Medication 27.5 MILLIGRAM(S): at 14:17

## 2020-09-03 RX ADMIN — Medication 30 MILLIGRAM(S): at 15:16

## 2020-09-03 NOTE — PROGRESS NOTE ADULT - PROBLEM SELECTOR PLAN 8
RISK                                                          Points  [] Previous VTE                                           3  [] Thrombophilia                                        2  [] Lower limb paralysis                              2   [] Current Cancer                                       2   [x] Immobilization > 24 hrs                        1  [] ICU/CCU stay > 24 hours                       1  [x] Age > 60                                                   1  Improve Score - 2  holding off chemo prophylaxis for DVT for recent ICH, jordan continue with SCDs  GI ppx: Not indicated  Diet: Soft, f/u s/s eval  Electrolytes replaced PRN  Dispo: [facility]  DNR/DNI MOLST in chart
RISK                                                          Points  [] Previous VTE                                           3  [] Thrombophilia                                        2  [] Lower limb paralysis                              2   [] Current Cancer                                       2   [x] Immobilization > 24 hrs                        1  [] ICU/CCU stay > 24 hours                       1  [x] Age > 60                                                   1  Improve Score - 2  holding off chemo prophylaxis for DVT for recent ICH, jordan continue with SCDs  GI ppx: Not indicated  Diet: Soft, f/u s/s eval  Electrolytes replaced PRN  Dispo: [facility]  DNR/DNI MOLST in chart

## 2020-09-03 NOTE — SWALLOW BEDSIDE ASSESSMENT ADULT - SWALLOW EVAL: PROGNOSIS
Patient presents with oral dysphagia characterized by reduced bolus formation/manipulation and lingual stasis of regular textures due to ill-fitting dentures. Patient presents with oral dysphagia characterized by reduced bolus formation/manipulation and lingual stasis of regular textures due to ill-fitting dentures placing pt at risk for aspiration of solid textures.

## 2020-09-03 NOTE — PROGRESS NOTE ADULT - PROBLEM SELECTOR PLAN 3
Temp of 100.7F with   normal WBC  CT chest, abdomen Pelvis and CT head -no significant findings, CT head stable  CXR wnl  Patients UA positive  BCx growing Gram positive cocci  Abx as above  Patient had elevated lactate , trended down to normal.  F/u blood Cx sent 9/2

## 2020-09-03 NOTE — SWALLOW BEDSIDE ASSESSMENT ADULT - ORAL PHASE
Within functional limits Decreased anterior-posterior movement of the bolus Lingual stasis/Delayed oral transit time

## 2020-09-03 NOTE — PROGRESS NOTE ADULT - SUBJECTIVE AND OBJECTIVE BOX
The patient reported continuing to have headaches earlier today. She was noted to be hypertensive this morning.    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 Patient is AAO X 2 is a very poor historian. History was taken with the help of friend at bedside. Patient was recently discharged after she was diagnosed with ICH bleed to [facility] rehab and there she had a fall. No specific details about fall are known. Patients friend at bedside told us that she got a call from nursing home that patient had a fall and she is being transferred to hospital. Patient complains of abdominal pain and pain while urination associated with fever and chills.    ED: patient had a temp of 100.7F with ,  normal WBC. Patient's UA was sent and CT abdomen Pelvis and CT head was done.    Patients UA is positive for WBC 11-25 with positive nitrites and leukocyte esterase.  Patient urine and blood Cx were sent and she was given a dose of IV Zosyn . (30 Aug 2020 18:33)      Interval History -        Subjective:        MEDICATIONS  (STANDING):  amLODIPine   Tablet 10 milliGRAM(s) Oral daily  diphenhydrAMINE 25 milliGRAM(s) Oral every 8 hours  doxazosin 4 milliGRAM(s) Oral at bedtime  lisinopril 40 milliGRAM(s) Oral daily  metoclopramide Injectable 10 milliGRAM(s) IV Push once  metoclopramide Injectable 10 milliGRAM(s) IV Push every 8 hours  metoprolol tartrate 50 milliGRAM(s) Oral two times a day  senna 2 Tablet(s) Oral at bedtime  valproate sodium IVPB 500 milliGRAM(s) IV Intermittent every 8 hours    MEDICATIONS  (PRN):  acetaminophen   Tablet .. 650 milliGRAM(s) Oral every 4 hours PRN Mild Pain (1 - 3)  ketorolac   Injectable 30 milliGRAM(s) IV Push every 6 hours PRN Severe Pain (7 - 10)  oxyCODONE    IR 2.5 milliGRAM(s) Oral every 8 hours PRN Moderate Pain (4 - 6)  polyethylene glycol 3350 17 Gram(s) Oral two times a day PRN Constipation      Allergies    No Known Allergies    Intolerances        Review of Systems:  General: [ ] None, [ ] chills, [ ]fatigue, [ ] fevers  Skin: [ ] None, [ ] rash   HEENT: [ ] None, [ ] head injury, [ ] blurred vision, [ ] double vision, [ ] eye pain, [ ] visual loss, [ ] hearing loss, [ ] deafness, [ ] ear pain, [ ] ringing in the ears, [ ] vertigo, [ ] sinus pain, [ ] voice changes  Neck: [ ] None, [ ] neck stiffness  Respiratory: [ ] None, [ ] cough, [ ] difficulty breathing  Cardiovascular: [ ] None, [ ] calf cramps, [ ] chest pain, [ ] leg pain, [ ] swelling, [ ] rapid heart rate, [ ] shortness of breath  Gastrointestinal: [ ] None, [ ] abdominal pain, [ ] nausea, [ ] vomiting  Musculoskeletal: [ ] None, [ ] back pain, [ ] joint pain, [ ] joint stiffness, [ ] leg cramps, [ ] muscle atrophy, [ ] muscle cramps, [ ] muscle weakness, [ ] swelling of extremities  Neurological: [ ] None, [ ] Dizziness, [ ] decreased memory, [ ] fainting, [ ] focal neurological symptoms, [ ] headaches, [ ] incontinence of stool, [ ] incontinence of urine, [ ] loss of consciousness, [ ] numbness, [ ] seizures, [ ] spinning sensation, [ ] stroke, [ ] trouble walking, [ ] unsteadiness, [ ] visual changes, [ ] weakness  Psychiatric: [ ] None,  [ ] depression, [ ] anxiety, [ ] hallucinations, [ ] inability to concentrate, [ ] mood changes, [ ] panic attacks  Hematology: [ ] None,  [ ] blood clots, [ ] spontaneous bleeding      Objective:   Vital Signs Last 24 Hrs  T(C): 36.2 (04 Sep 2020 05:27), Max: 36.7 (03 Sep 2020 20:20)  T(F): 97.2 (04 Sep 2020 05:27), Max: 98 (03 Sep 2020 20:20)  HR: 71 (04 Sep 2020 05:27) (71 - 90)  BP: 158/63 (04 Sep 2020 05:27) (130/63 - 158/63)  BP(mean): --  RR: 17 (04 Sep 2020 05:27) (17 - 18)  SpO2: 97% (04 Sep 2020 05:27) (97% - 97%)    General Exam:   General appearance: No acute distress                 Cardiovascular: Pedal dorsalis pulses intact bilaterally    Neurological Exam:  Mental Status: Orientated to self, date and place.  Attention intact.  No dysarthria, aphasia or neglect.  Knowledge intact.  Registration intact.  Short and long term memory grossly intact.      Cranial Nerves: CN I - not tested.  PERRL, EOMI, VFF, no nystagmus or diplopia.  No APD.  Fundi not visualized bilaterally.  CN V1-3 intact to light touch and pinprick.  No facial asymmetry.  Hearing intact to finger rub bilaterally.  Tongue, uvula and palate midline.  Sternocleidomastoid and Trapezius intact bilaterally.    Motor:   Tone: normal.                  Strength: intact throughout  Pronator drift: none                 Dysmeria: None to finger-nose-finger or heel-shin-heel  No truncal ataxia.    Tremor: No resting, postural or action tremor.  No myoclonus.    Sensation: intact to light touch, pinprick, vibration and proprioception    Deep Tendon Reflexes: 1+ bilateral biceps, triceps, brachioradialis, knee and ankle  Toes flexor bilaterally    Gait: normal and stable.      Other:    09-03    138  |  107  |  16  ----------------------------<  86  4.5   |  25  |  1.26    Ca    8.3<L>      03 Sep 2020 07:13  Phos  3.7     09-03  Mg     2.4     09-03              Radiology    EKG:  tele:  TTE:  EEG:                 Please contact the Neurology consult service with any questions.    Humphrey Linares MD   of Neurology  [facility] [employer] [facility] at [facility] Neurology Follow up note    Name  CHARLIE DE LA ROSA    HPI / NEURO HPI  87 female with medical history of HTN and ICH (diagnosed on 23rd August)  was sent in from [facility] rehab after a fall. Patient is AAO X 2 is a very poor historian. History was taken with the help of friend at bedside. Patient was recently discharged after she was diagnosed with ICH bleed to [facility] rehab and there she had a fall. No specific details about fall are known. Patients friend at bedside told us that she got a call from nursing home that patient had a fall and she is being transferred to hospital. Patient complains of abdominal pain and pain while urination associated with fever and chills.  ED: patient had a temp of 100.7F with ,  normal WBC. Patient's UA was sent and CT abdomen Pelvis and CT head was done.  Patients UA is positive for WBC 11-25 with positive nitrites and leukocyte esterase.  Patient urine and blood Cx were sent and she was given a dose of IV Zosyn . (30 Aug 2020 18:33)    Interval History -  The patient reported continuing to have headaches earlier today. She was noted to be hypertensive this morning.    MEDICATIONS  (STANDING):  amLODIPine   Tablet 10 milliGRAM(s) Oral daily  diphenhydrAMINE 25 milliGRAM(s) Oral every 8 hours  doxazosin 4 milliGRAM(s) Oral at bedtime  lisinopril 40 milliGRAM(s) Oral daily  metoclopramide Injectable 10 milliGRAM(s) IV Push once  metoclopramide Injectable 10 milliGRAM(s) IV Push every 8 hours  metoprolol tartrate 50 milliGRAM(s) Oral two times a day  senna 2 Tablet(s) Oral at bedtime  valproate sodium IVPB 500 milliGRAM(s) IV Intermittent every 8 hours    MEDICATIONS  (PRN):  acetaminophen   Tablet .. 650 milliGRAM(s) Oral every 4 hours PRN Mild Pain (1 - 3)  ketorolac   Injectable 30 milliGRAM(s) IV Push every 6 hours PRN Severe Pain (7 - 10)  oxyCODONE    IR 2.5 milliGRAM(s) Oral every 8 hours PRN Moderate Pain (4 - 6)  polyethylene glycol 3350 17 Gram(s) Oral two times a day PRN Constipation    Allergies  No Known Allergies    Review of Systems: Fourteen systems reviewed and negative except as in HPI / Interval History.      Objective:   Vital Signs Last 24 Hrs  T(C): 36.2 (04 Sep 2020 05:27), Max: 36.7 (03 Sep 2020 20:20)  T(F): 97.2 (04 Sep 2020 05:27), Max: 98 (03 Sep 2020 20:20)  HR: 71 (04 Sep 2020 05:27) (71 - 90)  BP: 158/63 (04 Sep 2020 05:27) (130/63 - 158/63)  RR: 17 (04 Sep 2020 05:27) (17 - 18)  SpO2: 97% (04 Sep 2020 05:27) (97% - 97%)    General Exam:  General: No acute distress  HEENT: No tenderness to palpation at scalp  Respiratory: CTAB/l.  No crackles, rhonchi, or wheezes.  Cardiovascular: RRR, No murmurs, Full b/l radial and pedal pulses    Neurological Exam:  General / Mental Status: Oriented to person, place, and time.  No dysarthria or aphasia present.  Naming and repetition intact.  Cranial Nerves: PERRLA, EOMI x 2, VFF x 4, No nystagmus or diplopia.  B/l V1-V3 equal to light touch and pinprick.  Symmetric facial movement and palate elevation.  B/l hearing equal to finger rub.  5/5 strength with b/l sternocleidomastoid and trapezius.  Midline tongue protrusion with no atrophy or fasciculations.  Motor: Normal bulk and tone in all four extremities.  5/5 strength throughout all four extremities.  No downward drift, rigidity, spasticity, or tremors in any of the four extremities.  Sensation: Intact to light touch and pinprick in all four extremities.  Coordination: No dysmetria with b/l finger-to-nose and heel raise tests.  Reflexes: 1+ and symmetric at b/l biceps, triceps, brachioradialis, patellae, and ankles.  Toes flexor b/l.  Gait and Romberg testing deferred per patient request.    NIHSS 0  MRS 1      Labs:    09-03    138  |  107  |  16  ----------------------------<  86  4.5   |  25  |  1.26    Ca    8.3<L>      03 Sep 2020 07:13  Phos  3.7     09-03  Mg     2.4     09-03    A1C with Estimated Average Glucose (08.31.20 @ 08:44)    A1C with Estimated Average Glucose Result: 5.0%    Estimated Average Glucose: 97 mg/dL    Lipid Profile (08.24.20 @ 08:27)    Total Cholesterol/HDL Ratio Measurement: 2.7 RATIO    Cholesterol, Serum: 157 mg/dL    Triglycerides, Serum: 81 mg/dL    HDL Cholesterol, Serum: 59 mg/dL    Direct LDL: 82 mg/dL        Neuroimaging:    CT Head (9/1/20):  - Right hemispheric intraparenchymal hemorrhage affecting temporal, parietal, and occipital lobes  - Right temporal subdural hemorrhage  - Vasogenic edema associated with above hemorrhages        Assessment: 87 RHF with headache secondary to chronic intracranial hemorrhage      Recommendations:  - Continue Depacon protocol - It can take up to 5 days to have full effect       - Valproate 500mg IV Q8H for up to 5 days (today is Day 3)       - Diphenhydramine 25mg IV Q8H for same duration (premedication)       - Metoclopramide 10mg IV Q8H for same duration (premedication)    - Consider metoclopramide 10mg Q6H PRN breakthrough headache    - Metoprolol dosage has been increased for better blood pressure control    - Treat BP > 140/90 (Goal /80)    - Continue PT/OT     - Routine follow-up in Neurology clinic for intracranial hemorrhage and headache management        Please contact the Neurology consult service with any questions.    Humphrey Linares MD   of Neurology  [facility] [employer] [facility] at [facility]

## 2020-09-03 NOTE — SWALLOW BEDSIDE ASSESSMENT ADULT - SLP PERTINENT HISTORY OF CURRENT PROBLEM
87 year old female with medical history of HTN and ICH (diagnosed on 23rd August)  was sent in from [facility] rehab after a fall.

## 2020-09-03 NOTE — PROGRESS NOTE ADULT - PROBLEM SELECTOR PLAN 7
Patient has hx of ICH, c/o headache no neurologic deficits  CT head showed CT head was done it is showing stable Intra cranial bleed compared to previous admission,   CT head repeated for headache, still stable  Goal Bp < 140  Migraine cocktail q8h - Depakote, Benadryl, Reglan  Neurology consult Dr. Linares

## 2020-09-03 NOTE — PROGRESS NOTE ADULT - PROBLEM SELECTOR PLAN 6
Patient is on lisinopril and amlodipine at home .  Cw with home meds.  Goal systolic  blood pressure < 140 Patient is on lisinopril and amlodipine at home .  Cw with home meds.  For the metoprolol increased to 50mg bid.

## 2020-09-03 NOTE — SWALLOW BEDSIDE ASSESSMENT ADULT - DATE
Comment: RTC in 4-6 weeks to ensure resolution, if lesion rapidly becomes larger, painful, bleeding, or other red flag signs, counseled pt to RTC earlier
Detail Level: Simple
03-Sep-2020

## 2020-09-03 NOTE — PROGRESS NOTE ADULT - SUBJECTIVE AND OBJECTIVE BOX
Patient denies chest pain or shortness of breath.   Review of systems otherwise (-)  	  acetaminophen   Tablet .. 650 milliGRAM(s) Oral every 4 hours PRN  acetaminophen  IVPB .. 1000 milliGRAM(s) IV Intermittent once  amLODIPine   Tablet 10 milliGRAM(s) Oral daily  diphenhydrAMINE 25 milliGRAM(s) Oral every 8 hours  doxazosin 4 milliGRAM(s) Oral at bedtime  ketorolac   Injectable 30 milliGRAM(s) IV Push every 6 hours PRN  lisinopril 40 milliGRAM(s) Oral daily  metoclopramide Injectable 10 milliGRAM(s) IV Push once  metoclopramide Injectable 10 milliGRAM(s) IV Push every 8 hours  metoprolol tartrate 50 milliGRAM(s) Oral two times a day  oxycodone    5 mG/acetaminophen 325 mG 1 Tablet(s) Oral every 4 hours PRN  polyethylene glycol 3350 17 Gram(s) Oral two times a day PRN  senna 2 Tablet(s) Oral at bedtime  valproate sodium IVPB 500 milliGRAM(s) IV Intermittent every 8 hours                            10.3   7.74  )-----------( 337      ( 03 Sep 2020 07:13 )             32.6       Hemoglobin: 10.3 g/dL (09-03 @ 07:13)  Hemoglobin: 10.4 g/dL (09-02 @ 07:09)  Hemoglobin: 10.5 g/dL (09-01 @ 06:43)  Hemoglobin: 9.3 g/dL (08-31 @ 01:32)  Hemoglobin: 11.7 g/dL (08-30 @ 13:05)      09-03    138  |  107  |  16  ----------------------------<  86  4.5   |  25  |  1.26    Ca    8.3<L>      03 Sep 2020 07:13  Phos  3.7     09-03  Mg     2.4     09-03      Creatinine Trend: 1.26<--, 0.80<--, 0.61<--, 0.68<--, 0.86<--, 0.78<--    COAGS:           T(C): 36.6 (09-03-20 @ 14:04), Max: 36.9 (09-02-20 @ 20:35)  HR: 87 (09-03-20 @ 17:23) (86 - 90)  BP: 154/65 (09-03-20 @ 17:23) (133/64 - 154/65)  RR: 18 (09-03-20 @ 14:04) (18 - 18)  SpO2: 97% (09-03-20 @ 14:04) (94% - 100%)  Wt(kg): --    I&O's Summary      HEENT:  (-)icterus (-)pallor  CV: N S1 S2 1/6 BETHANY (+)2 Pulses B/l  Resp:  Clear to ausculatation B/L, normal effort  GI: (+) BS Soft, NT, ND  Lymph:  (-)Edema, (-)obvious lymphadenopathy  Skin: Warm to touch, Normal turgor  Psych: Appropriate mood and affect      TELEMETRY: 	  OFF        ASSESSMENT/PLAN: 	87y  Female medical history of HTN and ICH (diagnosed on 23rd August)  was sent in from [facility] rehab after a fall urosepsis preserved LV function incidentally noted with NSVT.    - WIll deffer ischemic eval given recent ICH and DNR / DNI status  - no further NSVT Lopressor 25 mg PO BID  - Abx per medical team      Rafiq Brush MD, [facility]  BEEPER [phone]

## 2020-09-03 NOTE — PROGRESS NOTE ADULT - PROBLEM SELECTOR PLAN 2
Blood cx growing Gram positive cocci, f/u repeat blood cx  Abx as above, f/u ID consult with Dr. Mcgill Blood cx growing Gram positive cocci,   Stopped abx yesterday  f/u repeat blood cx sent today  ID consult with Dr. Mcgill Blood cx growing Gram positive cocci, likely contaminant per ID  Stopped abx yesterday  f/u repeat blood cx sent today  ID consult with Dr. Mcgill

## 2020-09-03 NOTE — SWALLOW BEDSIDE ASSESSMENT ADULT - SWALLOW EVAL: RECOMMENDED FEEDING/EATING TECHNIQUES
allow for swallow between intakes/small sips/bites/check mouth frequently for oral residue/pocketing/alternate food with liquid/maintain upright posture during/after eating for 30 mins/oral hygiene

## 2020-09-03 NOTE — PROGRESS NOTE ADULT - ATTENDING COMMENTS
Patient seen and examined. Patient's history, vitals, labs, imaging studies reviewed. Discussed with above resident, agree with note with edits and educated on the diagnosis and management of above medical conditions. Increase dose of metoprolol for better BP control. F/u repeat blood cultures. Plan of care discussed with patient, and agrees, all questions answered.   Lashawn Howard MD

## 2020-09-03 NOTE — SWALLOW BEDSIDE ASSESSMENT ADULT - SPECIFY REASON(S)
Pt seen at b/s for speech/swallow evaluation to determine least restrictive diet level given hx of dysphagia

## 2020-09-03 NOTE — PROGRESS NOTE ADULT - SUBJECTIVE AND OBJECTIVE BOX
PGY-1 Progress Note discussed with attending    PAGER #: [[phone]] TILL 5:00 PM  PLEASE CONTACT ON CALL TEAM:  - On Call Team (Please refer to Tessy) FROM 5:00 PM - 8:30PM  - Nightfloat Team FROM 8:30 -7:30 AM    CHIEF COMPLAINT & BRIEF HOSPITAL COURSE: 87 year old female with medical history of HTN and ICH (diagnosed on 23rd August)  was [facility]. Patient is AAO X 2 is a very poor historian. History was taken with the help of friend at bedside. Patient was recently discharged after she was diagnosed with ICH bleed to [facility] rehab and there she had a fall. No specific details about fall are known. Patients friend at bedside told us that she got a call from nursing home that patient had a fall and she is being transferred to hospital. Patient complains of abdominal pain and pain while urination associated with fever and chills.    INTERVAL HPI/OVERNIGHT EVENTS: Patient has some headache in the morning.     REVIEW OF SYSTEMS:  CONSTITUTIONAL: No fever, weight loss, or fatigue  RESPIRATORY: No cough, wheezing, chills or hemoptysis; No shortness of breath  CARDIOVASCULAR: No chest pain, palpitations, dizziness, or leg swelling  GASTROINTESTINAL: No abdominal pain. No nausea, vomiting, or hematemesis; No diarrhea or constipation. No melena or hematochezia.  GENITOURINARY: No dysuria or hematuria, urinary frequency  NEUROLOGICAL: No headaches, memory loss, loss of strength, numbness, or tremors  SKIN: No itching, burning, rashes, or lesions     Vital Signs Last 24 Hrs  T(C): 36.8 (03 Sep 2020 05:02), Max: 37.1 (02 Sep 2020 15:32)  T(F): 98.3 (03 Sep 2020 05:02), Max: 98.8 (02 Sep 2020 15:32)  HR: 90 (03 Sep 2020 05:02) (80 - 90)  BP: 153/71 (03 Sep 2020 05:02) (125/59 - 153/71)  BP(mean): --  RR: 18 (03 Sep 2020 05:02) (18 - 18)  SpO2: 100% (03 Sep 2020 05:02) (94% - 100%)    PHYSICAL EXAMINATION:  GENERAL: NAD, well built  HEAD:  Atraumatic, Normocephalic  EYES:  conjunctiva and sclera clear  NECK: Supple, No JVD, Normal thyroid  CHEST/LUNG: Clear to auscultation. Clear to percussion bilaterally; No rales, rhonchi, wheezing, or rubs  HEART: Regular rate and rhythm; No murmurs, rubs, or gallops  ABDOMEN: Soft, Nontender, Nondistended; Bowel sounds present  NERVOUS SYSTEM:  Alert & Oriented X3,    EXTREMITIES:  2+ Peripheral Pulses, No clubbing, cyanosis, or edema  SKIN: warm dry                          10.3   7.74  )-----------( 337      ( 03 Sep 2020 07:13 )             32.6     09-03    138  |  107  |  16  ----------------------------<  86  4.5   |  25  |  1.26    Ca    8.3<L>      03 Sep 2020 07:13  Phos  3.7     09-03  Mg     2.4     09-03                CAPILLARY BLOOD GLUCOSE      RADIOLOGY & ADDITIONAL TESTS: PGY-1 Progress Note discussed with attending    PAGER #: [[phone]] TILL 5:00 PM  PLEASE CONTACT ON CALL TEAM:  - On Call Team (Please refer to Tessy) FROM 5:00 PM - 8:30PM  - Nightfloat Team FROM 8:30 -7:30 AM    CHIEF COMPLAINT & BRIEF HOSPITAL COURSE: 87 year old female with medical history of HTN and ICH (diagnosed on 23rd August)  [facility]. Patient is AAO X 2 is a very poor historian. History was taken with the help of friend at bedside. Patient was recently discharged after she was diagnosed with ICH bleed to [facility] rehab and there she had a fall. No specific details about fall are known. Patients friend at bedside told us that she got a call from nursing home that patient had a fall and she is being transferred to hospital. Patient complains of abdominal pain and pain while urination associated with fever and chills. For the falls, CT head was done it is showing stable Intra cranial bleed compared to previous admission. Right temporal parietal/occipital hemorrhage and presumed infarct is again identified with no significant change. Continued correlation and follow-up is recommended. Echo done on August 24 .2020 normal systolic function . C/w 25 Lopressor BID. Off telemetry now. Patients UA is positive for WBC 11-25 with positive nitrites and leukocyte esterase. UCx negative. 1st bcx showed gram positive cocci. Stopped abx yesterday. f/u bcx sent today. ID Dr. Mcgill. For her high BPs, metoprolol increased to 50mg bid. Neuro Dr. Linares consulted.     INTERVAL HPI/OVERNIGHT EVENTS: Patient has some headache in the morning. Pain meds helped it.     REVIEW OF SYSTEMS:  CONSTITUTIONAL: No fever, weight loss, or fatigue  RESPIRATORY: No cough, wheezing, chills or hemoptysis; No shortness of breath  CARDIOVASCULAR: No chest pain, palpitations, dizziness, or leg swelling  GASTROINTESTINAL: No abdominal pain. No nausea, vomiting, or hematemesis; No diarrhea or constipation. No melena or hematochezia.  GENITOURINARY: No dysuria or hematuria, urinary frequency  NEUROLOGICAL: No headaches, memory loss, loss of strength, numbness, or tremors  SKIN: No itching, burning, rashes, or lesions     Vital Signs Last 24 Hrs  T(C): 36.8 (03 Sep 2020 05:02), Max: 37.1 (02 Sep 2020 15:32)  T(F): 98.3 (03 Sep 2020 05:02), Max: 98.8 (02 Sep 2020 15:32)  HR: 90 (03 Sep 2020 05:02) (80 - 90)  BP: 153/71 (03 Sep 2020 05:02) (125/59 - 153/71)  BP(mean): --  RR: 18 (03 Sep 2020 05:02) (18 - 18)  SpO2: 100% (03 Sep 2020 05:02) (94% - 100%)    PHYSICAL EXAMINATION:  GENERAL: NAD, well built  HEAD:  Atraumatic, Normocephalic  EYES:  conjunctiva and sclera clear  NECK: Supple, No JVD, Normal thyroid  CHEST/LUNG: Clear to auscultation. Clear to percussion bilaterally; No rales, rhonchi, wheezing, or rubs  HEART: Regular rate and rhythm; No murmurs, rubs, or gallops  ABDOMEN: Soft, Nontender, Nondistended; Bowel sounds present  NERVOUS SYSTEM:  Alert & Oriented X3,    EXTREMITIES:  2+ Peripheral Pulses, No clubbing, cyanosis, or edema  SKIN: warm dry                          10.3   7.74  )-----------( 337      ( 03 Sep 2020 07:13 )             32.6     09-03    138  |  107  |  16  ----------------------------<  86  4.5   |  25  |  1.26    Ca    8.3<L>      03 Sep 2020 07:13  Phos  3.7     09-03  Mg     2.4     09-03    CAPILLARY BLOOD GLUCOSE      RADIOLOGY & ADDITIONAL TESTS: PGY-1 Progress Note discussed with attending    PAGER #: [[phone]] TILL 5:00 PM  PLEASE CONTACT ON CALL TEAM:  - On Call Team (Please refer to Tessy) FROM 5:00 PM - 8:30PM  - Nightfloat Team FROM 8:30 -7:30 AM    CHIEF COMPLAINT & BRIEF HOSPITAL COURSE: 87 year old female with medical history of HTN and ICH (diagnosed on 23rd August)  [facility]. Patient is AAO X 2 is a very poor historian. History was taken with the help of friend at bedside. Patient was recently discharged after she was diagnosed with ICH bleed to [facility] rehab and there she had a fall. No specific details about fall are known. Patients friend at bedside told us that she got a call from nursing home that patient had a fall and she is being transferred to hospital. Patient complains of abdominal pain and pain while urination associated with fever and chills. For the falls, CT head was done it is showing stable Intra cranial bleed compared to previous admission. Right temporal parietal/occipital hemorrhage and presumed infarct is again identified with no significant change. Continued correlation and follow-up is recommended. Echo done on August 24 .2020 normal systolic function . C/w 25 Lopressor BID. Off telemetry now. Patients UA is positive for WBC 11-25 with positive nitrites and leukocyte esterase. UCx negative. 1st bcx showed gram positive cocci. Stopped abx yesterday. f/u bcx sent today. ID Dr. Mcgill. For her high BPs, metoprolol increased to 50mg bid. Neuro Dr. Linares consulted.     INTERVAL HPI/OVERNIGHT EVENTS: Patient has some headache in the morning. Pain meds helped it.     REVIEW OF SYSTEMS:  CONSTITUTIONAL: No fever, weight loss, has fatigue  RESPIRATORY: No cough, wheezing, chills or hemoptysis; No shortness of breath  CARDIOVASCULAR: No chest pain, palpitations, dizziness, or leg swelling  GASTROINTESTINAL: No abdominal pain. No nausea, vomiting, or hematemesis; No diarrhea or constipation. No melena or hematochezia.  GENITOURINARY: No dysuria or hematuria, urinary frequency  NEUROLOGICAL: No headaches, memory loss, loss of strength, numbness, or tremors  SKIN: No itching, burning, rashes, or lesions   All other ROS are negative    Vital Signs Last 24 Hrs  T(C): 36.8 (03 Sep 2020 05:02), Max: 37.1 (02 Sep 2020 15:32)  T(F): 98.3 (03 Sep 2020 05:02), Max: 98.8 (02 Sep 2020 15:32)  HR: 90 (03 Sep 2020 05:02) (80 - 90)  BP: 153/71 (03 Sep 2020 05:02) (125/59 - 153/71)  RR: 18 (03 Sep 2020 05:02) (18 - 18)  SpO2: 100% (03 Sep 2020 05:02) (94% - 100%)    PHYSICAL EXAMINATION:  GENERAL: NAD, well built  HEAD:  Atraumatic, Normocephalic  EYES:  conjunctiva and sclera clear  NECK: Supple, No JVD, Normal thyroid  CHEST/LUNG: Clear to auscultation. Clear to percussion bilaterally; No rales, rhonchi, wheezing, or rubs  HEART: Regular rate and rhythm; No murmurs, rubs, or gallops  ABDOMEN: Soft, Nontender, Nondistended; Bowel sounds present  NERVOUS SYSTEM:  Alert & Oriented X3,    EXTREMITIES:  2+ Peripheral Pulses, No clubbing, cyanosis, or edema  SKIN: warm dry                          10.3   7.74  )-----------( 337      ( 03 Sep 2020 07:13 )             32.6     09-03    138  |  107  |  16  ----------------------------<  86  4.5   |  25  |  1.26    Ca    8.3<L>      03 Sep 2020 07:13  Phos  3.7     09-03  Mg     2.4     09-03

## 2020-09-03 NOTE — PROGRESS NOTE ADULT - PROBLEM SELECTOR PLAN 1
Patients UA is positive for WBC 11-25 with positive nitrites and leukocyte esterase.  UCx negative  On Ctx 8/30-9/1  Vanc/Zosyn 9/1-   ID Dr. Mcgill Patients UA is positive for WBC 11-25 with positive nitrites and leukocyte esterase.  UCx negative  Stopped abx yesterday  ID Dr. Mcgill

## 2020-09-03 NOTE — SWALLOW BEDSIDE ASSESSMENT ADULT - COMMENTS
Patient presents as AAOx2-3 (gross awareness to time); primarily Stateless speaking. Pt's caregivers also present at time of assessment.

## 2020-09-03 NOTE — PROGRESS NOTE ADULT - PROBLEM SELECTOR PLAN 5
Patient had a fall at rehab.  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 Continued correlation and follow-up is recommended.  Echo done on August 24, 2020 normal systolic function .  NSVT on tele, asymptomatic  C/w 25 Lopressor BID  Off telemetry now  Fall precautions  Cardiology Dr. Brush Patient had a fall at rehab.  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 Continued correlation and follow-up is recommended.  Repeat CT head showed similar findings as first and stable.  Echo done on August 24, 2020 normal systolic function .  NSVT on tele, asymptomatic  C/w 25 Lopressor BID  Off telemetry now  Fall precautions  Cardiology Dr. Brush

## 2020-09-03 NOTE — PROGRESS NOTE ADULT - ASSESSMENT
87 female with medical history of HTN and ICH (diagnosed on 23rd August) was sent in from [facility] rehab after a fall. Patient is AAO X 2 is a very poor historian. History was taken with the help of friend at bed side. Patient was recently discharged after she was diagnosed with ICH bleed to [facility] rehab and there she had a fall. No specific details about fall are known. Patients friend at bed side told us that she got a call from nursing home that patient had a fall and she is being transferred to hospital. Patient complains of abdominal pain and pain while urination associated with fever and chills.    Patient is admitted for Sepsis, Blood cultures growing gram positive cocci on Vanc/Zosyn. 87 female with medical history of HTN and ICH (diagnosed on 23rd August) [facility]. Patient is AAO X 2 is a very poor historian. History was taken with the help of friend at bed side. Patient was recently discharged after she was diagnosed with ICH bleed to [facility] rehab and there she had a fall. No specific details about fall are known. Patients friend at bed side told us that she got a call from nursing home that patient had a fall and she is being transferred to hospital. Patient complains of abdominal pain and pain while urination associated with fever and chills.    Patient is admitted for Sepsis, Blood cultures growing gram positive cocci on Vanc/Zosyn. Likely contaminant, antibiotics d/c per ID

## 2020-09-03 NOTE — PROGRESS NOTE ADULT - ATTENDING COMMENTS
I counseled the patient in Sw[place] about the medications being provided to help manage her blood pressure, intracranial hemorrhage, and headache.

## 2020-09-04 ENCOUNTER — TRANSCRIPTION ENCOUNTER (OUTPATIENT)
Age: 85
End: 2020-09-04

## 2020-09-04 VITALS
OXYGEN SATURATION: 95 % | TEMPERATURE: 98 F | RESPIRATION RATE: 18 BRPM | HEART RATE: 87 BPM | SYSTOLIC BLOOD PRESSURE: 157 MMHG | DIASTOLIC BLOOD PRESSURE: 69 MMHG

## 2020-09-04 LAB
ANION GAP SERPL CALC-SCNC: 6 MMOL/L — SIGNIFICANT CHANGE UP (ref 5–17)
BUN SERPL-MCNC: 19 MG/DL — HIGH (ref 7–18)
CALCIUM SERPL-MCNC: 8.3 MG/DL — LOW (ref 8.4–10.5)
CHLORIDE SERPL-SCNC: 106 MMOL/L — SIGNIFICANT CHANGE UP (ref 96–108)
CO2 SERPL-SCNC: 27 MMOL/L — SIGNIFICANT CHANGE UP (ref 22–31)
CREAT SERPL-MCNC: 1.24 MG/DL — SIGNIFICANT CHANGE UP (ref 0.5–1.3)
CULTURE RESULTS: SIGNIFICANT CHANGE UP
GLUCOSE SERPL-MCNC: 79 MG/DL — SIGNIFICANT CHANGE UP (ref 70–99)
HCT VFR BLD CALC: 30.6 % — LOW (ref 34.5–45)
HGB BLD-MCNC: 9.9 G/DL — LOW (ref 11.5–15.5)
MCHC RBC-ENTMCNC: 28.9 PG — SIGNIFICANT CHANGE UP (ref 27–34)
MCHC RBC-ENTMCNC: 32.4 GM/DL — SIGNIFICANT CHANGE UP (ref 32–36)
MCV RBC AUTO: 89.2 FL — SIGNIFICANT CHANGE UP (ref 80–100)
NRBC # BLD: 0 /100 WBCS — SIGNIFICANT CHANGE UP (ref 0–0)
PLATELET # BLD AUTO: 315 K/UL — SIGNIFICANT CHANGE UP (ref 150–400)
POTASSIUM SERPL-MCNC: 4.4 MMOL/L — SIGNIFICANT CHANGE UP (ref 3.5–5.3)
POTASSIUM SERPL-SCNC: 4.4 MMOL/L — SIGNIFICANT CHANGE UP (ref 3.5–5.3)
RBC # BLD: 3.43 M/UL — LOW (ref 3.8–5.2)
RBC # FLD: 15.3 % — HIGH (ref 10.3–14.5)
SODIUM SERPL-SCNC: 139 MMOL/L — SIGNIFICANT CHANGE UP (ref 135–145)
SPECIMEN SOURCE: SIGNIFICANT CHANGE UP
WBC # BLD: 7.98 K/UL — SIGNIFICANT CHANGE UP (ref 3.8–10.5)
WBC # FLD AUTO: 7.98 K/UL — SIGNIFICANT CHANGE UP (ref 3.8–10.5)

## 2020-09-04 PROCEDURE — 71250 CT THORAX DX C-: CPT

## 2020-09-04 PROCEDURE — 83540 ASSAY OF IRON: CPT

## 2020-09-04 PROCEDURE — 83690 ASSAY OF LIPASE: CPT

## 2020-09-04 PROCEDURE — 80053 COMPREHEN METABOLIC PANEL: CPT

## 2020-09-04 PROCEDURE — 85027 COMPLETE CBC AUTOMATED: CPT

## 2020-09-04 PROCEDURE — 87635 SARS-COV-2 COVID-19 AMP PRB: CPT

## 2020-09-04 PROCEDURE — 74176 CT ABD & PELVIS W/O CONTRAST: CPT

## 2020-09-04 PROCEDURE — 71045 X-RAY EXAM CHEST 1 VIEW: CPT

## 2020-09-04 PROCEDURE — 84466 ASSAY OF TRANSFERRIN: CPT

## 2020-09-04 PROCEDURE — 86769 SARS-COV-2 COVID-19 ANTIBODY: CPT

## 2020-09-04 PROCEDURE — 83735 ASSAY OF MAGNESIUM: CPT

## 2020-09-04 PROCEDURE — 71046 X-RAY EXAM CHEST 2 VIEWS: CPT

## 2020-09-04 PROCEDURE — 87086 URINE CULTURE/COLONY COUNT: CPT

## 2020-09-04 PROCEDURE — 85610 PROTHROMBIN TIME: CPT

## 2020-09-04 PROCEDURE — 83550 IRON BINDING TEST: CPT

## 2020-09-04 PROCEDURE — 84484 ASSAY OF TROPONIN QUANT: CPT

## 2020-09-04 PROCEDURE — 82728 ASSAY OF FERRITIN: CPT

## 2020-09-04 PROCEDURE — 82607 VITAMIN B-12: CPT

## 2020-09-04 PROCEDURE — 83605 ASSAY OF LACTIC ACID: CPT

## 2020-09-04 PROCEDURE — 82746 ASSAY OF FOLIC ACID SERUM: CPT

## 2020-09-04 PROCEDURE — 96361 HYDRATE IV INFUSION ADD-ON: CPT

## 2020-09-04 PROCEDURE — 87040 BLOOD CULTURE FOR BACTERIA: CPT

## 2020-09-04 PROCEDURE — 80048 BASIC METABOLIC PNL TOTAL CA: CPT

## 2020-09-04 PROCEDURE — 93005 ELECTROCARDIOGRAM TRACING: CPT

## 2020-09-04 PROCEDURE — 81001 URINALYSIS AUTO W/SCOPE: CPT

## 2020-09-04 PROCEDURE — 96365 THER/PROPH/DIAG IV INF INIT: CPT

## 2020-09-04 PROCEDURE — 97162 PT EVAL MOD COMPLEX 30 MIN: CPT

## 2020-09-04 PROCEDURE — 92610 EVALUATE SWALLOWING FUNCTION: CPT

## 2020-09-04 PROCEDURE — 70450 CT HEAD/BRAIN W/O DYE: CPT

## 2020-09-04 PROCEDURE — 85730 THROMBOPLASTIN TIME PARTIAL: CPT

## 2020-09-04 PROCEDURE — 83036 HEMOGLOBIN GLYCOSYLATED A1C: CPT

## 2020-09-04 PROCEDURE — 36415 COLL VENOUS BLD VENIPUNCTURE: CPT

## 2020-09-04 PROCEDURE — 87150 DNA/RNA AMPLIFIED PROBE: CPT

## 2020-09-04 PROCEDURE — 84100 ASSAY OF PHOSPHORUS: CPT

## 2020-09-04 PROCEDURE — U0003: CPT

## 2020-09-04 PROCEDURE — 99285 EMERGENCY DEPT VISIT HI MDM: CPT

## 2020-09-04 RX ORDER — DIVALPROEX SODIUM 500 MG/1
1 TABLET, DELAYED RELEASE ORAL
Qty: 1 | Refills: 0
Start: 2020-09-04 | End: 2020-09-08

## 2020-09-04 RX ORDER — OXYCODONE HYDROCHLORIDE 5 MG/1
2.5 TABLET ORAL
Qty: 0 | Refills: 0 | DISCHARGE
Start: 2020-09-04 | End: 2020-09-09

## 2020-09-04 RX ORDER — DIVALPROEX SODIUM 500 MG/1
1 TABLET, DELAYED RELEASE ORAL
Qty: 30 | Refills: 0
Start: 2020-09-04 | End: 2020-09-18

## 2020-09-04 RX ORDER — METOPROLOL TARTRATE 50 MG
1 TABLET ORAL
Qty: 0 | Refills: 0 | DISCHARGE
Start: 2020-09-04

## 2020-09-04 RX ORDER — METOPROLOL TARTRATE 50 MG
100 TABLET ORAL
Refills: 0 | Status: DISCONTINUED | OUTPATIENT
Start: 2020-09-04 | End: 2020-09-04

## 2020-09-04 RX ORDER — DIVALPROEX SODIUM 500 MG/1
1 TABLET, DELAYED RELEASE ORAL
Qty: 30 | Refills: 0
Start: 2020-09-04 | End: 2021-01-01

## 2020-09-04 RX ORDER — METOCLOPRAMIDE HCL 10 MG
1 TABLET ORAL
Qty: 32 | Refills: 0
Start: 2020-09-04 | End: 2020-09-11

## 2020-09-04 RX ORDER — DIVALPROEX SODIUM 500 MG/1
1 TABLET, DELAYED RELEASE ORAL
Qty: 15 | Refills: 0
Start: 2020-09-04 | End: 2020-09-08

## 2020-09-04 RX ADMIN — Medication 10 MILLIGRAM(S): at 13:46

## 2020-09-04 RX ADMIN — Medication 30 MILLIGRAM(S): at 06:12

## 2020-09-04 RX ADMIN — AMLODIPINE BESYLATE 10 MILLIGRAM(S): 2.5 TABLET ORAL at 05:28

## 2020-09-04 RX ADMIN — LISINOPRIL 40 MILLIGRAM(S): 2.5 TABLET ORAL at 05:28

## 2020-09-04 RX ADMIN — Medication 100 MILLIGRAM(S): at 17:30

## 2020-09-04 RX ADMIN — Medication 10 MILLIGRAM(S): at 05:28

## 2020-09-04 RX ADMIN — Medication 50 MILLIGRAM(S): at 05:28

## 2020-09-04 RX ADMIN — Medication 25 MILLIGRAM(S): at 13:46

## 2020-09-04 RX ADMIN — OXYCODONE HYDROCHLORIDE 2.5 MILLIGRAM(S): 5 TABLET ORAL at 15:22

## 2020-09-04 RX ADMIN — Medication 27.5 MILLIGRAM(S): at 05:28

## 2020-09-04 RX ADMIN — Medication 30 MILLIGRAM(S): at 06:48

## 2020-09-04 RX ADMIN — Medication 27.5 MILLIGRAM(S): at 13:20

## 2020-09-04 RX ADMIN — Medication 25 MILLIGRAM(S): at 05:27

## 2020-09-04 NOTE — DISCHARGE NOTE NURSING/CASE MANAGEMENT/SOCIAL WORK - PATIENT PORTAL LINK FT
You can access the FollowMyHealth Patient Portal offered by [facility] by registering at the following website: [email or web address]. By joining [employer]’s FollowMyHealth portal, you will also be able to view your health information using other applications (apps) compatible with our system.

## 2020-09-04 NOTE — DIETITIAN INITIAL EVALUATION ADULT. - DIET TYPE
NH diet order - Regular diet Nursing to continue feeding assistance and encouragement, aspiration precaution

## 2020-09-04 NOTE — DIETITIAN INITIAL EVALUATION ADULT. - PROBLEM SELECTOR PLAN 5
Patient has hx of ICH.  CT head showed CT head was done it is showing stable Intra cranial bleed compared to previous admission  Goal Bp < 140  F/u neurology consult

## 2020-09-04 NOTE — DIETITIAN INITIAL EVALUATION ADULT. - PROBLEM SELECTOR PLAN 3
Patient had a fall at rehab.  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 Continued correlation and follow-up is recommended.  Echo done on August 24, 2020 normal systolic function .  Will monitor the patient on tele.  Fall precautions

## 2020-09-04 NOTE — DIETITIAN INITIAL EVALUATION ADULT. - PROBLEM SELECTOR PLAN 2
patient had a temp of 100.7F with   normal WBC. patients UA was sent and CT abdomen Pelvis and CT head was done.    Patients UA is positive for WBC 11-25 with positive nitrites and leukocyte esterase.  Patient urine and blood Cx were sent and she was given a dose of Zosyn  will start the patient on IV Rocephin  CT chest and abdomen -ve for any acute changes or any signs of infection.  Patient had elevated lactate , trended down to normal.  F/u blood and urine Cx

## 2020-09-04 NOTE — PROGRESS NOTE ADULT - PROVIDER SPECIALTY LIST ADULT
Cardiology
Internal Medicine
Neurology
Internal Medicine

## 2020-09-04 NOTE — DIETITIAN INITIAL EVALUATION ADULT. - FACTORS AFF FOOD INTAKE
s/p fall, syncope & collapse, HTN, breast ca, h/o mastectomy/difficulty chewing/difficulty feeding self/difficulty swallowing/persistent constipation/other (specify)

## 2020-09-04 NOTE — DIETITIAN INITIAL EVALUATION ADULT. - PERTINENT MEDS FT
MEDICATIONS  (STANDING):  amLODIPine   Tablet 10 milliGRAM(s) Oral daily  diphenhydrAMINE 25 milliGRAM(s) Oral every 8 hours  doxazosin 4 milliGRAM(s) Oral at bedtime  lisinopril 40 milliGRAM(s) Oral daily  metoclopramide Injectable 10 milliGRAM(s) IV Push once  metoclopramide Injectable 10 milliGRAM(s) IV Push every 8 hours  metoprolol tartrate 100 milliGRAM(s) Oral two times a day  senna 2 Tablet(s) Oral at bedtime  valproate sodium IVPB 500 milliGRAM(s) IV Intermittent every 8 hours    MEDICATIONS  (PRN):  acetaminophen   Tablet .. 650 milliGRAM(s) Oral every 4 hours PRN Mild Pain (1 - 3)  oxyCODONE    IR 2.5 milliGRAM(s) Oral every 8 hours PRN Moderate Pain (4 - 6)  polyethylene glycol 3350 17 Gram(s) Oral two times a day PRN Constipation

## 2020-09-04 NOTE — DIETITIAN INITIAL EVALUATION ADULT. - OTHER INFO
Patient from [facility]. Visited pt. confused, poor historian, on enhanced supervision, per PCA, pt. ate ~30% of breakfast tray today with feeding assistance, needs encouragement, seen by Speech/Swallow on 9/13/20 & recommendation noted, followed by Neuro/team, d/w RN, skin intact.

## 2020-09-04 NOTE — PROGRESS NOTE ADULT - REASON FOR ADMISSION
Sepsis/ Fall

## 2020-09-04 NOTE — PROGRESS NOTE ADULT - SUBJECTIVE AND OBJECTIVE BOX
Patient denies chest pain or shortness of breath.   Review of systems otherwise (-)  	  acetaminophen   Tablet .. 650 milliGRAM(s) Oral every 4 hours PRN  amLODIPine   Tablet 10 milliGRAM(s) Oral daily  diphenhydrAMINE 25 milliGRAM(s) Oral every 8 hours  doxazosin 4 milliGRAM(s) Oral at bedtime  lisinopril 40 milliGRAM(s) Oral daily  metoclopramide Injectable 10 milliGRAM(s) IV Push once  metoclopramide Injectable 10 milliGRAM(s) IV Push every 8 hours  metoprolol tartrate 50 milliGRAM(s) Oral two times a day  oxyCODONE    IR 2.5 milliGRAM(s) Oral every 8 hours PRN  polyethylene glycol 3350 17 Gram(s) Oral two times a day PRN  senna 2 Tablet(s) Oral at bedtime  valproate sodium IVPB 500 milliGRAM(s) IV Intermittent every 8 hours                            10.3   7.74  )-----------( 337      ( 03 Sep 2020 07:13 )             32.6       Hemoglobin: 10.3 g/dL (09-03 @ 07:13)  Hemoglobin: 10.4 g/dL (09-02 @ 07:09)  Hemoglobin: 10.5 g/dL (09-01 @ 06:43)  Hemoglobin: 9.3 g/dL (08-31 @ 01:32)  Hemoglobin: 11.7 g/dL (08-30 @ 13:05)      09-03    138  |  107  |  16  ----------------------------<  86  4.5   |  25  |  1.26    Ca    8.3<L>      03 Sep 2020 07:13  Phos  3.7     09-03  Mg     2.4     09-03      Creatinine Trend: 1.26<--, 0.80<--, 0.61<--, 0.68<--, 0.86<--, 0.78<--    COAGS:           T(C): 36.2 (09-04-20 @ 05:27), Max: 36.7 (09-03-20 @ 20:20)  HR: 71 (09-04-20 @ 05:27) (71 - 90)  BP: 158/63 (09-04-20 @ 05:27) (130/63 - 158/63)  RR: 17 (09-04-20 @ 05:27) (17 - 18)  SpO2: 97% (09-04-20 @ 05:27) (97% - 97%)  Wt(kg): --    I&O's Summary    HEENT:  (-)icterus (-)pallor  CV: N S1 S2 1/6 BETHANY (+)2 Pulses B/l  Resp:  Clear to ausculatation B/L, normal effort  GI: (+) BS Soft, NT, ND  Lymph:  (-)Edema, (-)obvious lymphadenopathy  Skin: Warm to touch, Normal turgor  Psych: Appropriate mood and affect      TELEMETRY: 	  OFF        ASSESSMENT/PLAN: 	87y  Female medical history of HTN and ICH (diagnosed on 23rd August)  was sent in from [facility] rehab after a fall urosepsis preserved LV function incidentally noted with NSVT.    - WIll deffer ischemic eval given recent ICH and DNR / DNI status  - no further NSVT Lopressor 25 mg PO BID  - D/C planning per med    Rafiq Brush MD, [facility]  BEEPER [phone]

## 2020-09-07 LAB
CULTURE RESULTS: SIGNIFICANT CHANGE UP
CULTURE RESULTS: SIGNIFICANT CHANGE UP
SPECIMEN SOURCE: SIGNIFICANT CHANGE UP
SPECIMEN SOURCE: SIGNIFICANT CHANGE UP

## 2020-10-07 LAB
CULTURE RESULTS: SIGNIFICANT CHANGE UP
ORGANISM # SPEC MICROSCOPIC CNT: SIGNIFICANT CHANGE UP
ORGANISM # SPEC MICROSCOPIC CNT: SIGNIFICANT CHANGE UP
SPECIMEN SOURCE: SIGNIFICANT CHANGE UP

## 2021-01-01 ENCOUNTER — APPOINTMENT (OUTPATIENT)
Dept: NEUROSURGERY | Facility: CLINIC | Age: 86
End: 2021-01-01
Payer: MEDICARE

## 2021-01-01 ENCOUNTER — EMERGENCY (EMERGENCY)
Facility: HOSPITAL | Age: 86
LOS: 1 days | Discharge: NURSING, REHAB OR LONG-TERM CARE | End: 2021-01-01
Attending: EMERGENCY MEDICINE | Admitting: EMERGENCY MEDICINE
Payer: MEDICARE

## 2021-01-01 ENCOUNTER — TRANSCRIPTION ENCOUNTER (OUTPATIENT)
Age: 86
End: 2021-01-01

## 2021-01-01 ENCOUNTER — EMERGENCY (EMERGENCY)
Facility: HOSPITAL | Age: 86
LOS: 1 days | Discharge: NURSING, REHAB OR LONG-TERM CARE | End: 2021-01-01
Attending: EMERGENCY MEDICINE | Admitting: EMERGENCY MEDICINE
Payer: MEDICARE

## 2021-01-01 ENCOUNTER — INPATIENT (INPATIENT)
Facility: HOSPITAL | Age: 86
LOS: 5 days | Discharge: HOME | DRG: 87 | End: 2021-10-05
Attending: NEUROLOGICAL SURGERY | Admitting: NEUROLOGICAL SURGERY
Payer: MEDICARE

## 2021-01-01 ENCOUNTER — EMERGENCY (EMERGENCY)
Facility: HOSPITAL | Age: 86
LOS: 1 days | Discharge: ANOTHER HOSPITAL | End: 2021-01-01
Attending: EMERGENCY MEDICINE | Admitting: EMERGENCY MEDICINE
Payer: MEDICARE

## 2021-01-01 VITALS
SYSTOLIC BLOOD PRESSURE: 149 MMHG | OXYGEN SATURATION: 100 % | TEMPERATURE: 99 F | HEART RATE: 71 BPM | RESPIRATION RATE: 16 BRPM | DIASTOLIC BLOOD PRESSURE: 69 MMHG

## 2021-01-01 VITALS
HEART RATE: 70 BPM | DIASTOLIC BLOOD PRESSURE: 75 MMHG | DIASTOLIC BLOOD PRESSURE: 64 MMHG | SYSTOLIC BLOOD PRESSURE: 159 MMHG | TEMPERATURE: 99 F | OXYGEN SATURATION: 96 % | SYSTOLIC BLOOD PRESSURE: 138 MMHG | RESPIRATION RATE: 18 BRPM | OXYGEN SATURATION: 100 % | HEART RATE: 78 BPM | RESPIRATION RATE: 16 BRPM | TEMPERATURE: 98 F

## 2021-01-01 VITALS
OXYGEN SATURATION: 99 % | WEIGHT: 139.99 LBS | HEART RATE: 58 BPM | RESPIRATION RATE: 20 BRPM | SYSTOLIC BLOOD PRESSURE: 114 MMHG | TEMPERATURE: 98 F | DIASTOLIC BLOOD PRESSURE: 57 MMHG | HEIGHT: 63 IN

## 2021-01-01 VITALS
DIASTOLIC BLOOD PRESSURE: 72 MMHG | HEART RATE: 70 BPM | SYSTOLIC BLOOD PRESSURE: 148 MMHG | RESPIRATION RATE: 18 BRPM | OXYGEN SATURATION: 98 % | WEIGHT: 132.28 LBS | HEIGHT: 63 IN | TEMPERATURE: 98 F

## 2021-01-01 VITALS
HEART RATE: 55 BPM | TEMPERATURE: 97.2 F | SYSTOLIC BLOOD PRESSURE: 143 MMHG | OXYGEN SATURATION: 96 % | DIASTOLIC BLOOD PRESSURE: 91 MMHG

## 2021-01-01 VITALS
HEART RATE: 65 BPM | DIASTOLIC BLOOD PRESSURE: 58 MMHG | TEMPERATURE: 98 F | OXYGEN SATURATION: 98 % | RESPIRATION RATE: 15 BRPM | SYSTOLIC BLOOD PRESSURE: 108 MMHG

## 2021-01-01 VITALS
WEIGHT: 110.01 LBS | OXYGEN SATURATION: 97 % | TEMPERATURE: 99 F | HEIGHT: 63 IN | HEART RATE: 84 BPM | SYSTOLIC BLOOD PRESSURE: 161 MMHG | RESPIRATION RATE: 17 BRPM | DIASTOLIC BLOOD PRESSURE: 98 MMHG

## 2021-01-01 VITALS
TEMPERATURE: 98 F | OXYGEN SATURATION: 93 % | RESPIRATION RATE: 16 BRPM | DIASTOLIC BLOOD PRESSURE: 79 MMHG | HEART RATE: 86 BPM | SYSTOLIC BLOOD PRESSURE: 139 MMHG

## 2021-01-01 VITALS
WEIGHT: 117.51 LBS | SYSTOLIC BLOOD PRESSURE: 136 MMHG | HEIGHT: 63 IN | OXYGEN SATURATION: 99 % | HEART RATE: 90 BPM | TEMPERATURE: 100 F | DIASTOLIC BLOOD PRESSURE: 92 MMHG | RESPIRATION RATE: 18 BRPM

## 2021-01-01 VITALS
TEMPERATURE: 98 F | RESPIRATION RATE: 16 BRPM | OXYGEN SATURATION: 98 % | WEIGHT: 132.06 LBS | HEART RATE: 77 BPM | SYSTOLIC BLOOD PRESSURE: 137 MMHG | DIASTOLIC BLOOD PRESSURE: 74 MMHG | HEIGHT: 63 IN

## 2021-01-01 VITALS
HEART RATE: 80 BPM | SYSTOLIC BLOOD PRESSURE: 139 MMHG | TEMPERATURE: 97 F | OXYGEN SATURATION: 96 % | RESPIRATION RATE: 16 BRPM | DIASTOLIC BLOOD PRESSURE: 64 MMHG

## 2021-01-01 VITALS
DIASTOLIC BLOOD PRESSURE: 74 MMHG | OXYGEN SATURATION: 95 % | HEIGHT: 63 IN | RESPIRATION RATE: 16 BRPM | TEMPERATURE: 98 F | HEART RATE: 76 BPM | WEIGHT: 130.07 LBS | SYSTOLIC BLOOD PRESSURE: 145 MMHG

## 2021-01-01 VITALS
DIASTOLIC BLOOD PRESSURE: 70 MMHG | RESPIRATION RATE: 20 BRPM | OXYGEN SATURATION: 97 % | TEMPERATURE: 99 F | HEART RATE: 90 BPM | SYSTOLIC BLOOD PRESSURE: 156 MMHG

## 2021-01-01 VITALS — HEIGHT: 63 IN

## 2021-01-01 DIAGNOSIS — Z98.890 OTHER SPECIFIED POSTPROCEDURAL STATES: Chronic | ICD-10-CM

## 2021-01-01 DIAGNOSIS — I61.9 NONTRAUMATIC INTRACEREBRAL HEMORRHAGE, UNSPECIFIED: ICD-10-CM

## 2021-01-01 DIAGNOSIS — Z86.79 PERSONAL HISTORY OF OTHER DISEASES OF THE CIRCULATORY SYSTEM: ICD-10-CM

## 2021-01-01 DIAGNOSIS — Z78.9 OTHER SPECIFIED HEALTH STATUS: ICD-10-CM

## 2021-01-01 DIAGNOSIS — I62.9 NONTRAUMATIC INTRACRANIAL HEMORRHAGE, UNSPECIFIED: ICD-10-CM

## 2021-01-01 LAB
-  AMIKACIN: SIGNIFICANT CHANGE UP
-  AMOXICILLIN/CLAVULANIC ACID: SIGNIFICANT CHANGE UP
-  AMPICILLIN/SULBACTAM: SIGNIFICANT CHANGE UP
-  AMPICILLIN: SIGNIFICANT CHANGE UP
-  AZTREONAM: SIGNIFICANT CHANGE UP
-  CEFAZOLIN: SIGNIFICANT CHANGE UP
-  CEFEPIME: SIGNIFICANT CHANGE UP
-  CEFOXITIN: SIGNIFICANT CHANGE UP
-  CEFTRIAXONE: SIGNIFICANT CHANGE UP
-  CIPROFLOXACIN: SIGNIFICANT CHANGE UP
-  ERTAPENEM: SIGNIFICANT CHANGE UP
-  GENTAMICIN: SIGNIFICANT CHANGE UP
-  IMIPENEM: SIGNIFICANT CHANGE UP
-  LEVOFLOXACIN: SIGNIFICANT CHANGE UP
-  MEROPENEM: SIGNIFICANT CHANGE UP
-  NITROFURANTOIN: SIGNIFICANT CHANGE UP
-  PIPERACILLIN/TAZOBACTAM: SIGNIFICANT CHANGE UP
-  TIGECYCLINE: SIGNIFICANT CHANGE UP
-  TOBRAMYCIN: SIGNIFICANT CHANGE UP
-  TRIMETHOPRIM/SULFAMETHOXAZOLE: SIGNIFICANT CHANGE UP
ALBUMIN SERPL ELPH-MCNC: 2.5 G/DL — LOW (ref 3.3–5)
ALBUMIN SERPL ELPH-MCNC: 2.7 G/DL — LOW (ref 3.3–5)
ALBUMIN SERPL ELPH-MCNC: 2.9 G/DL — LOW (ref 3.3–5)
ALBUMIN SERPL ELPH-MCNC: 3 G/DL — LOW (ref 3.3–5)
ALBUMIN SERPL ELPH-MCNC: 3.6 G/DL — SIGNIFICANT CHANGE UP (ref 3.3–5)
ALP SERPL-CCNC: 61 U/L — SIGNIFICANT CHANGE UP (ref 30–120)
ALP SERPL-CCNC: 64 U/L — SIGNIFICANT CHANGE UP (ref 30–120)
ALP SERPL-CCNC: 69 U/L — SIGNIFICANT CHANGE UP (ref 30–120)
ALP SERPL-CCNC: 73 U/L — SIGNIFICANT CHANGE UP (ref 30–120)
ALP SERPL-CCNC: 84 U/L — SIGNIFICANT CHANGE UP (ref 30–120)
ALT FLD-CCNC: 16 U/L DA — SIGNIFICANT CHANGE UP (ref 10–60)
ALT FLD-CCNC: 20 U/L DA — SIGNIFICANT CHANGE UP (ref 10–60)
ALT FLD-CCNC: 20 U/L DA — SIGNIFICANT CHANGE UP (ref 10–60)
ALT FLD-CCNC: 23 U/L DA — SIGNIFICANT CHANGE UP (ref 10–60)
ALT FLD-CCNC: 40 U/L DA — SIGNIFICANT CHANGE UP (ref 10–60)
ANION GAP SERPL CALC-SCNC: 6 MMOL/L — SIGNIFICANT CHANGE UP (ref 5–17)
ANION GAP SERPL CALC-SCNC: 8 MMOL/L — SIGNIFICANT CHANGE UP (ref 5–17)
ANION GAP SERPL CALC-SCNC: 9 MMOL/L — SIGNIFICANT CHANGE UP (ref 5–17)
APPEARANCE UR: CLEAR — SIGNIFICANT CHANGE UP
APPEARANCE UR: CLEAR — SIGNIFICANT CHANGE UP
APTT BLD: 24.9 SEC — LOW (ref 27.5–35.5)
APTT BLD: 35.6 SEC — HIGH (ref 27.5–35.5)
APTT BLD: 36.1 SEC — HIGH (ref 27.5–35.5)
APTT BLD: 37.6 SEC — HIGH (ref 27.5–35.5)
APTT BLD: 38.2 SEC — HIGH (ref 27.5–35.5)
AST SERPL-CCNC: 12 U/L — SIGNIFICANT CHANGE UP (ref 10–40)
AST SERPL-CCNC: 23 U/L — SIGNIFICANT CHANGE UP (ref 10–40)
AST SERPL-CCNC: 24 U/L — SIGNIFICANT CHANGE UP (ref 10–40)
AST SERPL-CCNC: 25 U/L — SIGNIFICANT CHANGE UP (ref 10–40)
AST SERPL-CCNC: 37 U/L — SIGNIFICANT CHANGE UP (ref 10–40)
BASOPHILS # BLD AUTO: 0 K/UL — SIGNIFICANT CHANGE UP (ref 0–0.2)
BASOPHILS # BLD AUTO: 0.01 K/UL — SIGNIFICANT CHANGE UP (ref 0–0.2)
BASOPHILS # BLD AUTO: 0.02 K/UL — SIGNIFICANT CHANGE UP (ref 0–0.2)
BASOPHILS # BLD AUTO: 0.03 K/UL — SIGNIFICANT CHANGE UP (ref 0–0.2)
BASOPHILS # BLD AUTO: 0.05 K/UL — SIGNIFICANT CHANGE UP (ref 0–0.2)
BASOPHILS NFR BLD AUTO: 0 % — SIGNIFICANT CHANGE UP (ref 0–2)
BASOPHILS NFR BLD AUTO: 0.2 % — SIGNIFICANT CHANGE UP (ref 0–2)
BASOPHILS NFR BLD AUTO: 0.3 % — SIGNIFICANT CHANGE UP (ref 0–2)
BASOPHILS NFR BLD AUTO: 0.5 % — SIGNIFICANT CHANGE UP (ref 0–2)
BASOPHILS NFR BLD AUTO: 0.5 % — SIGNIFICANT CHANGE UP (ref 0–2)
BILIRUB SERPL-MCNC: 0.2 MG/DL — SIGNIFICANT CHANGE UP (ref 0.2–1.2)
BILIRUB SERPL-MCNC: 0.4 MG/DL — SIGNIFICANT CHANGE UP (ref 0.2–1.2)
BILIRUB SERPL-MCNC: 0.5 MG/DL — SIGNIFICANT CHANGE UP (ref 0.2–1.2)
BILIRUB SERPL-MCNC: 0.5 MG/DL — SIGNIFICANT CHANGE UP (ref 0.2–1.2)
BILIRUB SERPL-MCNC: 0.6 MG/DL — SIGNIFICANT CHANGE UP (ref 0.2–1.2)
BILIRUB UR-MCNC: NEGATIVE — SIGNIFICANT CHANGE UP
BILIRUB UR-MCNC: NEGATIVE — SIGNIFICANT CHANGE UP
BUN SERPL-MCNC: 18 MG/DL — SIGNIFICANT CHANGE UP (ref 7–23)
BUN SERPL-MCNC: 23 MG/DL — SIGNIFICANT CHANGE UP (ref 7–23)
BUN SERPL-MCNC: 26 MG/DL — HIGH (ref 7–23)
BUN SERPL-MCNC: 26 MG/DL — HIGH (ref 7–23)
BUN SERPL-MCNC: 30 MG/DL — HIGH (ref 7–23)
CALCIUM SERPL-MCNC: 8.4 MG/DL — SIGNIFICANT CHANGE UP (ref 8.4–10.5)
CALCIUM SERPL-MCNC: 8.5 MG/DL — SIGNIFICANT CHANGE UP (ref 8.4–10.5)
CALCIUM SERPL-MCNC: 8.5 MG/DL — SIGNIFICANT CHANGE UP (ref 8.4–10.5)
CALCIUM SERPL-MCNC: 8.8 MG/DL — SIGNIFICANT CHANGE UP (ref 8.4–10.5)
CALCIUM SERPL-MCNC: 9.3 MG/DL — SIGNIFICANT CHANGE UP (ref 8.4–10.5)
CHLORIDE SERPL-SCNC: 101 MMOL/L — SIGNIFICANT CHANGE UP (ref 96–108)
CHLORIDE SERPL-SCNC: 101 MMOL/L — SIGNIFICANT CHANGE UP (ref 96–108)
CHLORIDE SERPL-SCNC: 104 MMOL/L — SIGNIFICANT CHANGE UP (ref 96–108)
CHLORIDE SERPL-SCNC: 108 MMOL/L — SIGNIFICANT CHANGE UP (ref 96–108)
CHLORIDE SERPL-SCNC: 99 MMOL/L — SIGNIFICANT CHANGE UP (ref 96–108)
CO2 SERPL-SCNC: 27 MMOL/L — SIGNIFICANT CHANGE UP (ref 22–31)
CO2 SERPL-SCNC: 27 MMOL/L — SIGNIFICANT CHANGE UP (ref 22–31)
CO2 SERPL-SCNC: 28 MMOL/L — SIGNIFICANT CHANGE UP (ref 22–31)
CO2 SERPL-SCNC: 28 MMOL/L — SIGNIFICANT CHANGE UP (ref 22–31)
CO2 SERPL-SCNC: 29 MMOL/L — SIGNIFICANT CHANGE UP (ref 22–31)
COLOR SPEC: YELLOW — SIGNIFICANT CHANGE UP
COLOR SPEC: YELLOW — SIGNIFICANT CHANGE UP
COVID-19 SPIKE DOMAIN AB INTERP: POSITIVE
COVID-19 SPIKE DOMAIN ANTIBODY RESULT: >250 U/ML — HIGH
CREAT SERPL-MCNC: 0.81 MG/DL — SIGNIFICANT CHANGE UP (ref 0.5–1.3)
CREAT SERPL-MCNC: 0.94 MG/DL — SIGNIFICANT CHANGE UP (ref 0.5–1.3)
CREAT SERPL-MCNC: 1 MG/DL — SIGNIFICANT CHANGE UP (ref 0.5–1.3)
CREAT SERPL-MCNC: 1.11 MG/DL — SIGNIFICANT CHANGE UP (ref 0.5–1.3)
CREAT SERPL-MCNC: 1.34 MG/DL — HIGH (ref 0.5–1.3)
CULTURE RESULTS: NO GROWTH — SIGNIFICANT CHANGE UP
CULTURE RESULTS: SIGNIFICANT CHANGE UP
DIFF PNL FLD: ABNORMAL
DIFF PNL FLD: NEGATIVE — SIGNIFICANT CHANGE UP
EOSINOPHIL # BLD AUTO: 0.03 K/UL — SIGNIFICANT CHANGE UP (ref 0–0.5)
EOSINOPHIL # BLD AUTO: 0.07 K/UL — SIGNIFICANT CHANGE UP (ref 0–0.5)
EOSINOPHIL # BLD AUTO: 0.11 K/UL — SIGNIFICANT CHANGE UP (ref 0–0.5)
EOSINOPHIL # BLD AUTO: 0.12 K/UL — SIGNIFICANT CHANGE UP (ref 0–0.5)
EOSINOPHIL # BLD AUTO: 0.18 K/UL — SIGNIFICANT CHANGE UP (ref 0–0.5)
EOSINOPHIL NFR BLD AUTO: 0.4 % — SIGNIFICANT CHANGE UP (ref 0–6)
EOSINOPHIL NFR BLD AUTO: 1.1 % — SIGNIFICANT CHANGE UP (ref 0–6)
EOSINOPHIL NFR BLD AUTO: 1.8 % — SIGNIFICANT CHANGE UP (ref 0–6)
EOSINOPHIL NFR BLD AUTO: 1.9 % — SIGNIFICANT CHANGE UP (ref 0–6)
EOSINOPHIL NFR BLD AUTO: 2 % — SIGNIFICANT CHANGE UP (ref 0–6)
GLUCOSE SERPL-MCNC: 102 MG/DL — HIGH (ref 70–99)
GLUCOSE SERPL-MCNC: 104 MG/DL — HIGH (ref 70–99)
GLUCOSE SERPL-MCNC: 108 MG/DL — HIGH (ref 70–99)
GLUCOSE SERPL-MCNC: 108 MG/DL — HIGH (ref 70–99)
GLUCOSE SERPL-MCNC: 99 MG/DL — SIGNIFICANT CHANGE UP (ref 70–99)
GLUCOSE UR QL: NEGATIVE MG/DL — SIGNIFICANT CHANGE UP
GLUCOSE UR QL: NEGATIVE MG/DL — SIGNIFICANT CHANGE UP
HCT VFR BLD CALC: 34.8 % — SIGNIFICANT CHANGE UP (ref 34.5–45)
HCT VFR BLD CALC: 35.5 % — SIGNIFICANT CHANGE UP (ref 34.5–45)
HCT VFR BLD CALC: 35.9 % — SIGNIFICANT CHANGE UP (ref 34.5–45)
HCT VFR BLD CALC: 36.6 % — SIGNIFICANT CHANGE UP (ref 34.5–45)
HCT VFR BLD CALC: 42.5 % — SIGNIFICANT CHANGE UP (ref 34.5–45)
HGB BLD-MCNC: 11.2 G/DL — LOW (ref 11.5–15.5)
HGB BLD-MCNC: 11.3 G/DL — LOW (ref 11.5–15.5)
HGB BLD-MCNC: 13.3 G/DL — SIGNIFICANT CHANGE UP (ref 11.5–15.5)
IMM GRANULOCYTES NFR BLD AUTO: 0.3 % — SIGNIFICANT CHANGE UP (ref 0–1.5)
IMM GRANULOCYTES NFR BLD AUTO: 0.5 % — SIGNIFICANT CHANGE UP (ref 0–1.5)
IMM GRANULOCYTES NFR BLD AUTO: 0.6 % — SIGNIFICANT CHANGE UP (ref 0–1.5)
IMM GRANULOCYTES NFR BLD AUTO: 0.8 % — SIGNIFICANT CHANGE UP (ref 0–1.5)
INR BLD: 0.95 RATIO — SIGNIFICANT CHANGE UP (ref 0.88–1.16)
INR BLD: 1 RATIO — SIGNIFICANT CHANGE UP (ref 0.88–1.16)
INR BLD: 1.04 RATIO — SIGNIFICANT CHANGE UP (ref 0.88–1.16)
INR BLD: 1.04 RATIO — SIGNIFICANT CHANGE UP (ref 0.88–1.16)
INR BLD: 1.06 RATIO — SIGNIFICANT CHANGE UP (ref 0.88–1.16)
KETONES UR-MCNC: ABNORMAL
KETONES UR-MCNC: NEGATIVE — SIGNIFICANT CHANGE UP
LACTATE SERPL-SCNC: 1.1 MMOL/L — SIGNIFICANT CHANGE UP (ref 0.7–2)
LACTATE SERPL-SCNC: 1.5 MMOL/L — SIGNIFICANT CHANGE UP (ref 0.7–2)
LEUKOCYTE ESTERASE UR-ACNC: ABNORMAL
LEUKOCYTE ESTERASE UR-ACNC: ABNORMAL
LIDOCAIN IGE QN: 23 U/L — LOW (ref 73–393)
LIDOCAIN IGE QN: 25 U/L — LOW (ref 73–393)
LIDOCAIN IGE QN: 29 U/L — LOW (ref 73–393)
LIDOCAIN IGE QN: 65 U/L — LOW (ref 73–393)
LIDOCAIN IGE QN: 74 U/L — SIGNIFICANT CHANGE UP (ref 73–393)
LYMPHOCYTES # BLD AUTO: 1.04 K/UL — SIGNIFICANT CHANGE UP (ref 1–3.3)
LYMPHOCYTES # BLD AUTO: 1.06 K/UL — SIGNIFICANT CHANGE UP (ref 1–3.3)
LYMPHOCYTES # BLD AUTO: 1.35 K/UL — SIGNIFICANT CHANGE UP (ref 1–3.3)
LYMPHOCYTES # BLD AUTO: 1.64 K/UL — SIGNIFICANT CHANGE UP (ref 1–3.3)
LYMPHOCYTES # BLD AUTO: 14.1 % — SIGNIFICANT CHANGE UP (ref 13–44)
LYMPHOCYTES # BLD AUTO: 15 % — SIGNIFICANT CHANGE UP (ref 13–44)
LYMPHOCYTES # BLD AUTO: 19 % — SIGNIFICANT CHANGE UP (ref 13–44)
LYMPHOCYTES # BLD AUTO: 2.06 K/UL — SIGNIFICANT CHANGE UP (ref 1–3.3)
LYMPHOCYTES # BLD AUTO: 25.4 % — SIGNIFICANT CHANGE UP (ref 13–44)
LYMPHOCYTES # BLD AUTO: 31 % — SIGNIFICANT CHANGE UP (ref 13–44)
MCHC RBC-ENTMCNC: 28.7 PG — SIGNIFICANT CHANGE UP (ref 27–34)
MCHC RBC-ENTMCNC: 28.7 PG — SIGNIFICANT CHANGE UP (ref 27–34)
MCHC RBC-ENTMCNC: 28.8 PG — SIGNIFICANT CHANGE UP (ref 27–34)
MCHC RBC-ENTMCNC: 28.8 PG — SIGNIFICANT CHANGE UP (ref 27–34)
MCHC RBC-ENTMCNC: 29 PG — SIGNIFICANT CHANGE UP (ref 27–34)
MCHC RBC-ENTMCNC: 30.6 GM/DL — LOW (ref 32–36)
MCHC RBC-ENTMCNC: 31.3 GM/DL — LOW (ref 32–36)
MCHC RBC-ENTMCNC: 31.5 GM/DL — LOW (ref 32–36)
MCHC RBC-ENTMCNC: 31.5 GM/DL — LOW (ref 32–36)
MCHC RBC-ENTMCNC: 32.2 GM/DL — SIGNIFICANT CHANGE UP (ref 32–36)
MCV RBC AUTO: 90.2 FL — SIGNIFICANT CHANGE UP (ref 80–100)
MCV RBC AUTO: 91 FL — SIGNIFICANT CHANGE UP (ref 80–100)
MCV RBC AUTO: 91.3 FL — SIGNIFICANT CHANGE UP (ref 80–100)
MCV RBC AUTO: 92 FL — SIGNIFICANT CHANGE UP (ref 80–100)
MCV RBC AUTO: 93.8 FL — SIGNIFICANT CHANGE UP (ref 80–100)
METHOD TYPE: SIGNIFICANT CHANGE UP
MONOCYTES # BLD AUTO: 0.57 K/UL — SIGNIFICANT CHANGE UP (ref 0–0.9)
MONOCYTES # BLD AUTO: 0.57 K/UL — SIGNIFICANT CHANGE UP (ref 0–0.9)
MONOCYTES # BLD AUTO: 0.7 K/UL — SIGNIFICANT CHANGE UP (ref 0–0.9)
MONOCYTES # BLD AUTO: 0.95 K/UL — HIGH (ref 0–0.9)
MONOCYTES # BLD AUTO: 1.11 K/UL — HIGH (ref 0–0.9)
MONOCYTES NFR BLD AUTO: 10.1 % — SIGNIFICANT CHANGE UP (ref 2–14)
MONOCYTES NFR BLD AUTO: 11.6 % — SIGNIFICANT CHANGE UP (ref 2–14)
MONOCYTES NFR BLD AUTO: 17 % — HIGH (ref 2–14)
MONOCYTES NFR BLD AUTO: 8.6 % — SIGNIFICANT CHANGE UP (ref 2–14)
MONOCYTES NFR BLD AUTO: 8.8 % — SIGNIFICANT CHANGE UP (ref 2–14)
NEUTROPHILS # BLD AUTO: 3.41 K/UL — SIGNIFICANT CHANGE UP (ref 1.8–7.4)
NEUTROPHILS # BLD AUTO: 3.83 K/UL — SIGNIFICANT CHANGE UP (ref 1.8–7.4)
NEUTROPHILS # BLD AUTO: 4.15 K/UL — SIGNIFICANT CHANGE UP (ref 1.8–7.4)
NEUTROPHILS # BLD AUTO: 5.1 K/UL — SIGNIFICANT CHANGE UP (ref 1.8–7.4)
NEUTROPHILS # BLD AUTO: 6.78 K/UL — SIGNIFICANT CHANGE UP (ref 1.8–7.4)
NEUTROPHILS NFR BLD AUTO: 57.6 % — SIGNIFICANT CHANGE UP (ref 43–77)
NEUTROPHILS NFR BLD AUTO: 58 % — SIGNIFICANT CHANGE UP (ref 43–77)
NEUTROPHILS NFR BLD AUTO: 64.2 % — SIGNIFICANT CHANGE UP (ref 43–77)
NEUTROPHILS NFR BLD AUTO: 71.1 % — SIGNIFICANT CHANGE UP (ref 43–77)
NEUTROPHILS NFR BLD AUTO: 73.6 % — SIGNIFICANT CHANGE UP (ref 43–77)
NITRITE UR-MCNC: NEGATIVE — SIGNIFICANT CHANGE UP
NITRITE UR-MCNC: POSITIVE
NRBC # BLD: 0 /100 WBCS — SIGNIFICANT CHANGE UP (ref 0–0)
NRBC # BLD: SIGNIFICANT CHANGE UP /100 WBCS (ref 0–0)
ORGANISM # SPEC MICROSCOPIC CNT: SIGNIFICANT CHANGE UP
ORGANISM # SPEC MICROSCOPIC CNT: SIGNIFICANT CHANGE UP
PH UR: 6 — SIGNIFICANT CHANGE UP (ref 5–8)
PH UR: 6 — SIGNIFICANT CHANGE UP (ref 5–8)
PLATELET # BLD AUTO: 112 K/UL — LOW (ref 150–400)
PLATELET # BLD AUTO: 197 K/UL — SIGNIFICANT CHANGE UP (ref 150–400)
PLATELET # BLD AUTO: 251 K/UL — SIGNIFICANT CHANGE UP (ref 150–400)
PLATELET # BLD AUTO: 288 K/UL — SIGNIFICANT CHANGE UP (ref 150–400)
PLATELET # BLD AUTO: 315 K/UL — SIGNIFICANT CHANGE UP (ref 150–400)
POTASSIUM SERPL-MCNC: 3.9 MMOL/L — SIGNIFICANT CHANGE UP (ref 3.5–5.3)
POTASSIUM SERPL-MCNC: 4.1 MMOL/L — SIGNIFICANT CHANGE UP (ref 3.5–5.3)
POTASSIUM SERPL-MCNC: 4.2 MMOL/L — SIGNIFICANT CHANGE UP (ref 3.5–5.3)
POTASSIUM SERPL-MCNC: 4.4 MMOL/L — SIGNIFICANT CHANGE UP (ref 3.5–5.3)
POTASSIUM SERPL-MCNC: 4.6 MMOL/L — SIGNIFICANT CHANGE UP (ref 3.5–5.3)
POTASSIUM SERPL-SCNC: 3.9 MMOL/L — SIGNIFICANT CHANGE UP (ref 3.5–5.3)
POTASSIUM SERPL-SCNC: 4.1 MMOL/L — SIGNIFICANT CHANGE UP (ref 3.5–5.3)
POTASSIUM SERPL-SCNC: 4.2 MMOL/L — SIGNIFICANT CHANGE UP (ref 3.5–5.3)
POTASSIUM SERPL-SCNC: 4.4 MMOL/L — SIGNIFICANT CHANGE UP (ref 3.5–5.3)
POTASSIUM SERPL-SCNC: 4.6 MMOL/L — SIGNIFICANT CHANGE UP (ref 3.5–5.3)
PROT SERPL-MCNC: 6.7 G/DL — SIGNIFICANT CHANGE UP (ref 6–8.3)
PROT SERPL-MCNC: 6.7 G/DL — SIGNIFICANT CHANGE UP (ref 6–8.3)
PROT SERPL-MCNC: 6.9 G/DL — SIGNIFICANT CHANGE UP (ref 6–8.3)
PROT SERPL-MCNC: 7 G/DL — SIGNIFICANT CHANGE UP (ref 6–8.3)
PROT SERPL-MCNC: 8.1 G/DL — SIGNIFICANT CHANGE UP (ref 6–8.3)
PROT UR-MCNC: 30 MG/DL
PROT UR-MCNC: NEGATIVE MG/DL — SIGNIFICANT CHANGE UP
PROTHROM AB SERPL-ACNC: 11.5 SEC — SIGNIFICANT CHANGE UP (ref 10.6–13.6)
PROTHROM AB SERPL-ACNC: 12 SEC — SIGNIFICANT CHANGE UP (ref 10.6–13.6)
PROTHROM AB SERPL-ACNC: 12.1 SEC — SIGNIFICANT CHANGE UP (ref 10.6–13.6)
PROTHROM AB SERPL-ACNC: 12.6 SEC — SIGNIFICANT CHANGE UP (ref 10.6–13.6)
PROTHROM AB SERPL-ACNC: 12.8 SEC — SIGNIFICANT CHANGE UP (ref 10.6–13.6)
RAPID RVP RESULT: SIGNIFICANT CHANGE UP
RBC # BLD: 3.86 M/UL — SIGNIFICANT CHANGE UP (ref 3.8–5.2)
RBC # BLD: 3.9 M/UL — SIGNIFICANT CHANGE UP (ref 3.8–5.2)
RBC # BLD: 3.9 M/UL — SIGNIFICANT CHANGE UP (ref 3.8–5.2)
RBC # BLD: 3.93 M/UL — SIGNIFICANT CHANGE UP (ref 3.8–5.2)
RBC # BLD: 4.62 M/UL — SIGNIFICANT CHANGE UP (ref 3.8–5.2)
RBC # FLD: 14.4 % — SIGNIFICANT CHANGE UP (ref 10.3–14.5)
RBC # FLD: 14.6 % — HIGH (ref 10.3–14.5)
RBC # FLD: 15.9 % — HIGH (ref 10.3–14.5)
RBC # FLD: 16.5 % — HIGH (ref 10.3–14.5)
RBC # FLD: 16.6 % — HIGH (ref 10.3–14.5)
SARS-COV-2 IGG+IGM SERPL QL IA: >250 U/ML — HIGH
SARS-COV-2 IGG+IGM SERPL QL IA: POSITIVE
SARS-COV-2 RNA SPEC QL NAA+PROBE: SIGNIFICANT CHANGE UP
SODIUM SERPL-SCNC: 135 MMOL/L — SIGNIFICANT CHANGE UP (ref 135–145)
SODIUM SERPL-SCNC: 137 MMOL/L — SIGNIFICANT CHANGE UP (ref 135–145)
SODIUM SERPL-SCNC: 138 MMOL/L — SIGNIFICANT CHANGE UP (ref 135–145)
SODIUM SERPL-SCNC: 139 MMOL/L — SIGNIFICANT CHANGE UP (ref 135–145)
SODIUM SERPL-SCNC: 142 MMOL/L — SIGNIFICANT CHANGE UP (ref 135–145)
SP GR SPEC: 1.01 — SIGNIFICANT CHANGE UP (ref 1.01–1.02)
SP GR SPEC: 1.01 — SIGNIFICANT CHANGE UP (ref 1.01–1.02)
SPECIMEN SOURCE: SIGNIFICANT CHANGE UP
TROPONIN I, HIGH SENSITIVITY RESULT: 7.8 NG/L — SIGNIFICANT CHANGE UP
UROBILINOGEN FLD QL: 1 MG/DL
UROBILINOGEN FLD QL: 1 MG/DL
VALPROATE SERPL-MCNC: 54 UG/ML — SIGNIFICANT CHANGE UP (ref 50–100)
VALPROATE SERPL-MCNC: 66 UG/ML — SIGNIFICANT CHANGE UP (ref 50–100)
WBC # BLD: 5.59 K/UL — SIGNIFICANT CHANGE UP (ref 3.8–10.5)
WBC # BLD: 6.46 K/UL — SIGNIFICANT CHANGE UP (ref 3.8–10.5)
WBC # BLD: 6.64 K/UL — SIGNIFICANT CHANGE UP (ref 3.8–10.5)
WBC # BLD: 6.93 K/UL — SIGNIFICANT CHANGE UP (ref 3.8–10.5)
WBC # BLD: 9.55 K/UL — SIGNIFICANT CHANGE UP (ref 3.8–10.5)
WBC # FLD AUTO: 5.59 K/UL — SIGNIFICANT CHANGE UP (ref 3.8–10.5)
WBC # FLD AUTO: 6.46 K/UL — SIGNIFICANT CHANGE UP (ref 3.8–10.5)
WBC # FLD AUTO: 6.64 K/UL — SIGNIFICANT CHANGE UP (ref 3.8–10.5)
WBC # FLD AUTO: 6.93 K/UL — SIGNIFICANT CHANGE UP (ref 3.8–10.5)
WBC # FLD AUTO: 9.55 K/UL — SIGNIFICANT CHANGE UP (ref 3.8–10.5)

## 2021-01-01 PROCEDURE — 83605 ASSAY OF LACTIC ACID: CPT

## 2021-01-01 PROCEDURE — 70450 CT HEAD/BRAIN W/O DYE: CPT | Mod: 26,MA

## 2021-01-01 PROCEDURE — 99221 1ST HOSP IP/OBS SF/LOW 40: CPT

## 2021-01-01 PROCEDURE — 70450 CT HEAD/BRAIN W/O DYE: CPT | Mod: 26

## 2021-01-01 PROCEDURE — 85025 COMPLETE CBC W/AUTO DIFF WBC: CPT

## 2021-01-01 PROCEDURE — 81001 URINALYSIS AUTO W/SCOPE: CPT

## 2021-01-01 PROCEDURE — 80164 ASSAY DIPROPYLACETIC ACD TOT: CPT

## 2021-01-01 PROCEDURE — 74177 CT ABD & PELVIS W/CONTRAST: CPT | Mod: MA

## 2021-01-01 PROCEDURE — 99232 SBSQ HOSP IP/OBS MODERATE 35: CPT

## 2021-01-01 PROCEDURE — 74177 CT ABD & PELVIS W/CONTRAST: CPT | Mod: 26,MA

## 2021-01-01 PROCEDURE — 96375 TX/PRO/DX INJ NEW DRUG ADDON: CPT

## 2021-01-01 PROCEDURE — 86850 RBC ANTIBODY SCREEN: CPT

## 2021-01-01 PROCEDURE — 70486 CT MAXILLOFACIAL W/O DYE: CPT | Mod: 26,MA

## 2021-01-01 PROCEDURE — 36415 COLL VENOUS BLD VENIPUNCTURE: CPT

## 2021-01-01 PROCEDURE — 80053 COMPREHEN METABOLIC PANEL: CPT

## 2021-01-01 PROCEDURE — 71045 X-RAY EXAM CHEST 1 VIEW: CPT | Mod: 26

## 2021-01-01 PROCEDURE — 96374 THER/PROPH/DIAG INJ IV PUSH: CPT

## 2021-01-01 PROCEDURE — 83690 ASSAY OF LIPASE: CPT

## 2021-01-01 PROCEDURE — 99284 EMERGENCY DEPT VISIT MOD MDM: CPT | Mod: 25

## 2021-01-01 PROCEDURE — T1013: CPT

## 2021-01-01 PROCEDURE — U0003: CPT

## 2021-01-01 PROCEDURE — 85610 PROTHROMBIN TIME: CPT

## 2021-01-01 PROCEDURE — 93010 ELECTROCARDIOGRAM REPORT: CPT

## 2021-01-01 PROCEDURE — 93005 ELECTROCARDIOGRAM TRACING: CPT

## 2021-01-01 PROCEDURE — 73030 X-RAY EXAM OF SHOULDER: CPT

## 2021-01-01 PROCEDURE — 99284 EMERGENCY DEPT VISIT MOD MDM: CPT

## 2021-01-01 PROCEDURE — 72125 CT NECK SPINE W/O DYE: CPT | Mod: MA

## 2021-01-01 PROCEDURE — 87086 URINE CULTURE/COLONY COUNT: CPT

## 2021-01-01 PROCEDURE — 85730 THROMBOPLASTIN TIME PARTIAL: CPT

## 2021-01-01 PROCEDURE — 71260 CT THORAX DX C+: CPT | Mod: MA

## 2021-01-01 PROCEDURE — 86901 BLOOD TYPING SEROLOGIC RH(D): CPT

## 2021-01-01 PROCEDURE — 87186 SC STD MICRODIL/AGAR DIL: CPT

## 2021-01-01 PROCEDURE — 84484 ASSAY OF TROPONIN QUANT: CPT

## 2021-01-01 PROCEDURE — 99285 EMERGENCY DEPT VISIT HI MDM: CPT

## 2021-01-01 PROCEDURE — 86900 BLOOD TYPING SEROLOGIC ABO: CPT

## 2021-01-01 PROCEDURE — 99285 EMERGENCY DEPT VISIT HI MDM: CPT | Mod: GC

## 2021-01-01 PROCEDURE — 70450 CT HEAD/BRAIN W/O DYE: CPT

## 2021-01-01 PROCEDURE — 99213 OFFICE O/P EST LOW 20 MIN: CPT

## 2021-01-01 PROCEDURE — 86769 SARS-COV-2 COVID-19 ANTIBODY: CPT

## 2021-01-01 PROCEDURE — 71250 CT THORAX DX C-: CPT | Mod: 26,MA

## 2021-01-01 PROCEDURE — 72170 X-RAY EXAM OF PELVIS: CPT

## 2021-01-01 PROCEDURE — 71045 X-RAY EXAM CHEST 1 VIEW: CPT

## 2021-01-01 PROCEDURE — 72170 X-RAY EXAM OF PELVIS: CPT | Mod: 26

## 2021-01-01 PROCEDURE — 70450 CT HEAD/BRAIN W/O DYE: CPT | Mod: MA

## 2021-01-01 PROCEDURE — 96365 THER/PROPH/DIAG IV INF INIT: CPT | Mod: XU

## 2021-01-01 PROCEDURE — 71260 CT THORAX DX C+: CPT | Mod: 26,MA

## 2021-01-01 PROCEDURE — 70486 CT MAXILLOFACIAL W/O DYE: CPT | Mod: MA

## 2021-01-01 PROCEDURE — 71250 CT THORAX DX C-: CPT

## 2021-01-01 PROCEDURE — 96361 HYDRATE IV INFUSION ADD-ON: CPT

## 2021-01-01 PROCEDURE — 72125 CT NECK SPINE W/O DYE: CPT | Mod: 26,MA

## 2021-01-01 PROCEDURE — 87635 SARS-COV-2 COVID-19 AMP PRB: CPT

## 2021-01-01 PROCEDURE — 82962 GLUCOSE BLOOD TEST: CPT

## 2021-01-01 PROCEDURE — 73030 X-RAY EXAM OF SHOULDER: CPT | Mod: 26,50

## 2021-01-01 PROCEDURE — 99222 1ST HOSP IP/OBS MODERATE 55: CPT

## 2021-01-01 PROCEDURE — 87077 CULTURE AEROBIC IDENTIFY: CPT

## 2021-01-01 PROCEDURE — 0225U NFCT DS DNA&RNA 21 SARSCOV2: CPT

## 2021-01-01 PROCEDURE — 99285 EMERGENCY DEPT VISIT HI MDM: CPT | Mod: 25

## 2021-01-01 PROCEDURE — U0005: CPT

## 2021-01-01 PROCEDURE — 87040 BLOOD CULTURE FOR BACTERIA: CPT

## 2021-01-01 RX ORDER — QUETIAPINE FUMARATE 200 MG/1
1 TABLET, FILM COATED ORAL
Qty: 30 | Refills: 0
Start: 2021-01-01 | End: 2021-01-01

## 2021-01-01 RX ORDER — LISINOPRIL 2.5 MG/1
40 TABLET ORAL DAILY
Refills: 0 | Status: DISCONTINUED | OUTPATIENT
Start: 2021-01-01 | End: 2021-01-01

## 2021-01-01 RX ORDER — SODIUM CHLORIDE 9 MG/ML
250 INJECTION INTRAMUSCULAR; INTRAVENOUS; SUBCUTANEOUS ONCE
Refills: 0 | Status: DISCONTINUED | OUTPATIENT
Start: 2021-01-01 | End: 2021-01-01

## 2021-01-01 RX ORDER — ACETAMINOPHEN 500 MG
650 TABLET ORAL ONCE
Refills: 0 | Status: DISCONTINUED | OUTPATIENT
Start: 2021-01-01 | End: 2021-01-01

## 2021-01-01 RX ORDER — SENNA PLUS 8.6 MG/1
2 TABLET ORAL AT BEDTIME
Refills: 0 | Status: DISCONTINUED | OUTPATIENT
Start: 2021-01-01 | End: 2021-01-01

## 2021-01-01 RX ORDER — AMLODIPINE BESYLATE 2.5 MG/1
10 TABLET ORAL DAILY
Refills: 0 | Status: DISCONTINUED | OUTPATIENT
Start: 2021-01-01 | End: 2021-01-01

## 2021-01-01 RX ORDER — DOXAZOSIN MESYLATE 4 MG
1 TABLET ORAL
Qty: 30 | Refills: 0
Start: 2021-01-01 | End: 2021-01-01

## 2021-01-01 RX ORDER — PREGABALIN 225 MG/1
1 CAPSULE ORAL
Qty: 30 | Refills: 0
Start: 2021-01-01 | End: 2021-01-01

## 2021-01-01 RX ORDER — DIVALPROEX SODIUM 500 MG/1
500 TABLET, DELAYED RELEASE ORAL DAILY
Refills: 0 | Status: DISCONTINUED | OUTPATIENT
Start: 2021-01-01 | End: 2021-01-01

## 2021-01-01 RX ORDER — HALOPERIDOL DECANOATE 100 MG/ML
2 INJECTION INTRAMUSCULAR ONCE
Refills: 0 | Status: COMPLETED | OUTPATIENT
Start: 2021-01-01 | End: 2021-01-01

## 2021-01-01 RX ORDER — ERGOCALCIFEROL 1.25 MG/1
50000 CAPSULE ORAL ONCE
Refills: 0 | Status: DISCONTINUED | OUTPATIENT
Start: 2021-01-01 | End: 2021-01-01

## 2021-01-01 RX ORDER — ACETAMINOPHEN 500 MG
1000 TABLET ORAL ONCE
Refills: 0 | Status: COMPLETED | OUTPATIENT
Start: 2021-01-01 | End: 2021-01-01

## 2021-01-01 RX ORDER — DOXAZOSIN MESYLATE 4 MG
4 TABLET ORAL AT BEDTIME
Refills: 0 | Status: DISCONTINUED | OUTPATIENT
Start: 2021-01-01 | End: 2021-01-01

## 2021-01-01 RX ORDER — MEMANTINE HYDROCHLORIDE 10 MG/1
5 TABLET ORAL
Refills: 0 | Status: DISCONTINUED | OUTPATIENT
Start: 2021-01-01 | End: 2021-01-01

## 2021-01-01 RX ORDER — PREGABALIN 225 MG/1
1000 CAPSULE ORAL DAILY
Refills: 0 | Status: DISCONTINUED | OUTPATIENT
Start: 2021-01-01 | End: 2021-01-01

## 2021-01-01 RX ORDER — QUETIAPINE FUMARATE 200 MG/1
25 TABLET, FILM COATED ORAL AT BEDTIME
Refills: 0 | Status: DISCONTINUED | OUTPATIENT
Start: 2021-01-01 | End: 2021-01-01

## 2021-01-01 RX ORDER — AMLODIPINE BESYLATE 2.5 MG/1
1 TABLET ORAL
Qty: 30 | Refills: 0
Start: 2021-01-01 | End: 2021-01-01

## 2021-01-01 RX ORDER — SODIUM CHLORIDE 9 MG/ML
1000 INJECTION INTRAMUSCULAR; INTRAVENOUS; SUBCUTANEOUS ONCE
Refills: 0 | Status: COMPLETED | OUTPATIENT
Start: 2021-01-01 | End: 2021-01-01

## 2021-01-01 RX ORDER — METOPROLOL TARTRATE 50 MG
5 TABLET ORAL ONCE
Refills: 0 | Status: COMPLETED | OUTPATIENT
Start: 2021-01-01 | End: 2021-01-01

## 2021-01-01 RX ORDER — LISINOPRIL 2.5 MG/1
1 TABLET ORAL
Qty: 30 | Refills: 0
Start: 2021-01-01 | End: 2021-01-01

## 2021-01-01 RX ORDER — QUETIAPINE FUMARATE 200 MG/1
25 TABLET, FILM COATED ORAL ONCE
Refills: 0 | Status: DISCONTINUED | OUTPATIENT
Start: 2021-01-01 | End: 2021-01-01

## 2021-01-01 RX ORDER — ACETAMINOPHEN 500 MG
650 TABLET ORAL ONCE
Refills: 0 | Status: COMPLETED | OUTPATIENT
Start: 2021-01-01 | End: 2021-01-01

## 2021-01-01 RX ORDER — LANOLIN ALCOHOL/MO/W.PET/CERES
5 CREAM (GRAM) TOPICAL AT BEDTIME
Refills: 0 | Status: DISCONTINUED | OUTPATIENT
Start: 2021-01-01 | End: 2021-01-01

## 2021-01-01 RX ORDER — QUETIAPINE FUMARATE 200 MG/1
25 TABLET, FILM COATED ORAL ONCE
Refills: 0 | Status: COMPLETED | OUTPATIENT
Start: 2021-01-01 | End: 2021-01-01

## 2021-01-01 RX ORDER — MOXIFLOXACIN HYDROCHLORIDE TABLETS, 400 MG 400 MG/1
1 TABLET, FILM COATED ORAL
Qty: 0 | Refills: 0 | DISCHARGE

## 2021-01-01 RX ORDER — CEFUROXIME AXETIL 250 MG
1 TABLET ORAL
Qty: 14 | Refills: 0
Start: 2021-01-01 | End: 2021-01-01

## 2021-01-01 RX ORDER — MEMANTINE HYDROCHLORIDE 10 MG/1
1 TABLET ORAL
Qty: 10 | Refills: 0
Start: 2021-01-01 | End: 2021-01-01

## 2021-01-01 RX ORDER — ERGOCALCIFEROL 1.25 MG/1
1 CAPSULE ORAL
Qty: 4 | Refills: 0
Start: 2021-01-01 | End: 2021-01-01

## 2021-01-01 RX ORDER — HALOPERIDOL DECANOATE 100 MG/ML
5 INJECTION INTRAMUSCULAR ONCE
Refills: 0 | Status: COMPLETED | OUTPATIENT
Start: 2021-01-01 | End: 2021-01-01

## 2021-01-01 RX ORDER — METOPROLOL TARTRATE 50 MG
100 TABLET ORAL
Refills: 0 | Status: DISCONTINUED | OUTPATIENT
Start: 2021-01-01 | End: 2021-01-01

## 2021-01-01 RX ORDER — CEFUROXIME AXETIL 250 MG
500 TABLET ORAL ONCE
Refills: 0 | Status: COMPLETED | OUTPATIENT
Start: 2021-01-01 | End: 2021-01-01

## 2021-01-01 RX ORDER — KETOROLAC TROMETHAMINE 30 MG/ML
30 SYRINGE (ML) INJECTION ONCE
Refills: 0 | Status: DISCONTINUED | OUTPATIENT
Start: 2021-01-01 | End: 2021-01-01

## 2021-01-01 RX ADMIN — Medication 100 MILLIGRAM(S): at 05:33

## 2021-01-01 RX ADMIN — Medication 650 MILLIGRAM(S): at 21:30

## 2021-01-01 RX ADMIN — Medication 4 MILLIGRAM(S): at 22:51

## 2021-01-01 RX ADMIN — SODIUM CHLORIDE 500 MILLILITER(S): 9 INJECTION INTRAMUSCULAR; INTRAVENOUS; SUBCUTANEOUS at 21:40

## 2021-01-01 RX ADMIN — LISINOPRIL 40 MILLIGRAM(S): 2.5 TABLET ORAL at 13:05

## 2021-01-01 RX ADMIN — DIVALPROEX SODIUM 500 MILLIGRAM(S): 500 TABLET, DELAYED RELEASE ORAL at 13:05

## 2021-01-01 RX ADMIN — Medication 5 MILLIGRAM(S): at 22:51

## 2021-01-01 RX ADMIN — Medication 5 MILLIGRAM(S): at 21:41

## 2021-01-01 RX ADMIN — QUETIAPINE FUMARATE 25 MILLIGRAM(S): 200 TABLET, FILM COATED ORAL at 22:52

## 2021-01-01 RX ADMIN — Medication 30 MILLIGRAM(S): at 21:33

## 2021-01-01 RX ADMIN — Medication 500 MILLIGRAM(S): at 17:57

## 2021-01-01 RX ADMIN — Medication 30 MILLIGRAM(S): at 21:53

## 2021-01-01 RX ADMIN — Medication 100 MILLIGRAM(S): at 05:12

## 2021-01-01 RX ADMIN — SODIUM CHLORIDE 1000 MILLILITER(S): 9 INJECTION INTRAMUSCULAR; INTRAVENOUS; SUBCUTANEOUS at 20:02

## 2021-01-01 RX ADMIN — Medication 650 MILLIGRAM(S): at 22:45

## 2021-01-01 RX ADMIN — HALOPERIDOL DECANOATE 5 MILLIGRAM(S): 100 INJECTION INTRAMUSCULAR at 19:20

## 2021-01-01 RX ADMIN — Medication 400 MILLIGRAM(S): at 20:52

## 2021-01-01 RX ADMIN — Medication 100 MILLIGRAM(S): at 17:51

## 2021-01-01 RX ADMIN — DIVALPROEX SODIUM 500 MILLIGRAM(S): 500 TABLET, DELAYED RELEASE ORAL at 13:21

## 2021-01-01 RX ADMIN — Medication 5 MILLIGRAM(S): at 20:56

## 2021-01-01 RX ADMIN — LISINOPRIL 40 MILLIGRAM(S): 2.5 TABLET ORAL at 05:12

## 2021-01-01 RX ADMIN — Medication 100 MILLIGRAM(S): at 07:03

## 2021-01-01 RX ADMIN — QUETIAPINE FUMARATE 25 MILLIGRAM(S): 200 TABLET, FILM COATED ORAL at 22:51

## 2021-01-01 RX ADMIN — Medication 0.5 MILLIGRAM(S): at 22:20

## 2021-01-01 RX ADMIN — LISINOPRIL 40 MILLIGRAM(S): 2.5 TABLET ORAL at 07:03

## 2021-01-01 RX ADMIN — SODIUM CHLORIDE 1000 MILLILITER(S): 9 INJECTION INTRAMUSCULAR; INTRAVENOUS; SUBCUTANEOUS at 20:19

## 2021-01-01 RX ADMIN — DIVALPROEX SODIUM 500 MILLIGRAM(S): 500 TABLET, DELAYED RELEASE ORAL at 12:00

## 2021-01-01 RX ADMIN — DIVALPROEX SODIUM 500 MILLIGRAM(S): 500 TABLET, DELAYED RELEASE ORAL at 13:26

## 2021-01-01 RX ADMIN — SENNA PLUS 2 TABLET(S): 8.6 TABLET ORAL at 21:42

## 2021-01-01 RX ADMIN — AMLODIPINE BESYLATE 10 MILLIGRAM(S): 2.5 TABLET ORAL at 05:12

## 2021-01-01 RX ADMIN — Medication 4 MILLIGRAM(S): at 22:52

## 2021-01-01 RX ADMIN — LISINOPRIL 40 MILLIGRAM(S): 2.5 TABLET ORAL at 06:13

## 2021-01-01 RX ADMIN — SENNA PLUS 2 TABLET(S): 8.6 TABLET ORAL at 22:51

## 2021-01-01 RX ADMIN — Medication 1000 MILLIGRAM(S): at 21:12

## 2021-01-01 RX ADMIN — Medication 100 MILLIGRAM(S): at 18:06

## 2021-01-01 RX ADMIN — QUETIAPINE FUMARATE 25 MILLIGRAM(S): 200 TABLET, FILM COATED ORAL at 21:56

## 2021-01-01 RX ADMIN — QUETIAPINE FUMARATE 25 MILLIGRAM(S): 200 TABLET, FILM COATED ORAL at 23:21

## 2021-01-01 RX ADMIN — Medication 5 MILLIGRAM(S): at 22:52

## 2021-01-01 RX ADMIN — LISINOPRIL 40 MILLIGRAM(S): 2.5 TABLET ORAL at 05:33

## 2021-01-01 RX ADMIN — Medication 5 MILLIGRAM(S): at 23:21

## 2021-01-01 RX ADMIN — Medication 100 MILLIGRAM(S): at 18:12

## 2021-01-01 RX ADMIN — SENNA PLUS 2 TABLET(S): 8.6 TABLET ORAL at 23:21

## 2021-01-01 RX ADMIN — Medication 1000 MILLIGRAM(S): at 21:10

## 2021-01-01 RX ADMIN — AMLODIPINE BESYLATE 10 MILLIGRAM(S): 2.5 TABLET ORAL at 06:13

## 2021-01-01 RX ADMIN — SODIUM CHLORIDE 1000 MILLILITER(S): 9 INJECTION INTRAMUSCULAR; INTRAVENOUS; SUBCUTANEOUS at 23:15

## 2021-01-01 RX ADMIN — AMLODIPINE BESYLATE 10 MILLIGRAM(S): 2.5 TABLET ORAL at 07:02

## 2021-01-01 RX ADMIN — AMLODIPINE BESYLATE 10 MILLIGRAM(S): 2.5 TABLET ORAL at 05:33

## 2021-01-01 RX ADMIN — Medication 4 MILLIGRAM(S): at 21:41

## 2021-01-01 RX ADMIN — QUETIAPINE FUMARATE 25 MILLIGRAM(S): 200 TABLET, FILM COATED ORAL at 21:42

## 2021-01-01 RX ADMIN — DIVALPROEX SODIUM 500 MILLIGRAM(S): 500 TABLET, DELAYED RELEASE ORAL at 17:52

## 2021-01-01 RX ADMIN — DIVALPROEX SODIUM 500 MILLIGRAM(S): 500 TABLET, DELAYED RELEASE ORAL at 12:50

## 2021-02-02 NOTE — PATIENT PROFILE ADULT - NSPROPTRIGHTNOTIFY_GEN_A_NUR
Patient states she is having underarm pain, which she feels may be an ingrown hair.  She would like to be seen.  Please advise of appropriate appointment [phone] (home)   information could not be obtained
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
## 2021-09-29 NOTE — ED PROVIDER NOTE - PHYSICAL EXAMINATION
Const: A&O x3. In no acute distress. Well appearing.  HEENT: NC/AT. Moist mucous membranes.  Eyes: No scleral icterus. EOMI.  Neck:. Soft and supple. Full ROM without pain.  Cardiac: Regular rate and regular rhythm. +S1/S2. Peripheral pulses 2+ and symmetric. No LE edema. Palpable distal and femoral pulses.  Resp: Breath sounds equal bilaterally.  Abd: Soft, non-tender, non-distended. Normal bowel sounds in all 4 quadrants. No guarding or rebound. Pelvis stable.  Back: Spine midline and non-tender. No CVAT. No step off. No deformities.  Skin: No rashes, abrasions or lacerations.  Lymph: No cervical lymphadenopathy.  Neuro: Awake, alert & oriented x 3. Moves all extremities symmetrically. Normal cephalic. Atraumatic.
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
## 2021-09-29 NOTE — H&P ADULT - NSHPLABSRESULTS_GEN_ALL_CORE
Resolution of previous large right posterior hemorrhage with extensive encephalomalacia and gliosis now in the region. Porencephalic distention of the posterior right lateral ventricle. There is a small amount of laterally displaced choroid plexus cysts versus tiny acute hemorrhage. Recommend short-term interval follow-up head CT.
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
## 2021-09-29 NOTE — ED ADULT NURSE NOTE - NSIMPLEMENTINTERV_GEN_ALL_ED
Implemented All Fall with Harm Risk Interventions:  [place] to call system. Call bell, personal items and telephone within reach. Instruct patient to call for assistance. Room bathroom lighting operational. Non-slip footwear when patient is off stretcher. Physically safe environment: no spills, clutter or unnecessary equipment. Stretcher in lowest position, wheels locked, appropriate side rails in place. Provide visual cue, wrist band, yellow gown, etc. Monitor gait and stability. Monitor for mental status changes and reorient to person, place, and time. Review medications for side effects contributing to fall risk. Reinforce activity limits and safety measures with patient and family. Provide visual clues: red socks.
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
## 2021-09-29 NOTE — CONSULT NOTE ADULT - CONSULT REASON
HEAD CT: Resolution of previous large right posterior hemorrhage with extensive encephalomalacia and gliosis now in the region. Porencephalic distention of the posterior right lateral ventricle. There is a small amount of laterally displaced choroid plexus cysts versus tiny acute hemorrhage.
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
## 2021-09-29 NOTE — ED ADULT NURSE REASSESSMENT NOTE - NS ED NURSE REASSESS COMMENT FT1
pt stable, resting in bed comfortably, no acute neuro changes, resp even unlabored, awaiting radiology reports, will ctm
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
## 2021-09-29 NOTE — ED ADULT NURSE NOTE - SUICIDE SCREENING DEPRESSION
Pt a&ox3 hx of Lung CA, c/o difficulty breathing, worsening DONNELLY since Thursday. Pt breathing even and unlabored at rest. Pt denies cp/discomfort, denies headache/dizziness. Abdomen soft, non-tender, non-distended. Skin is cool dry and intact. IVL placed, labs sent. Will continue to monitor. Negative

## 2021-09-30 NOTE — ED ADULT NURSE REASSESSMENT NOTE - NEURO GAIT
1 assist/ambulatory/requires assistance
ambulatory/requires assistance
1 assist/ambulatory/requires assistance

## 2021-09-30 NOTE — ED ADULT NURSE REASSESSMENT NOTE - NS ED NURSE REASSESS COMMENT FT1
Assumed care of patient from previous RN.  Patient resting in stretcher.  Patient offers no complaints at this time.  Safety maintained.
report given to Jenn MALAVE RN
walked into room, pt eating roommates tray, explained she is not suppose to eat.
Awake, oriented to self, pt NPO maintained, upset she can not eat, had explanation given to her in Sinhala, pt waiting for CT   vital signs stable,
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
## 2021-10-01 NOTE — CHART NOTE - NSCHARTNOTEFT_GEN_A_CORE
17:02 SWNote:  [facility] ED handoff per Trauma team, pt ready to return to [facility]( [phone]) . Worker called facility spoke with RN manager Chiquis who inform worker they do not take admissions over the weekend, pt will need 4449C form completed and a covid test.  Trauma PA(Debby)  made aware, will f/u accordingly. Worker will leave form in chart for Trauma provider to complete (Debby made aware) . SW to follow. 17:02 SWNote:  [facility] ED handoff per Trauma team, pt ready to return to [facility]( [phone]) . Worker called facility spoke with RN manager Chiquis who inform worker they do not take admissions over the weekend, pt will need 4449C form completed and a covid test.  Trauma PA(Debby)  made aware, will f/u accordingly.  SW to follow.
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
## 2021-10-04 NOTE — PROGRESS NOTE ADULT - ATTENDING COMMENTS
NSGY Attg:    see above    patient seen and examined    agree with exam and plan as above
NSGY Attg:    see above    patient seen and examined    imaging reviewed -- repeat CT stable without indication of acute ICH    agree with above
CAPO Attg:    see above    patient seen and examined    imaging reviewed    agree with above

## 2021-10-04 NOTE — PATIENT PROFILE ADULT - NSTRANSFERBELONGINGSRESP_GEN_A_NUR
Problem: Patient Care Overview (Adult)  Goal: Plan of Care Review  Outcome: Ongoing (interventions implemented as appropriate)  Pt tolerated chemotherapy infusion without any difficulty. Instructed pt to call for questions or concerns and to return to clinic as directed for next treatment. Pt verbalized understanding. AVS printed and schedule given to pt        yes
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
## 2021-10-05 NOTE — DISCHARGE NOTE NURSING/CASE MANAGEMENT/SOCIAL WORK - PATIENT PORTAL LINK FT
You can access the FollowMyHealth Patient Portal offered by [facility] by registering at the following website: [email or web address]. By joining [employer]’s FollowMyHealth portal, you will also be able to view your health information using other applications (apps) compatible with our system.

## 2021-10-05 NOTE — PHYSICAL THERAPY INITIAL EVALUATION ADULT - DISCHARGE DISPOSITION, PT EVAL
home/return to senior living with 24/7 assist, RW and home PT pending progress and pending confirmation of facility's functional mobility requirements in order to return to facility, as pt is an increased falls risk at this time.

## 2021-10-05 NOTE — PHYSICAL THERAPY INITIAL EVALUATION ADULT - ADDITIONAL COMMENTS
Pt is a unreliable historian. As per EMR + MSW, pt is a resident at a [facility]. Pt stated that she ambulates with RW.

## 2021-10-05 NOTE — DISCHARGE NOTE PROVIDER - NSDCMRMEDTOKEN_GEN_ALL_CORE_FT
amLODIPine 10 mg oral tablet: 1 tab(s) orally once a day  cyanocobalamin 1000 mcg oral tablet: 1 tab(s) orally once a day  Depakote  mg oral tablet, extended release: 1 tab(s) oral - orally three times a day x 5 days  1 tab(s) oral - orally twice a day x 5 days  1 tab(s) oral - orally once a day x 5 days  doxazosin 4 mg oral tablet: 1 tab(s) orally once a day (at bedtime)  ergocalciferol 1.25 mg (50,000 intl units) oral capsule: 1 cap(s) orally once a week MDD:1 tab per week  lisinopril 40 mg oral tablet: 1 tab(s) orally once a day MDD:1 tab per day  melatonin 5 mg oral tablet: 1 tab(s) orally once a day (at bedtime)  memantine 5 mg oral tablet: 1 tab(s) orally once a day  metoprolol tartrate 100 mg oral tablet: 1 tab(s) orally 2 times a day  polyethylene glycol 3350 oral powder for reconstitution: 17 gram(s) orally 2 times a day  QUEtiapine 25 mg oral tablet: 1 tab(s) orally once a day (at bedtime) MDD:1 tab  senna oral tablet: 2 tab(s) orally once a day (at bedtime)
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
## 2021-10-05 NOTE — DISCHARGE NOTE PROVIDER - NSDCCPCAREPLAN_GEN_ALL_CORE_FT
PRINCIPAL DISCHARGE DIAGNOSIS  Diagnosis: ICH (intracerebral hemorrhage)  Assessment and Plan of Treatment:

## 2021-10-05 NOTE — PHYSICAL THERAPY INITIAL EVALUATION ADULT - LEVEL OF INDEPENDENCE: BED TO CHAIR, REHAB EVAL
Current Facility-Administered Medications   Medication Dose Route Frequency Provider Last Rate Last Admin    ipratropium (ATROVENT HFA) 17 MCG/ACT inhaler 2 puff  2 puff Inhalation 4x Daily PRN Tamera Shen MD   2 puff at 01/24/21 1057    sodium chloride flush 0.9 % injection 10 mL  10 mL Intravenous PRN Bonnie Livingston MD   10 mL at 01/23/21 1618    vancomycin 1000 mg IVPB in 250 mL D5W addavial  1,000 mg Intravenous Q12H Bonnie Livingston MD   Stopped at 01/24/21 0747    vancomycin (VANCOCIN) intermittent dosing (placeholder)   Other RX [place] MD        sodium chloride flush 0.9 % injection 10 mL  10 mL Intravenous 2 times per day Bonnie Livingston MD        sodium chloride flush 0.9 % injection 10 mL  10 mL Intravenous PRN Bonnie Livingston MD        enoxaparin (LOVENOX) injection 40 mg  40 mg Subcutaneous Daily Bonnie Livingston MD   40 mg at 01/24/21 0746    acetaminophen (TYLENOL) tablet 650 mg  650 mg Oral Q4H PRN Bonnie Livingston MD        cefepime (MAXIPIME) 2000 mg IVPB minibag  2,000 mg Intravenous Q12H Bonnie Livingston MD   Stopped at 01/24/21 0820    methylPREDNISolone sodium (SOLU-MEDROL) injection 40 mg  40 mg Intravenous Q12H Bonnie Livingston MD   40 mg at 01/24/21 0553    budesonide-formoterol (SYMBICORT) 160-4.5 MCG/ACT inhaler 2 puff  2 puff Inhalation BID Bonnie Livingston MD   2 puff at 01/24/21 0746    dilTIAZem (CARDIZEM CD) extended release capsule 120 mg  120 mg Oral Daily Bonnie Livingston MD   120 mg at 01/24/21 0746    levalbuterol (XOPENEX) nebulizer solution 1.25 mg  1.25 mg Nebulization Q6H PRN Bonnie Livingston MD        sodium chloride nebulizer 0.9 % solution 3 mL  3 mL Nebulization Q8H PRN Bonnie Livingston MD          PULMONARY  CONSULT NOTE      Date of Admission: 1/23/2021  1:04 PM    Reason for Consult: Pl effusion, lung mass    Referring Physician: Dr Levy Guillen  PCP: Flannery Meter     History of Present Illness:     51-year-old female history of COPD, right upper lobe lung mass undergoing outpatient work-up presents for evaluation of shortness of breath. Patient states she has dealt with this on and off since she was diagnosed with Covid 19 in November of last year admitted to the hospital at the beginning of December 2020 with acute on chronic exacerbation of COPD. CT PE at this time revealed a right upper lobe suspicious lung mass. Patient has not undergone any further outpatient work-up since then. Does report she has a history of A. fib but does not take anticoagulation. Does report some mild associated productive cough and left-sided chest pain with the shortness of breath today. No leg swelling. No nausea vomiting abdominal pain. No fever at home. Home treatment with inhalers with minimal relief. Symptoms are moderate and progressive.     Had chest pain- getting better    Problem:  Principal Problem:     PMH:   Past Medical History:   Diagnosis Date    Cancer ([address]     COPD [facility]ch[facility] obstructive pulmonary disease) [facility])        PSH:   Past Surgical History:   Procedure Laterality Date    HYSTERECTOMY      TONSILLECTOMY      pt about 89 years old       Allergies: No Known Allergies    Home Meds:  Medications Prior to Admission: predniSONE (DELTASONE) 10 MG tablet, Take 3 tabs for 3 days, then 2 tabs for 3 days and then 1 tab for 3 days  dilTIAZem (CARDIZEM CD) 120 MG extended release capsule, Take 1 capsule by mouth daily  ipratropium (ATROVENT HFA) 17 MCG/ACT inhaler, Inhale 2 puffs into the lungs 4 times daily  albuterol sulfate HFA (VENTOLIN HFA) 108 (90 Base) MCG/ACT inhaler, Inhale 2 puffs into the lungs every 6 hours as needed for Wheezing  Fluticasone furoate-vilanterol (BREO ELLIPTA) 200-25 MCG/INH AEPB inhaler, Inhale 1 puff into the lungs daily     Social History:   Social History     Socioeconomic History    Marital status:      Spouse name: Not on file    Number of children: Not on file    Years of education: Not on file    Highest education level: Not on file   Occupational History    Not on file   Social Needs    Financial resource strain: Not on file    Food insecurity     Worry: Not on file     Inability: Not on file    Transportation needs     Medical: Not on file     Non-medical: Not on file   Tobacco Use    Smoking status: Former Smoker     Types: Cigarettes     Quit date: 2007     Years since quittin.6    Smokeless tobacco: Never Used   Substance and Sexual Activity    Alcohol use: No    Drug use: No    Sexual activity: Not on file   Lifestyle    Physical activity     Days per week: Not on file     Minutes per session: Not on file    Stress: Not on file   Relationships    Social connections     Talks on phone: Not on file     Gets together: Not on file     Attends [employer] service: Not on file     Active member of club or organization: Not on file     Attends meetings of clubs or organizations: Not on file     Relationship status: Not on file    Intimate partner violence     Fear of current or ex partner: Not on file     Emotionally abused: Not on file     Physically abused: Not on file     Forced sexual activity: Not on file   Other Topics Concern    Not on file   Social History Narrative    Not on file       Family History: History reviewed. No pertinent family history.     Review of Systems  Fever/ chills - no  Chest pain - ++, betetr  Cough - +  Expectoration / hemoptysis - no  shortness of breath - ++  Headache - no  Sinus drainage/ sore throat - no  abdominal pain - no  Swelling feet - no  Nausea/ vomiting/ diarrhea/ constipation - no    Physical Exam  Vital Signs: /72   Pulse 94   Temp 97.7 °F (36.5 °C) (Oral)   Resp 16   Ht 5' 6\" (1.676 m)   Wt 146 lb 2.6 oz (66.3 kg)   SpO2 99%   BMI 23.59 kg/m²       Admission Weight: Weight: 140 lb (63.5 kg)    General Appearance: Healthy, alert, active, cooperative, and in no distress  Head: Normocephalic, without obvious abnormality, atraumatic  Neck: no adenopathy, no JVD, supple, symmetrical, trachea midline\"thyroid not enlarged, symmetric, no tenderness/mass/nodule  Lungs: fair air entry bilaterally; breath sounds- vesicular; rhonchi- absent; rales/ crackles- absent  Heart: : regular rate and rhythm, S1, S2 normal, no murmur, click, rub or gallop  Abdomen: soft, non-tender; bowel sounds normal; no masses,  no organomegaly  Extremities: extremities normal, atraumatic, no cyanosis or edema  Skin: skin color, texture, turgor normal. No rashes or lesions  Neurologic: Grossly normal    [unfilled]    Recent labs, Imaging studies reviewed      Data ReviewCBC:   Recent Labs     01/23/21  1319 01/24/21  0607   WBC 10.1 14.7*   RBC 4.92 4.40   HGB 14.8 13.3   HCT 45.0 40.2    259     BMP:   Recent Labs     01/23/21  1319 01/24/21  0607   GLUCOSE 100* 130*    136   K 3.9 4.6   BUN 12 16   CREATININE 0.70 0.78   CALCIUM 9.6 9.8     ABGs: No results for input(s): PHART, PO2ART, SQU0UUV, WOG3IMH, BEART, Z7TTFQLH, WWV8UGK in the last 72 hours. PT/INR:  No results found for: PTINR      ASSESSMENT / PLAN:    Pneumonia/ Pl effusion - ? Pneumonia/ parapneumonia- continue ABx; CXR 1/25- may need thoracentesis  RUL mass - appears smaller than 12/2020 - will d/w radiology  PET scan as OP  Subcarinal adenopathy- ?  Reactive  COPD - BD    Electronically signed by Jyoti Rivas on 01/24/21 at 1:50 PM. n/a
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
## 2021-10-05 NOTE — DISCHARGE NOTE PROVIDER - CARE PROVIDER_API CALL
Dimitri Comer)  Neurosurgery  [address] [place] [address]  Phone: [phone]  Fax: [phone]  Follow Up Time: 2 weeks
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
## 2021-10-08 NOTE — ED PROVIDER NOTE - PROGRESS NOTE DETAILS
Reevaluated patient at bedside.  Patient feeling well, no pain, no htn. RN called facility, they will accept pt back, abx rx sent to preferred pharmacy

## 2021-10-08 NOTE — ED ADULT NURSE NOTE - OBJECTIVE STATEMENT
87 y/o female received aox2 via stretcher for headache eval. pt currently denies headache during initial assessment. states that she had been feeling intermittent pains "inside head" x2 days. denies n/v/d/sob/cp.  IVL started, bloods drawn and sent to lab.

## 2021-10-08 NOTE — ED PROVIDER NOTE - UNABLE TO OBTAIN
Unable to obtain complete HPI due to pt having dementia. Unable to obtain complete ROS due to pt having dementia. Dementia

## 2021-10-08 NOTE — ED ADULT TRIAGE NOTE - CHIEF COMPLAINT QUOTE
Sent from [facility] for eval of c/o headache and per transfer papers near syncope. Patient with dementia and denies any pain and is per her baseline. Patient with prior intracranial bleed.

## 2021-10-08 NOTE — ED PROVIDER NOTE - CARE PROVIDER_API CALL
[place] [address]  Phone: [phone]  Fax: [phone]  Follow Up Time: 1-3 Days

## 2021-10-08 NOTE — ED PROVIDER NOTE - CLINICAL SUMMARY MEDICAL DECISION MAKING FREE TEXT BOX
pt with prior h/o ICH recent admission for ? small recurrent bleed. Now presenting with HTN and reported HA. Will obtain screening labs, CT head and will monitor.

## 2021-10-08 NOTE — ED ADULT NURSE REASSESSMENT NOTE - NS ED NURSE REASSESS COMMENT FT1
pt voided twice on bedpan with assistance, currently denies headache but reports slight dizziness. provided with sandwhich, tea and a fruit cup. pt able to eat independently. awaiting for ambulance  to return to [facility].

## 2021-10-08 NOTE — ED PROVIDER NOTE - CARE PLAN
Elevated Blood Pressure: Care Instructions  Your Care Instructions    Blood pressure is a measure of how hard the blood pushes against the walls of your arteries. It's normal for blood pressure to go up and down throughout the day. But if it stays up over time, you have high blood pressure. Two numbers tell you your blood pressure. The first number is the systolic pressure. It shows how hard the blood pushes when your heart is pumping. The second number is the diastolic pressure. It shows how hard the blood pushes between heartbeats, when your heart is relaxed and filling with blood. An ideal blood pressure in adults is less than 120/80 (say \"120 over 80\"). High blood pressure is 140/90 or higher. You have high blood pressure if your top number is 140 or higher or your bottom number is 90 or higher, or both. The main test for high blood pressure is simple, fast, and painless. To diagnose high blood pressure, your doctor will test your blood pressure at different times. After testing your blood pressure, your doctor may ask you to test it again when you are home. If you are diagnosed with high blood pressure, you can work with your doctor to make a long-term plan to manage it. Follow-up care is a key part of your treatment and safety. Be sure to make and go to all appointments, and call your doctor if you are having problems. It's also a good idea to know your test results and keep a list of the medicines you take. How can you care for yourself at home? · Do not smoke. Smoking increases your risk for heart attack and stroke. If you need help quitting, talk to your doctor about stop-smoking programs and medicines. These can increase your chances of quitting for good. · Stay at a healthy weight. · Try to limit how much sodium you eat to less than 2,300 milligrams (mg) a day. Your doctor may ask you to try to eat less than 1,500 mg a day. · Be physically active.  Get at least 30 minutes of exercise on most days of the week. Walking is a good choice. You also may want to do other activities, such as running, swimming, cycling, or playing tennis or team sports. · Avoid or limit alcohol. Talk to your doctor about whether you can drink any alcohol. · Eat plenty of fruits, vegetables, and low-fat dairy products. Eat less saturated and total fats. · Learn how to check your blood pressure at home. When should you call for help? Call your doctor now or seek immediate medical care if:  ? · Your blood pressure is much higher than normal (such as 180/110 or higher). ? · You think high blood pressure is causing symptoms such as:  ¨ Severe headache. ¨ Blurry vision. ? Watch closely for changes in your health, and be sure to contact your doctor if:  ? · You do not get better as expected. Where can you learn more? Go to http://marly-candis.info/. Enter I742 in the search box to learn more about \"Elevated Blood Pressure: Care Instructions. \"  Current as of: September 21, 2016  Content Version: 11.4  © 7499-4264 [employer]. Care instructions adapted under license by [employer] (which disclaims liability or warranty for this information). If you have questions about a medical condition or this instruction, always ask your healthcare professional. [address] any warranty or liability for your use of this information. Start 81 mg aspirin daily. Your wife could see dr Silas Calvo here. 1 Principal Discharge DX:	Headache  Secondary Diagnosis:	Acute UTI

## 2021-10-08 NOTE — ED PROVIDER NOTE - PATIENT PORTAL LINK FT
You can access the FollowMyHealth Patient Portal offered by [facility] by registering at the following website: [email or web address]. By joining [employer]’s FollowMyHealth portal, you will also be able to view your health information using other applications (apps) compatible with our system.

## 2021-10-08 NOTE — ED ADULT TRIAGE NOTE - LANGUAGE ASSISTANCE NEEDED
patient with demnetia and is poor historian unable to speak and answer  appropriately/No-Patient/Caregiver offered and refused free interpretation services.

## 2021-10-21 PROBLEM — Z00.00 ENCOUNTER FOR PREVENTIVE HEALTH EXAMINATION: Status: ACTIVE | Noted: 2021-01-01

## 2021-10-29 PROBLEM — Z86.79 HISTORY OF HYPERTENSION: Status: RESOLVED | Noted: 2021-01-01 | Resolved: 2021-01-01

## 2021-10-29 PROBLEM — Z78.9 CURRENT NON-SMOKER: Status: ACTIVE | Noted: 2021-01-01

## 2021-10-29 PROBLEM — I62.9 INTRACRANIAL HEMORRHAGE: Status: ACTIVE | Noted: 2021-01-01

## 2021-10-29 NOTE — PHYSICAL EXAM
[General Appearance - Alert] : alert [General Appearance - In No Acute Distress] : in no acute distress [Oriented To Time, Place, And Person] : oriented to person, place, and time [Impaired Insight] : insight and judgment were intact [Affect] : the affect was normal [Person] : oriented to person [Place] : oriented to place [Time] : oriented to time [Short Term Intact] : short term memory intact [Fluency] : fluency intact [Comprehension] : comprehension intact [Cranial Nerves Optic (II)] : visual acuity intact bilaterally,  pupils equal round and reactive to light [Cranial Nerves Oculomotor (III)] : extraocular motion intact [Cranial Nerves Trigeminal (V)] : facial sensation intact symmetrically [Cranial Nerves Facial (VII)] : face symmetrical [Cranial Nerves Glossopharyngeal (IX)] : tongue and palate midline [Cranial Nerves Accessory (XI - Cranial And Spinal)] : head turning and shoulder shrug symmetric [Cranial Nerves Hypoglossal (XII)] : there was no tongue deviation with protrusion [Motor Tone] : muscle tone was normal in all four extremities [Motor Strength] : muscle strength was normal in all four extremities [Sensation Tactile Decrease] : light touch was intact [Sensation Pain / Temperature Decrease] : pain and temperature was intact [Antalgic] : antalgic [No Visual Abnormalities] : no visible abnormailities [FreeTextEntry8] : ambulates with assistance of a walker.
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
## 2021-10-29 NOTE — ASSESSMENT
[FreeTextEntry1] : Ms. Caruso is doing well from the neurosurgical perspective.  She is neurologically intact.  CT head from 10/8/2021 did not reveal any ICH.  She does not offer any complaints and does not need to resume any aspirin therapy.   No additional imaging warranted at this time.  She may follow up prn.\par Patient has been given an opportunity to ask and have their questions answered to their satisfaction.\par Patient knows to call the office if there are any new or worsening symptoms.\par

## 2021-10-29 NOTE — REVIEW OF SYSTEMS
[As Noted in HPI] : as noted in HPI [Negative] : Heme/Lymph [Feeling Tired] : not feeling tired [Numbness] : no numbness [Tingling] : no tingling [Difficulty Walking] : no difficulty walking

## 2021-11-24 NOTE — ED PROVIDER NOTE - CLINICAL SUMMARY MEDICAL DECISION MAKING FREE TEXT BOX
Pt s/p fall this morning w/ poorly localized c/o of pain to abd and possibly chest. Will get CT r/o internal injuries if no acute findings can return to assisted living for further observation.

## 2021-11-24 NOTE — ED PROVIDER NOTE - MUSCULOSKELETAL, MLM
Spine appears normal, range of motion is not limited, no muscle or joint tenderness. Extremities full ROM all joints Spine non-tender, range of motion is not limited, no muscle or joint tenderness. Extremities full ROM all joints
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
## 2021-11-24 NOTE — ED PROVIDER NOTE - CARDIAC, MLM
Normal rate, regular rhythm.  Heart sounds S1, S2.  No murmurs, rubs or gallops. ?pain on lateral compression of chest wall no crepitus no ecchymosis

## 2021-11-24 NOTE — ED PROVIDER NOTE - ENMT, MLM
Airway patent, Nasal mucosa clear. Mouth with normal mucosa. Throat has no vesicles, no oropharyngeal exudates and uvula is midline. no evidence of head injury neck supple nontender

## 2021-11-24 NOTE — ED ADULT NURSE NOTE - BREATH SOUNDS, MLM
Tear of lateral meniscus of left knee, current, unspecified tear type, initial encounter  01/11/2019    Active  PartJose syed
Clear

## 2021-11-24 NOTE — ED PROVIDER NOTE - NSFOLLOWUPINSTRUCTIONS_ED_ALL_ED_FT
Blunt Abdominal Injury    WHAT YOU NEED TO KNOW:    A blunt abdominal injury is a direct blow to the abdomen without an open wound. Organs such as your pancreas, liver, spleen, or bladder may be injured. Your intestines may also be injured. These injuries may cause internal bleeding.     DISCHARGE INSTRUCTIONS:    Call 911 for any of the following:   •You feel weak, lightheaded, or you faint.       •You have a fast heartbeat, fast breathing, and pale, sweaty skin.      •You have new or severe pain, swelling, or firmness in your abdomen.      Seek care immediately if:   •You have nausea and are vomiting.       •You have blood in your urine or bowel movement.       •You have new or severe pain in your back.       •You have trouble urinating or having a bowel movement.       Contact your healthcare provider if:   •You have questions or concerns about your condition or care.          Medicines:   •NSAIDs help decrease swelling and pain or fever. This medicine is available with or without a doctor's order. NSAIDs can cause stomach bleeding or kidney problems in certain people. If you take blood thinner medicine, always ask your healthcare provider if NSAIDs are safe for you. Always read the medicine label and follow directions.      •Take your medicine as directed. Contact your healthcare provider if you think your medicine is not helping or if you have side effects. Tell him or her if you are allergic to any medicine. Keep a list of the medicines, vitamins, and herbs you take. Include the amounts, and when and why you take them. Bring the list or the pill bottles to follow-up visits. Carry your medicine list with you in case of an emergency.      Apply ice: Ice helps to decrease swelling and pain. Ice may also help prevent tissue damage. Use an ice pack, or put crushed ice in a plastic bag. Cover it with a towel and place it on your injured area for 15 to 20 minutes every hour or as directed.    Limit activity as directed:Decrease pain, swelling, and prevent other injuries by limiting activity. Do not play sports or exercise until your healthcare provider says it is okay.     Follow up with your healthcare provider as directed: Write down your questions so you remember to ask them during your visits.

## 2021-11-24 NOTE — ED ADULT TRIAGE NOTE - CHIEF COMPLAINT QUOTE
'back pain', [facility] , rolled out of bed this am, witnessed by room mate, c/o l side pain

## 2021-11-24 NOTE — ED PROVIDER NOTE - PATIENT PORTAL LINK FT
You can access the FollowMyHealth Patient Portal offered by [facility] by registering at the following website: [email or web address]. By joining [employer]’s FollowMyHealth portal, you will also be able to view your health information using other applications (apps) compatible with our system.

## 2021-11-24 NOTE — ED ADULT NURSE NOTE - CHIEF COMPLAINT QUOTE
[facility] , rolled out of bed this am, witnessed by room mate, c/o l side pain

## 2021-11-24 NOTE — ED PROVIDER NOTE - NS_ ATTENDINGSCRIBEDETAILS _ED_A_ED_FT
Uri Cruz MD - The scribe's documentation has been prepared under my direction and personally reviewed by me in its entirety. I confirm that the note above accurately reflects all work, treatment, procedures, and medical decision making performed by me.

## 2021-11-25 NOTE — ED PROVIDER NOTE - ATTENDING CONTRIBUTION TO CARE
Uri Cruz MD: I have personally performed a face to face diagnostic evaluation on this patient.  I have reviewed the PA note and agree with the history, exam, and plan of care, except as noted.  History and Exam by me shows same findings as documented    Att note: Patient sent from assisted living with c/o "abdominal pain". Patient has dementia. Was seen here yesterday after a fall, and had CT of chest and abdomen as part of the evaluation. Today found to have fever. Patient in no distress, lungs clear. abdomen soft with some lower tenderness on deep palpation. No r/g. Will recheck labs, get urine and reassess when results available

## 2021-11-25 NOTE — ED ADULT NURSE NOTE - NSICDXPASTSURGICALHX_GEN_ALL_CORE_FT
PAST SURGICAL HISTORY:  H/O mastectomy left    History of colon resection     History of hip surgery right

## 2021-11-25 NOTE — ED PROVIDER NOTE - NSFOLLOWUPINSTRUCTIONS_ED_ALL_ED_FT
Acute Abdominal Pain    WHAT YOU NEED TO KNOW:    What do I need to know about acute abdominal pain? Acute abdominal pain usually starts suddenly and gets worse quickly.    Abdominal Organs         What causes acute abdominal pain?   •An allergic reaction to food, food poisoning, or acid reflux      •Stress      •Constipation or a blockage in your bowels      •Monthly period pain in females      •Inflammation or rupture of your appendix      •Swelling or an infection in your abdomen or an organ      •An ulcer or a tear in your esophagus, stomach, or intestines      •Bleeding in your abdomen or an organ      •Stones in your kidney or gallbladder      •In women, diseases of the fallopian tubes or ovaries, or an ectopic pregnancy      How is the cause of acute abdominal pain diagnosed? Your healthcare provider will examine you and ask about your symptoms. Tell the provider when your symptoms started and about any recent travel or surgery. Also tell him or her what makes the pain better or worse. Based on what your provider finds from the exam, and your symptoms, you may need any of the following:  •Blood tests may be done to check inflammation, liver function, blood cell levels, or get information about your overall health.      •A sample of your bowel movement may be tested to see if you are absorbing nutrients from your diet. It can also be tested for germs that may be causing your illness.      •X-ray, ultrasound, CT, or MRI pictures may be used to check the organs inside your abdomen. You may be given contrast liquid to help the organs show up better in the pictures. Tell the healthcare provider if you have ever had an allergic reaction to contrast liquid. Do not enter the MRI room with anything metal. Metal can cause serious injury. Tell the healthcare provider if you have any metal in or on your body.      •An endoscopy is a test to look inside your esophagus, stomach, and small intestine. During an endoscopy, healthcare providers may find problems in your esophagus, stomach, or small intestine. Some problems may be fixed with small tools. Samples may be taken from your esophagus, stomach, or small intestine, and sent to a lab for tests.  Upper Endoscopy           •A colonoscopy is a test that is done to look inside your colon. During a colonoscopy, healthcare providers may find problems in your colon. Some problems may be fixed with small tools. Samples may be taken from your colon and sent to the lab for tests.             How is acute abdominal pain treated? Treatment depends on the cause of your abdominal pain. You may need any of the following:  •Medicines may be given to decrease pain, treat an infection, and manage your symptoms, such as constipation.      •Surgery may be needed to treat a serious cause of abdominal pain. Examples include surgery to treat appendicitis or a blockage in your bowels.      What can I do to manage my symptoms?   •Apply heat on your abdomen for 20 to 30 minutes every 2 hours for as many days as directed. Heat helps decrease pain and muscle spasms.      •Make changes to the food you eat, if needed. Do not eat foods that cause abdominal pain or other symptoms. Eat small meals more often. The following changes may also help:?Eat more high-fiber foods if you are constipated. High-fiber foods include fruits, vegetables, whole-grain foods, and legumes.             ?Do not eat foods that cause gas if you have bloating. Examples include broccoli, cabbage, and cauliflower. Do not drink soda or carbonated drinks. These may also cause gas.      ?Do not eat foods or drinks that contain sorbitol or fructose if you have diarrhea and bloating. Some examples are fruit juices, candy, jelly, and sugar-free gum.      ?Do not eat high-fat foods. Examples include fried foods, cheeseburgers, hot dogs, and desserts.      ?Limit or do not have caffeine. Caffeine may make symptoms, such as heart burn or nausea, worse.      ?Drink more liquids to prevent dehydration from diarrhea or vomiting. Ask your healthcare provider how much liquid to drink each day and which liquids are best for you.      •Manage your stress. Stress may cause abdominal pain. Your healthcare provider may recommend relaxation techniques and deep breathing exercises to help decrease your stress. Your provider may recommend you talk to someone about your stress or anxiety, such as a counselor or a trusted friend. Get plenty of sleep and exercise regularly.   FAMILY WALKING FOR EXERCISE           •Limit or do not drink alcohol. Alcohol can make your abdominal pain worse. Ask your healthcare provider if it is okay for you to drink alcohol. Also ask how much is safe for you to drink. A drink of alcohol is 12 ounces of beer, ½ ounce of liquor, or 5 ounces of wine.      •Do not smoke. Nicotine and other chemicals in cigarettes can damage your esophagus and stomach. Ask your healthcare provider for information if you currently smoke and need help to quit. E-cigarettes or smokeless tobacco still contain nicotine. Talk to your healthcare provider before you use these products.      When should I seek immediate care?   •You vomit blood or cannot stop vomiting.      •You have blood in your bowel movement, or it looks like tar.      •You have bleeding from your rectum.      •Your abdomen is larger than usual, more painful, and hard.      •You have severe pain in your abdomen.      •You stop passing gas and having bowel movements.      •You feel weak, dizzy, or faint.      When should I call my doctor?   •You have a fever.      •You have new or worsening signs or symptoms.      •Your symptoms do not get better with treatment.      •You have questions or concerns about your condition or care.      CARE AGREEMENT:    You have the right to help plan your care. Learn about your health condition and how it may be treated. Discuss treatment options with your healthcare providers to decide what care you want to receive. You always have the right to refuse treatment.

## 2021-11-25 NOTE — ED PROVIDER NOTE - CLINICAL SUMMARY MEDICAL DECISION MAKING FREE TEXT BOX
89 y/o F with hx of breast cancer, HTN, ICH BIBA from [facility] for evaluation of abdominal pain x 4 days. Pt has dementia and is a poor historian. Pt was seen in ED yesterday s/p fall and was c/o abdominal pain, had ct chest and abdomen/pelvis without any acute findings. Pt noted to have fever in ED today. PE: as above A/P; will do sepsis workup, had ct abdomen/pelvis/chest yesterday, will get ekg, cxr, cultures, labs, RVP, IVF, tylenol, reassess

## 2021-11-25 NOTE — ED ADULT NURSE REASSESSMENT NOTE - NS ED NURSE REASSESS COMMENT FT1
received report and assumed care of pt at change of shift. pt sleeping at this time. was awake, confused earlier. NAD. awaiting results and disposition at this time. will continue to monitor

## 2021-11-25 NOTE — ED ADULT NURSE NOTE - OBJECTIVE STATEMENT
Patient brought to ED via EMS from her assisted living for c/o abdominal pains. Per EMS they were told the patient has had the pain for 4 days. Patient is poor historian and alternates Armenian speaking and English with signs of dementia, alert to person only. Patient restless and pulling at equipment. Patient noted to be rubbing right lower abdomen and has tenderness to palpation. Patient denies nausea. Patient was seen in  the ED yesterday s/p fall and was treated and discharged back to her assisted living. Patient noted to have fever 100.4. Patient brought to ED via EMS from her assisted living for c/o abdominal pains. Per EMS they were told the patient has had the pain for 4 days. Patient is poor historian and alternates Barbadian speaking and English with signs of dementia, alert to person only. Patient restless and pulling at equipment. Patient noted to be rubbing right lower abdomen and has tenderness to palpation. Patient denies nausea. Patient was seen in  the ED yesterday s/p fall and was treated and discharged back to her assisted living. Patient noted to have fever 100.4 and c/o chills. Patient noted to have small scabbed abrasion to left lateral lower leg and to have ecchymosis to the right thumb.

## 2021-11-25 NOTE — ED PROVIDER NOTE - OBJECTIVE STATEMENT
87 y/o F with hx of breast cancer, HTN, ICH BIBA from [facility] for evaluation of abdominal pain x 4 days. Pt has dementia and is a poor historian. Pt was seen in ED yesterday s/p fall and was c/o abdominal pain, had ct chest and abdomen/pelvis without any acute findings. Pt noted to have fever in ED today.

## 2021-11-25 NOTE — ED PROVIDER NOTE - PATIENT PORTAL LINK FT
You can access the FollowMyHealth Patient Portal offered by [facility] by registering at the following website: [email or web address]. By joining [employer]’s FollowMyHealth portal, you will also be able to view your health information using other applications (apps) compatible with our system.

## 2021-11-25 NOTE — ED ADULT TRIAGE NOTE - CHIEF COMPLAINT QUOTE
PT BIB EMS from [facility] c/o abdominal pain; denies n/v/d as per facility PT BIB EMS from [facility] c/o abdominal pain x4 days; denies n/v/d as per facility

## 2021-11-25 NOTE — ED PROVIDER NOTE - ABDOMINAL TENDER
+ttp diffuse lower abdomen , no rebound or guarding, old well healed surgical scar noted to abdomen/left lower quadrant/right lower quadrant

## 2021-11-26 NOTE — ED ADULT NURSE REASSESSMENT NOTE - NS ED NURSE REASSESS COMMENT FT1
pt picked up by [facility] for transport back to [facility] at this time

## 2021-12-01 NOTE — ED PROVIDER NOTE - PATIENT PORTAL LINK FT
You can access the FollowMyHealth Patient Portal offered by [facility] by registering at the following website: [email or web address]. By joining [facility] [employer]’s FollowMyHealth portal, you will also be able to view your health information using other applications (apps) compatible with our system.

## 2021-12-01 NOTE — ED ADULT NURSE NOTE - NSIMPLEMENTINTERV_GEN_ALL_ED
Implemented All Universal Safety Interventions:  [place] to call system. Call bell, personal items and telephone within reach. Instruct patient to call for assistance. Room bathroom lighting operational. Non-slip footwear when patient is off stretcher. Physically safe environment: no spills, clutter or unnecessary equipment. Stretcher in lowest position, wheels locked, appropriate side rails in place.

## 2021-12-01 NOTE — ED PROVIDER NOTE - CARE PROVIDER_API CALL
Emil Abbott ()  Internal Medicine  [address] [place] [address]  Phone: [phone]  Fax: [phone]  Follow Up Time: 1-3 Days

## 2021-12-01 NOTE — ED PROVIDER NOTE - NSFOLLOWUPINSTRUCTIONS_ED_ALL_ED_FT
Follow up with Gastroenterology. Return for worsening pain, fever, vomiting, burning with urination, bloody urine, numbness/weakness, incontinence, etc,       Dolor abdominal davin    LO QUE NECESITA SABER:    Generalmente, el dolor abdominal davin comienza repentinamente y empeora rápidamente.    Órganos abdominales         INSTRUCCIONES SOBRE EL ERICH HOSPITALARIA:    Regrese a la fabrizio de emergencias si:  •Usted no puede dejar de vomitar o vomita jazmin.      •Usted tiene jazmin en las evacuaciones intestinales o estas tienen aspecto alquitranado.      •Usted tiene sangrado por canada recto.      •El tamaño del abdomen es más ranjith de lo normal y se siente libia y más doloroso.      •Tiene dolor abdominal intenso.      •Usted oscar de tener flatulencias y evacuaciones intestinales.      •Usted se siente mareado, débil o tiene sensación de desmayo.      Llame a canada médico si:  •Tiene fiebre.      •Tiene nuevos signos y síntomas, o estos empeoran.      •Zelda síntomas no mejoran con el tratamiento.      •Usted tiene preguntas o inquietudes acerca de canada condición o cuidado.      Medicamentos:  •Los medicamentosestos pueden administrarse para disminuir el dolor, tratar alycia infección y manejar zelda síntomas.      •[place] zelda medicamentos benson se le haya indicado.Consulte con canada médico si usted sunny que canada medicamento no le está ayudando o si presenta efectos secundarios. Infórmele si es alérgico a cualquier medicamento. Mantenga alycia lista actualizada de los medicamentos, las vitaminas y los productos herbales que mary. Incluya los siguientes datos de los medicamentos: cantidad, frecuencia y motivo de administración. Traiga con usted la lista o los envases de las píldoras a zelda citas de seguimiento. Lleve la lista de los medicamentos con usted en key de alycia emergencia.      El manejo de zelda síntomas:  •Aplique calorsobre el abdomen de 20 a 30 minutos cada 2 horas por los días que le indiquen. El calor ayuda a disminuir el dolor y los espasmos musculares.      •Realice cambios en los alimentos que consume, de ser necesario.No coma alimentos que causan dolor abdominal u otros síntomas. Ingiera comidas pequeñas, más a menudo. Los siguientes cambios también pueden ayudar:?Coma más alimentos ricos en fibra si tiene estreñimiento.Los alimentos altos en fibra incluyen frutas, verduras, alimentos de grano integral y legumbres.             ?No coma alimentos que causan gas si tiene distensión.Por ejemplo, brócoli, col y coliflor. No tome gaseosas ni bebidas carbonatadas. Estas también pueden provocar gases.      ?No consuma alimentos o bebidas que contienen sorbitol o fructosa si tiene diarrea y distensión.Algunos ejemplos son jugos de frutas, dulces, mermeladas y gomas de mascar sin azúcar.      ?No consuma alimentos altos en grasa.Por ejemplo, comidas fritas, hamburguesas con queso, perros calientes y postres.      ?Limite o no tome cafeína.La cafeína puede empeorar los síntomas, benson la acidez o las náuseas.      ?Venita suficientes líquidos para evitar la deshidratación causada por la diarrea o los vómitos.Pregunte a canada médico sobre la cantidad de líquido que necesita naomi todos los saleem y cuáles le recomienda.      •Controle canada estrés.El estrés puede causar dolor abdominal. [place] médico puede recomendarle técnicas de relajación y ejercicios de respiración profunda para ayudar a disminuir el estrés. [place] médico puede recomendarle que hable con alguien sobre canada estrés o ansiedad, benson un consejero o un amigo de confianza. Duerma lo suficiente y realice ejercicio regularmente.  Pankaj afrodescendiente caminando benson ejercicio           •Limite o no consuma bebidas alcohólicas.El alcohol puede empeorar el dolor abdominal. Pregúntele a canada médico si está cande que usted consuma alcohol. Pregunte cuanto es la cantidad syed para usted naomi. Alycia bebida de alcohol equivale a 12 onzas de cerveza, ½ onza de licor o 5 onzas de vino.      •No fume.La nicotina y otros químicos en los cigarrillos pueden dañarle el esófago y el estómago. Pida información a canada médico si usted actualmente fuma y necesita ayuda para dejar de fumar. Los cigarrillos electrónicos o el tabaco sin humo igualmente contienen nicotina. Consulte con canada médico antes de utilizar estos productos.      Acuda a la consulta de control con canada médico según las indicaciones:Anote zelda preguntas para que se acuerde de hacerlas myah zelda visitas.

## 2021-12-01 NOTE — ED PROVIDER NOTE - PHYSICAL EXAMINATION
Constitutional: Awake, Alert, non-toxic. NAD. Well appearing, well nourished.   HEAD: Normocephalic, atraumatic.   EYES: EOM intact, conjunctiva and sclera are clear bilaterally.   ENT: No rhinorrhea, patent, mucous membranes pink/moist, no drooling or stridor.   NECK: Supple, non-tender  CARDIOVASCULAR: Normal S1, S2; regular rate and rhythm.  RESPIRATORY: Normal respiratory effort; breath sounds CTAB, no wheezes, rhonchi, or rales. Speaking in full sentences. No accessory muscle use.   ABDOMEN: Soft; (+) large midline abdominal scar, (+) mild lower abdominal TTP. no CVAT   EXTREMITIES: Full passive and active ROM in all extremities; non-tender to palpation; distal pulses palpable and symmetric  SKIN: Warm, dry; good skin turgor, no apparent lesions or rashes, no ecchymosis, brisk capillary refill.  NEURO: A&O x3. Sensory and motor functions are grossly intact. Speech is normal. Appearance and judgement seem appropriate for gender and age.

## 2021-12-01 NOTE — ED PROVIDER NOTE - OBJECTIVE STATEMENT
87 y/o female with PMHx HTN and breast Ca presents today due to abdominal pain. pt describes pain as aching, non-radiating, and 2/10. pt reports the pain has slightly improved from earlier. PSHX- colon resection. pt denies N/V/D, fall/trauma, fever, dysuria, hematuria, or any other complaints.

## 2021-12-01 NOTE — ED PROVIDER NOTE - CLINICAL SUMMARY MEDICAL DECISION MAKING FREE TEXT BOX
presents today due to abdominal pain. pt reports the pain has slightly improved from earlier. PSHX- colon resection. plan includes labs, UA r/o UTI, CT abd/pelvis, re-assess

## 2021-12-01 NOTE — ED PROVIDER NOTE - PROGRESS NOTE DETAILS
Robin advised recent urine culture negative and may dc UA. pt with improvement of pain after Tylenol. CT negative for acute findings. All questions were answered. Discussed the importance of prompt, close medical follow-up. Patient will return with any changes, concerns or persistent/worsening symptoms.  Patient verbalized understanding.

## 2021-12-01 NOTE — ED PROVIDER NOTE - ATTENDING CONTRIBUTION TO CARE
88 y.o. F c/o right sided back pain, pt does have dementia and is a poor historian, mentions having intermittent abd pain as well, on chart review appears pt has been here multiple times with similar complaints, no n/v, no fever, no diarrhea, no pain in extremities; on exam pt is wd, wn, appears in pain; chest - cta, no w/r/r; cv - rrr, no m/r/g; abd - soft, +lower abd ttp, no guarding/rebound; skin - c/d/i, ex lap scar abd; msk - no bony ttp, FROM throughout; A/P - history limited by dementia, possibly this is the same pain pt has presented for previously, will recheck labs/ct today, provide pain medication, reassess

## 2021-12-01 NOTE — ED ADULT NURSE NOTE - OBJECTIVE STATEMENT
89 yo female presents to the ED with presents today due to abdominal pain. pt describes pain as aching, non-radiating, and 2/10. pt reports the pain has slightly improved from earlier. PSHX- colon resection. pt denies N/V/D, fall/trauma, fever, dysuria, hematuria, or any other complaints.

## 2021-12-01 NOTE — ED ADULT NURSE REASSESSMENT NOTE - NS ED NURSE REASSESS COMMENT FT1
received report at change of shift. pt picked up at this time for transport back to assisted living. vs as charted

## 2021-12-01 NOTE — ED ADULT NURSE NOTE - CHIEF COMPLAINT QUOTE
advancing treatment catheter into place under US guidance. abdominal pain since 3pm today. hx constipation.

## 2021-12-24 NOTE — ED PROVIDER NOTE - OBJECTIVE STATEMENT
xlator #[id number]  pt bib ems for head injury s/p witnessed slip and fall in bathroom at 0300. pt c/o bruising above left eye with mild pain. no loc, d/n/v, weakness, numbness, cp, sob, abd pain, arm or leg pain neck or back pain. pt declining pain meds  pmd - krysten

## 2021-12-24 NOTE — ED ADULT TRIAGE NOTE - CHIEF COMPLAINT QUOTE
according to EMS- pt had witnessed fall @ 3am- No LOC, vomiting. Swelling & ecchymosis to lt periorbital area

## 2021-12-24 NOTE — ED PROVIDER NOTE - CARE PROVIDER_API CALL
Angel Wang)  [place] [address]  Phone: [phone]  Fax: [phone]  Follow Up Time: 1-3 Days

## 2021-12-24 NOTE — ED ADULT NURSE NOTE - OBJECTIVE STATEMENT
89 y/o female received aox3 via stretcher bibems from [facility] for fall eval. pt reports that she fell around 3am while trying to go to the restroom. bruising and swelling noted to left eyebrow, denies LOC. no lacerations/open wounds/active bleeding noted. c collar in place by ems for c spine protection.

## 2021-12-24 NOTE — ED PROVIDER NOTE - LATERALITY
Report called Rn to Rn at [facility], [facility]. Spoke with Hannah Cosme RN, report given, opportunity for questions, will print out lab results and provide CT disc for transport. left

## 2021-12-24 NOTE — ED ADULT NURSE NOTE - INCIDENT LOCATION
[facility] Paramedian Forehead Flap Text: A decision was made to reconstruct the defect utilizing an interpolation axial flap and a staged reconstruction.  A telfa template was made of the defect.  This telfa template was then used to outline the paramedian forehead pedicle flap.  The donor area for the pedicle flap was then injected with anesthesia.  The flap was excised through the skin and subcutaneous tissue down to the layer of the underlying musculature.  The pedicle flap was carefully excised within this deep plane to maintain its blood supply.  The edges of the donor site were undermined.   The donor site was closed in a primary fashion.  The pedicle was then rotated into position and sutured.  Once the tube was sutured into place, adequate blood supply was confirmed with blanching and refill.  The pedicle was then wrapped with xeroform gauze and dressed appropriately with a telfa and gauze bandage to ensure continued blood supply and protect the attached pedicle.

## 2021-12-24 NOTE — ED PROVIDER NOTE - PATIENT PORTAL LINK FT
You can access the FollowMyHealth Patient Portal offered by [facility] by registering at the following website: [email or web address]. By joining [employer]’s FollowMyHealth portal, you will also be able to view your health information using other applications (apps) compatible with our system.

## 2021-12-24 NOTE — ED PROVIDER NOTE - MUSCULOSKELETAL NEGATIVE STATEMENT, MLM
Preoperative Diagnosis:  (M47.812) Spondylosis of cervical region without myelopathy or radiculopathy  (primary encounter diagnosis)       Postoperative Diagnosis:  (M47.812) Spondylosis of cervical region without myelopathy or radiculopathy  (primary enco no back pain, no gout, no musculoskeletal pain, no neck pain, and no weakness.

## 2021-12-24 NOTE — ED PROVIDER NOTE - CARE PLAN
1 Principal Discharge DX:	Head injury   Principal Discharge DX:	Head injury  Secondary Diagnosis:	Traumatic hematoma of forehead

## 2022-01-01 ENCOUNTER — EMERGENCY (EMERGENCY)
Facility: HOSPITAL | Age: 87
LOS: 1 days | Discharge: NURSING, REHAB OR LONG-TERM CARE | End: 2022-01-01
Attending: EMERGENCY MEDICINE | Admitting: EMERGENCY MEDICINE
Payer: MEDICARE

## 2022-01-01 ENCOUNTER — EMERGENCY (EMERGENCY)
Facility: HOSPITAL | Age: 87
LOS: 1 days | Discharge: OTHER INSTITUTION | End: 2022-01-01
Attending: EMERGENCY MEDICINE | Admitting: EMERGENCY MEDICINE
Payer: MEDICARE

## 2022-01-01 ENCOUNTER — INPATIENT (INPATIENT)
Facility: HOSPITAL | Age: 87
LOS: 7 days | DRG: 951 | End: 2022-06-11
Attending: INTERNAL MEDICINE | Admitting: INTERNAL MEDICINE
Payer: MEDICARE

## 2022-01-01 ENCOUNTER — EMERGENCY (EMERGENCY)
Facility: HOSPITAL | Age: 87
LOS: 1 days | Discharge: NURSING, REHAB OR LONG-TERM CARE | End: 2022-01-01
Attending: EMERGENCY MEDICINE | Admitting: EMERGENCY MEDICINE
Payer: MEDICARE

## 2022-01-01 VITALS
HEART RATE: 79 BPM | TEMPERATURE: 98 F | WEIGHT: 115.08 LBS | SYSTOLIC BLOOD PRESSURE: 135 MMHG | DIASTOLIC BLOOD PRESSURE: 91 MMHG | RESPIRATION RATE: 18 BRPM | HEIGHT: 63 IN | OXYGEN SATURATION: 100 %

## 2022-01-01 VITALS
TEMPERATURE: 98 F | OXYGEN SATURATION: 100 % | RESPIRATION RATE: 17 BRPM | DIASTOLIC BLOOD PRESSURE: 67 MMHG | HEART RATE: 62 BPM | SYSTOLIC BLOOD PRESSURE: 165 MMHG

## 2022-01-01 VITALS
DIASTOLIC BLOOD PRESSURE: 81 MMHG | SYSTOLIC BLOOD PRESSURE: 179 MMHG | HEIGHT: 63 IN | RESPIRATION RATE: 16 BRPM | HEART RATE: 87 BPM | TEMPERATURE: 98 F | WEIGHT: 110.01 LBS | OXYGEN SATURATION: 99 %

## 2022-01-01 VITALS
OXYGEN SATURATION: 99 % | RESPIRATION RATE: 16 BRPM | HEART RATE: 74 BPM | DIASTOLIC BLOOD PRESSURE: 63 MMHG | SYSTOLIC BLOOD PRESSURE: 154 MMHG | HEIGHT: 63 IN | TEMPERATURE: 98 F | WEIGHT: 139.99 LBS

## 2022-01-01 VITALS
WEIGHT: 125 LBS | HEART RATE: 89 BPM | SYSTOLIC BLOOD PRESSURE: 135 MMHG | OXYGEN SATURATION: 96 % | HEIGHT: 63 IN | TEMPERATURE: 99 F | RESPIRATION RATE: 16 BRPM | DIASTOLIC BLOOD PRESSURE: 81 MMHG

## 2022-01-01 VITALS
SYSTOLIC BLOOD PRESSURE: 129 MMHG | TEMPERATURE: 98 F | DIASTOLIC BLOOD PRESSURE: 73 MMHG | OXYGEN SATURATION: 91 % | HEART RATE: 84 BPM | RESPIRATION RATE: 20 BRPM

## 2022-01-01 VITALS
OXYGEN SATURATION: 97 % | RESPIRATION RATE: 20 BRPM | TEMPERATURE: 98 F | HEART RATE: 96 BPM | DIASTOLIC BLOOD PRESSURE: 76 MMHG | SYSTOLIC BLOOD PRESSURE: 142 MMHG

## 2022-01-01 VITALS
HEART RATE: 81 BPM | DIASTOLIC BLOOD PRESSURE: 68 MMHG | RESPIRATION RATE: 15 BRPM | SYSTOLIC BLOOD PRESSURE: 136 MMHG | OXYGEN SATURATION: 95 % | TEMPERATURE: 99 F

## 2022-01-01 VITALS
SYSTOLIC BLOOD PRESSURE: 149 MMHG | HEART RATE: 70 BPM | RESPIRATION RATE: 15 BRPM | OXYGEN SATURATION: 99 % | DIASTOLIC BLOOD PRESSURE: 76 MMHG | TEMPERATURE: 98 F

## 2022-01-01 VITALS
SYSTOLIC BLOOD PRESSURE: 168 MMHG | TEMPERATURE: 98 F | WEIGHT: 115.08 LBS | HEART RATE: 69 BPM | OXYGEN SATURATION: 100 % | HEIGHT: 63 IN | RESPIRATION RATE: 16 BRPM | DIASTOLIC BLOOD PRESSURE: 88 MMHG

## 2022-01-01 DIAGNOSIS — Z98.890 OTHER SPECIFIED POSTPROCEDURAL STATES: Chronic | ICD-10-CM

## 2022-01-01 DIAGNOSIS — R06.00 DYSPNEA, UNSPECIFIED: ICD-10-CM

## 2022-01-01 DIAGNOSIS — R45.1 RESTLESSNESS AND AGITATION: ICD-10-CM

## 2022-01-01 DIAGNOSIS — R09.89 OTHER SPECIFIED SYMPTOMS AND SIGNS INVOLVING THE CIRCULATORY AND RESPIRATORY SYSTEMS: ICD-10-CM

## 2022-01-01 DIAGNOSIS — Z29.9 ENCOUNTER FOR PROPHYLACTIC MEASURES, UNSPECIFIED: ICD-10-CM

## 2022-01-01 DIAGNOSIS — S06.899A OTHER SPECIFIED INTRACRANIAL INJURY WITH LOSS OF CONSCIOUSNESS OF UNSPECIFIED DURATION, INITIAL ENCOUNTER: ICD-10-CM

## 2022-01-01 DIAGNOSIS — E03.9 HYPOTHYROIDISM, UNSPECIFIED: ICD-10-CM

## 2022-01-01 DIAGNOSIS — R52 PAIN, UNSPECIFIED: ICD-10-CM

## 2022-01-01 DIAGNOSIS — Z51.5 ENCOUNTER FOR PALLIATIVE CARE: ICD-10-CM

## 2022-01-01 DIAGNOSIS — Z91.89 OTHER SPECIFIED PERSONAL RISK FACTORS, NOT ELSEWHERE CLASSIFIED: ICD-10-CM

## 2022-01-01 DIAGNOSIS — I10 ESSENTIAL (PRIMARY) HYPERTENSION: ICD-10-CM

## 2022-01-01 DIAGNOSIS — R53.2 FUNCTIONAL QUADRIPLEGIA: ICD-10-CM

## 2022-01-01 DIAGNOSIS — I61.9 NONTRAUMATIC INTRACEREBRAL HEMORRHAGE, UNSPECIFIED: ICD-10-CM

## 2022-01-01 LAB
ALBUMIN SERPL ELPH-MCNC: 3.5 G/DL — SIGNIFICANT CHANGE UP (ref 3.3–5)
ALP SERPL-CCNC: 113 U/L — SIGNIFICANT CHANGE UP (ref 40–120)
ALT FLD-CCNC: 14 U/L — SIGNIFICANT CHANGE UP (ref 10–45)
ANION GAP SERPL CALC-SCNC: 13 MMOL/L — SIGNIFICANT CHANGE UP (ref 5–17)
APTT BLD: 32 SEC — SIGNIFICANT CHANGE UP (ref 27.5–35.5)
AST SERPL-CCNC: 24 U/L — SIGNIFICANT CHANGE UP (ref 10–40)
BASOPHILS # BLD AUTO: 0 K/UL — SIGNIFICANT CHANGE UP (ref 0–0.2)
BASOPHILS NFR BLD AUTO: 0 % — SIGNIFICANT CHANGE UP (ref 0–2)
BILIRUB SERPL-MCNC: 0.5 MG/DL — SIGNIFICANT CHANGE UP (ref 0.2–1.2)
BUN SERPL-MCNC: 19 MG/DL — SIGNIFICANT CHANGE UP (ref 7–23)
CALCIUM SERPL-MCNC: 9.1 MG/DL — SIGNIFICANT CHANGE UP (ref 8.4–10.5)
CHLORIDE SERPL-SCNC: 100 MMOL/L — SIGNIFICANT CHANGE UP (ref 96–108)
CO2 SERPL-SCNC: 28 MMOL/L — SIGNIFICANT CHANGE UP (ref 22–31)
CREAT SERPL-MCNC: 0.75 MG/DL — SIGNIFICANT CHANGE UP (ref 0.5–1.3)
EGFR: 77 ML/MIN/1.73M2 — SIGNIFICANT CHANGE UP
EOSINOPHIL # BLD AUTO: 0 K/UL — SIGNIFICANT CHANGE UP (ref 0–0.5)
EOSINOPHIL NFR BLD AUTO: 0 % — SIGNIFICANT CHANGE UP (ref 0–6)
GLUCOSE SERPL-MCNC: 111 MG/DL — HIGH (ref 70–99)
HCT VFR BLD CALC: 30.7 % — LOW (ref 34.5–45)
HGB BLD-MCNC: 9.3 G/DL — LOW (ref 11.5–15.5)
IMM GRANULOCYTES NFR BLD AUTO: 0.3 % — SIGNIFICANT CHANGE UP (ref 0–1.5)
INR BLD: 1.02 RATIO — SIGNIFICANT CHANGE UP (ref 0.88–1.16)
LYMPHOCYTES # BLD AUTO: 1.03 K/UL — SIGNIFICANT CHANGE UP (ref 1–3.3)
LYMPHOCYTES # BLD AUTO: 15.3 % — SIGNIFICANT CHANGE UP (ref 13–44)
MCHC RBC-ENTMCNC: 29.3 PG — SIGNIFICANT CHANGE UP (ref 27–34)
MCHC RBC-ENTMCNC: 30.3 GM/DL — LOW (ref 32–36)
MCV RBC AUTO: 96.8 FL — SIGNIFICANT CHANGE UP (ref 80–100)
MONOCYTES # BLD AUTO: 0.52 K/UL — SIGNIFICANT CHANGE UP (ref 0–0.9)
MONOCYTES NFR BLD AUTO: 7.7 % — SIGNIFICANT CHANGE UP (ref 2–14)
NEUTROPHILS # BLD AUTO: 5.16 K/UL — SIGNIFICANT CHANGE UP (ref 1.8–7.4)
NEUTROPHILS NFR BLD AUTO: 76.7 % — SIGNIFICANT CHANGE UP (ref 43–77)
NRBC # BLD: 0 /100 WBCS — SIGNIFICANT CHANGE UP (ref 0–0)
PLATELET # BLD AUTO: 295 K/UL — SIGNIFICANT CHANGE UP (ref 150–400)
POTASSIUM SERPL-MCNC: 4.3 MMOL/L — SIGNIFICANT CHANGE UP (ref 3.5–5.3)
POTASSIUM SERPL-SCNC: 4.3 MMOL/L — SIGNIFICANT CHANGE UP (ref 3.5–5.3)
PROT SERPL-MCNC: 7 G/DL — SIGNIFICANT CHANGE UP (ref 6–8.3)
PROTHROM AB SERPL-ACNC: 11.8 SEC — SIGNIFICANT CHANGE UP (ref 10.5–13.4)
RBC # BLD: 3.17 M/UL — LOW (ref 3.8–5.2)
RBC # FLD: 18.2 % — HIGH (ref 10.3–14.5)
SARS-COV-2 RNA SPEC QL NAA+PROBE: SIGNIFICANT CHANGE UP
SARS-COV-2 RNA SPEC QL NAA+PROBE: SIGNIFICANT CHANGE UP
SODIUM SERPL-SCNC: 141 MMOL/L — SIGNIFICANT CHANGE UP (ref 135–145)
TROPONIN T, HIGH SENSITIVITY RESULT: 25 NG/L — SIGNIFICANT CHANGE UP (ref 0–51)
WBC # BLD: 6.73 K/UL — SIGNIFICANT CHANGE UP (ref 3.8–10.5)
WBC # FLD AUTO: 6.73 K/UL — SIGNIFICANT CHANGE UP (ref 3.8–10.5)

## 2022-01-01 PROCEDURE — 99232 SBSQ HOSP IP/OBS MODERATE 35: CPT

## 2022-01-01 PROCEDURE — 99223 1ST HOSP IP/OBS HIGH 75: CPT

## 2022-01-01 PROCEDURE — 85025 COMPLETE CBC W/AUTO DIFF WBC: CPT

## 2022-01-01 PROCEDURE — 70450 CT HEAD/BRAIN W/O DYE: CPT | Mod: 26,MG

## 2022-01-01 PROCEDURE — 99283 EMERGENCY DEPT VISIT LOW MDM: CPT

## 2022-01-01 PROCEDURE — 73060 X-RAY EXAM OF HUMERUS: CPT | Mod: 26,LT

## 2022-01-01 PROCEDURE — 99233 SBSQ HOSP IP/OBS HIGH 50: CPT

## 2022-01-01 PROCEDURE — 73562 X-RAY EXAM OF KNEE 3: CPT | Mod: 26,50

## 2022-01-01 PROCEDURE — G1004: CPT

## 2022-01-01 PROCEDURE — 70450 CT HEAD/BRAIN W/O DYE: CPT | Mod: MG

## 2022-01-01 PROCEDURE — 86850 RBC ANTIBODY SCREEN: CPT

## 2022-01-01 PROCEDURE — 99284 EMERGENCY DEPT VISIT MOD MDM: CPT | Mod: FS

## 2022-01-01 PROCEDURE — 73060 X-RAY EXAM OF HUMERUS: CPT

## 2022-01-01 PROCEDURE — 73502 X-RAY EXAM HIP UNI 2-3 VIEWS: CPT

## 2022-01-01 PROCEDURE — 71045 X-RAY EXAM CHEST 1 VIEW: CPT | Mod: 26

## 2022-01-01 PROCEDURE — 70486 CT MAXILLOFACIAL W/O DYE: CPT | Mod: 26,MG

## 2022-01-01 PROCEDURE — 99291 CRITICAL CARE FIRST HOUR: CPT | Mod: 25

## 2022-01-01 PROCEDURE — U0005: CPT

## 2022-01-01 PROCEDURE — 93005 ELECTROCARDIOGRAM TRACING: CPT

## 2022-01-01 PROCEDURE — 73030 X-RAY EXAM OF SHOULDER: CPT

## 2022-01-01 PROCEDURE — 86900 BLOOD TYPING SEROLOGIC ABO: CPT

## 2022-01-01 PROCEDURE — 73552 X-RAY EXAM OF FEMUR 2/>: CPT | Mod: 26,RT

## 2022-01-01 PROCEDURE — 70496 CT ANGIOGRAPHY HEAD: CPT | Mod: 26,MA

## 2022-01-01 PROCEDURE — 72125 CT NECK SPINE W/O DYE: CPT | Mod: 26,MG

## 2022-01-01 PROCEDURE — 99284 EMERGENCY DEPT VISIT MOD MDM: CPT | Mod: 25

## 2022-01-01 PROCEDURE — 70450 CT HEAD/BRAIN W/O DYE: CPT | Mod: MA

## 2022-01-01 PROCEDURE — 99285 EMERGENCY DEPT VISIT HI MDM: CPT

## 2022-01-01 PROCEDURE — 73552 X-RAY EXAM OF FEMUR 2/>: CPT

## 2022-01-01 PROCEDURE — 70486 CT MAXILLOFACIAL W/O DYE: CPT | Mod: MG

## 2022-01-01 PROCEDURE — 73562 X-RAY EXAM OF KNEE 3: CPT

## 2022-01-01 PROCEDURE — 82962 GLUCOSE BLOOD TEST: CPT

## 2022-01-01 PROCEDURE — U0003: CPT

## 2022-01-01 PROCEDURE — 85018 HEMOGLOBIN: CPT

## 2022-01-01 PROCEDURE — 73030 X-RAY EXAM OF SHOULDER: CPT | Mod: 26,LT

## 2022-01-01 PROCEDURE — 72125 CT NECK SPINE W/O DYE: CPT | Mod: MA

## 2022-01-01 PROCEDURE — 99232 SBSQ HOSP IP/OBS MODERATE 35: CPT | Mod: GC

## 2022-01-01 PROCEDURE — 84132 ASSAY OF SERUM POTASSIUM: CPT

## 2022-01-01 PROCEDURE — 84484 ASSAY OF TROPONIN QUANT: CPT

## 2022-01-01 PROCEDURE — 70450 CT HEAD/BRAIN W/O DYE: CPT | Mod: 26,MA

## 2022-01-01 PROCEDURE — 82330 ASSAY OF CALCIUM: CPT

## 2022-01-01 PROCEDURE — 85730 THROMBOPLASTIN TIME PARTIAL: CPT

## 2022-01-01 PROCEDURE — 71045 X-RAY EXAM CHEST 1 VIEW: CPT

## 2022-01-01 PROCEDURE — 83605 ASSAY OF LACTIC ACID: CPT

## 2022-01-01 PROCEDURE — 70496 CT ANGIOGRAPHY HEAD: CPT | Mod: MA

## 2022-01-01 PROCEDURE — 72125 CT NECK SPINE W/O DYE: CPT | Mod: MG

## 2022-01-01 PROCEDURE — 85014 HEMATOCRIT: CPT

## 2022-01-01 PROCEDURE — 86901 BLOOD TYPING SEROLOGIC RH(D): CPT

## 2022-01-01 PROCEDURE — 82803 BLOOD GASES ANY COMBINATION: CPT

## 2022-01-01 PROCEDURE — 80053 COMPREHEN METABOLIC PANEL: CPT

## 2022-01-01 PROCEDURE — 73502 X-RAY EXAM HIP UNI 2-3 VIEWS: CPT | Mod: 26,LT

## 2022-01-01 PROCEDURE — 70498 CT ANGIOGRAPHY NECK: CPT | Mod: 26,MA

## 2022-01-01 PROCEDURE — 73502 X-RAY EXAM HIP UNI 2-3 VIEWS: CPT | Mod: 26,RT

## 2022-01-01 PROCEDURE — 99284 EMERGENCY DEPT VISIT MOD MDM: CPT

## 2022-01-01 PROCEDURE — 99221 1ST HOSP IP/OBS SF/LOW 40: CPT

## 2022-01-01 PROCEDURE — 84295 ASSAY OF SERUM SODIUM: CPT

## 2022-01-01 PROCEDURE — 82435 ASSAY OF BLOOD CHLORIDE: CPT

## 2022-01-01 PROCEDURE — 70498 CT ANGIOGRAPHY NECK: CPT | Mod: MA

## 2022-01-01 PROCEDURE — 85610 PROTHROMBIN TIME: CPT

## 2022-01-01 PROCEDURE — 82947 ASSAY GLUCOSE BLOOD QUANT: CPT

## 2022-01-01 PROCEDURE — 72125 CT NECK SPINE W/O DYE: CPT | Mod: 26,MA

## 2022-01-01 RX ORDER — HYDROMORPHONE HYDROCHLORIDE 2 MG/ML
0.5 INJECTION INTRAMUSCULAR; INTRAVENOUS; SUBCUTANEOUS
Refills: 0 | Status: DISCONTINUED | OUTPATIENT
Start: 2022-01-01 | End: 2022-01-01

## 2022-01-01 RX ORDER — ROBINUL 0.2 MG/ML
0.4 INJECTION INTRAMUSCULAR; INTRAVENOUS EVERY 8 HOURS
Refills: 0 | Status: DISCONTINUED | OUTPATIENT
Start: 2022-01-01 | End: 2022-01-01

## 2022-01-01 RX ORDER — HYDROMORPHONE HYDROCHLORIDE 2 MG/ML
1 INJECTION INTRAMUSCULAR; INTRAVENOUS; SUBCUTANEOUS
Refills: 0 | Status: DISCONTINUED | OUTPATIENT
Start: 2022-01-01 | End: 2022-01-01

## 2022-01-01 RX ORDER — DOXAZOSIN MESYLATE 4 MG
4 TABLET ORAL AT BEDTIME
Refills: 0 | Status: DISCONTINUED | OUTPATIENT
Start: 2022-01-01 | End: 2022-01-01

## 2022-01-01 RX ORDER — ACETAMINOPHEN 500 MG
650 TABLET ORAL EVERY 6 HOURS
Refills: 0 | Status: DISCONTINUED | OUTPATIENT
Start: 2022-01-01 | End: 2022-01-01

## 2022-01-01 RX ORDER — LANOLIN ALCOHOL/MO/W.PET/CERES
5 CREAM (GRAM) TOPICAL AT BEDTIME
Refills: 0 | Status: DISCONTINUED | OUTPATIENT
Start: 2022-01-01 | End: 2022-01-01

## 2022-01-01 RX ORDER — HYDROMORPHONE HYDROCHLORIDE 2 MG/ML
0.5 INJECTION INTRAMUSCULAR; INTRAVENOUS; SUBCUTANEOUS EVERY 4 HOURS
Refills: 0 | Status: DISCONTINUED | OUTPATIENT
Start: 2022-01-01 | End: 2022-01-01

## 2022-01-01 RX ORDER — DIVALPROEX SODIUM 500 MG/1
0 TABLET, DELAYED RELEASE ORAL
Qty: 0 | Refills: 0 | DISCHARGE

## 2022-01-01 RX ORDER — ERGOCALCIFEROL 1.25 MG/1
1 CAPSULE ORAL
Qty: 0 | Refills: 0 | DISCHARGE

## 2022-01-01 RX ORDER — LEVOTHYROXINE SODIUM 125 MCG
50 TABLET ORAL DAILY
Refills: 0 | Status: DISCONTINUED | OUTPATIENT
Start: 2022-01-01 | End: 2022-01-01

## 2022-01-01 RX ORDER — AMLODIPINE BESYLATE 2.5 MG/1
10 TABLET ORAL DAILY
Refills: 0 | Status: DISCONTINUED | OUTPATIENT
Start: 2022-01-01 | End: 2022-01-01

## 2022-01-01 RX ORDER — ROBINUL 0.2 MG/ML
0.4 INJECTION INTRAMUSCULAR; INTRAVENOUS EVERY 6 HOURS
Refills: 0 | Status: DISCONTINUED | OUTPATIENT
Start: 2022-01-01 | End: 2022-01-01

## 2022-01-01 RX ORDER — HYDROMORPHONE HYDROCHLORIDE 2 MG/ML
0.2 INJECTION INTRAMUSCULAR; INTRAVENOUS; SUBCUTANEOUS
Refills: 0 | Status: DISCONTINUED | OUTPATIENT
Start: 2022-01-01 | End: 2022-01-01

## 2022-01-01 RX ORDER — QUETIAPINE FUMARATE 200 MG/1
25 TABLET, FILM COATED ORAL AT BEDTIME
Refills: 0 | Status: DISCONTINUED | OUTPATIENT
Start: 2022-01-01 | End: 2022-01-01

## 2022-01-01 RX ORDER — PREGABALIN 225 MG/1
1 CAPSULE ORAL
Qty: 0 | Refills: 0 | DISCHARGE

## 2022-01-01 RX ORDER — METOPROLOL TARTRATE 50 MG
1 TABLET ORAL
Qty: 0 | Refills: 0 | DISCHARGE

## 2022-01-01 RX ORDER — METOPROLOL TARTRATE 50 MG
100 TABLET ORAL
Refills: 0 | Status: DISCONTINUED | OUTPATIENT
Start: 2022-01-01 | End: 2022-01-01

## 2022-01-01 RX ORDER — HYDROMORPHONE HYDROCHLORIDE 2 MG/ML
0.5 INJECTION INTRAMUSCULAR; INTRAVENOUS; SUBCUTANEOUS EVERY 6 HOURS
Refills: 0 | Status: DISCONTINUED | OUTPATIENT
Start: 2022-01-01 | End: 2022-01-01

## 2022-01-01 RX ORDER — HYDRALAZINE HCL 50 MG
5 TABLET ORAL ONCE
Refills: 0 | Status: COMPLETED | OUTPATIENT
Start: 2022-01-01 | End: 2022-01-01

## 2022-01-01 RX ORDER — LISINOPRIL 2.5 MG/1
40 TABLET ORAL DAILY
Refills: 0 | Status: DISCONTINUED | OUTPATIENT
Start: 2022-01-01 | End: 2022-01-01

## 2022-01-01 RX ORDER — ACETAMINOPHEN 500 MG
650 TABLET ORAL ONCE
Refills: 0 | Status: COMPLETED | OUTPATIENT
Start: 2022-01-01 | End: 2022-01-01

## 2022-01-01 RX ORDER — HYDRALAZINE HCL 50 MG
10 TABLET ORAL EVERY 4 HOURS
Refills: 0 | Status: DISCONTINUED | OUTPATIENT
Start: 2022-01-01 | End: 2022-01-01

## 2022-01-01 RX ORDER — ONDANSETRON 8 MG/1
4 TABLET, FILM COATED ORAL EVERY 6 HOURS
Refills: 0 | Status: DISCONTINUED | OUTPATIENT
Start: 2022-01-01 | End: 2022-01-01

## 2022-01-01 RX ORDER — LEVETIRACETAM 250 MG/1
1000 TABLET, FILM COATED ORAL EVERY 12 HOURS
Refills: 0 | Status: DISCONTINUED | OUTPATIENT
Start: 2022-01-01 | End: 2022-01-01

## 2022-01-01 RX ORDER — VALPROIC ACID (AS SODIUM SALT) 250 MG/5ML
500 SOLUTION, ORAL ORAL
Refills: 0 | Status: DISCONTINUED | OUTPATIENT
Start: 2022-01-01 | End: 2022-01-01

## 2022-01-01 RX ORDER — HYDRALAZINE HCL 50 MG
10 TABLET ORAL ONCE
Refills: 0 | Status: COMPLETED | OUTPATIENT
Start: 2022-01-01 | End: 2022-01-01

## 2022-01-01 RX ORDER — AMLODIPINE BESYLATE 2.5 MG/1
1 TABLET ORAL
Qty: 0 | Refills: 0 | DISCHARGE

## 2022-01-01 RX ORDER — ACETAMINOPHEN 500 MG
2 TABLET ORAL
Qty: 0 | Refills: 0 | DISCHARGE

## 2022-01-01 RX ORDER — MELOXICAM 15 MG/1
1 TABLET ORAL
Qty: 0 | Refills: 0 | DISCHARGE

## 2022-01-01 RX ORDER — LEVOTHYROXINE SODIUM 125 MCG
25 TABLET ORAL AT BEDTIME
Refills: 0 | Status: DISCONTINUED | OUTPATIENT
Start: 2022-01-01 | End: 2022-01-01

## 2022-01-01 RX ORDER — SENNA PLUS 8.6 MG/1
2 TABLET ORAL AT BEDTIME
Refills: 0 | Status: DISCONTINUED | OUTPATIENT
Start: 2022-01-01 | End: 2022-01-01

## 2022-01-01 RX ORDER — PREGABALIN 225 MG/1
1000 CAPSULE ORAL DAILY
Refills: 0 | Status: DISCONTINUED | OUTPATIENT
Start: 2022-01-01 | End: 2022-01-01

## 2022-01-01 RX ORDER — LEVOTHYROXINE SODIUM 125 MCG
1 TABLET ORAL
Qty: 0 | Refills: 0 | DISCHARGE

## 2022-01-01 RX ADMIN — ROBINUL 0.4 MILLIGRAM(S): 0.2 INJECTION INTRAMUSCULAR; INTRAVENOUS at 18:12

## 2022-01-01 RX ADMIN — LEVETIRACETAM 400 MILLIGRAM(S): 250 TABLET, FILM COATED ORAL at 05:24

## 2022-01-01 RX ADMIN — Medication 25 MICROGRAM(S): at 21:27

## 2022-01-01 RX ADMIN — LEVETIRACETAM 400 MILLIGRAM(S): 250 TABLET, FILM COATED ORAL at 05:07

## 2022-01-01 RX ADMIN — Medication 55 MILLIGRAM(S): at 06:05

## 2022-01-01 RX ADMIN — HYDROMORPHONE HYDROCHLORIDE 0.5 MILLIGRAM(S): 2 INJECTION INTRAMUSCULAR; INTRAVENOUS; SUBCUTANEOUS at 23:05

## 2022-01-01 RX ADMIN — Medication 5 MILLIGRAM(S): at 21:46

## 2022-01-01 RX ADMIN — Medication 10 MILLIGRAM(S): at 09:37

## 2022-01-01 RX ADMIN — HYDROMORPHONE HYDROCHLORIDE 1 MILLIGRAM(S): 2 INJECTION INTRAMUSCULAR; INTRAVENOUS; SUBCUTANEOUS at 12:52

## 2022-01-01 RX ADMIN — HYDROMORPHONE HYDROCHLORIDE 0.5 MILLIGRAM(S): 2 INJECTION INTRAMUSCULAR; INTRAVENOUS; SUBCUTANEOUS at 12:25

## 2022-01-01 RX ADMIN — Medication 10 MILLIGRAM(S): at 00:44

## 2022-01-01 RX ADMIN — Medication 650 MILLIGRAM(S): at 17:27

## 2022-01-01 RX ADMIN — Medication 55 MILLIGRAM(S): at 17:03

## 2022-01-01 RX ADMIN — LEVETIRACETAM 400 MILLIGRAM(S): 250 TABLET, FILM COATED ORAL at 17:28

## 2022-01-01 RX ADMIN — HYDROMORPHONE HYDROCHLORIDE 0.5 MILLIGRAM(S): 2 INJECTION INTRAMUSCULAR; INTRAVENOUS; SUBCUTANEOUS at 23:58

## 2022-01-01 RX ADMIN — ROBINUL 0.4 MILLIGRAM(S): 0.2 INJECTION INTRAMUSCULAR; INTRAVENOUS at 12:37

## 2022-01-01 RX ADMIN — Medication 55 MILLIGRAM(S): at 05:06

## 2022-01-01 RX ADMIN — Medication 10 MILLIGRAM(S): at 08:51

## 2022-01-01 RX ADMIN — Medication 55 MILLIGRAM(S): at 17:19

## 2022-01-01 RX ADMIN — Medication 650 MILLIGRAM(S): at 14:03

## 2022-01-01 RX ADMIN — ROBINUL 0.4 MILLIGRAM(S): 0.2 INJECTION INTRAMUSCULAR; INTRAVENOUS at 12:58

## 2022-01-01 RX ADMIN — Medication 55 MILLIGRAM(S): at 06:10

## 2022-01-01 RX ADMIN — HYDROMORPHONE HYDROCHLORIDE 0.5 MILLIGRAM(S): 2 INJECTION INTRAMUSCULAR; INTRAVENOUS; SUBCUTANEOUS at 17:28

## 2022-01-01 RX ADMIN — Medication 25 MICROGRAM(S): at 21:56

## 2022-01-01 RX ADMIN — Medication 55 MILLIGRAM(S): at 19:37

## 2022-01-01 RX ADMIN — HYDROMORPHONE HYDROCHLORIDE 0.5 MILLIGRAM(S): 2 INJECTION INTRAMUSCULAR; INTRAVENOUS; SUBCUTANEOUS at 23:14

## 2022-01-01 RX ADMIN — Medication 0.5 MILLIGRAM(S): at 02:07

## 2022-01-01 RX ADMIN — LEVETIRACETAM 400 MILLIGRAM(S): 250 TABLET, FILM COATED ORAL at 17:26

## 2022-01-01 RX ADMIN — Medication 55 MILLIGRAM(S): at 18:20

## 2022-01-01 RX ADMIN — LEVETIRACETAM 400 MILLIGRAM(S): 250 TABLET, FILM COATED ORAL at 06:05

## 2022-01-01 RX ADMIN — Medication 10 MILLIGRAM(S): at 10:15

## 2022-01-01 RX ADMIN — HYDROMORPHONE HYDROCHLORIDE 0.5 MILLIGRAM(S): 2 INJECTION INTRAMUSCULAR; INTRAVENOUS; SUBCUTANEOUS at 17:04

## 2022-01-01 RX ADMIN — ROBINUL 0.4 MILLIGRAM(S): 0.2 INJECTION INTRAMUSCULAR; INTRAVENOUS at 05:24

## 2022-01-01 RX ADMIN — HYDROMORPHONE HYDROCHLORIDE 1 MILLIGRAM(S): 2 INJECTION INTRAMUSCULAR; INTRAVENOUS; SUBCUTANEOUS at 16:12

## 2022-01-01 RX ADMIN — Medication 10 MILLIGRAM(S): at 13:43

## 2022-01-01 RX ADMIN — HYDROMORPHONE HYDROCHLORIDE 0.5 MILLIGRAM(S): 2 INJECTION INTRAMUSCULAR; INTRAVENOUS; SUBCUTANEOUS at 12:58

## 2022-01-01 RX ADMIN — HYDROMORPHONE HYDROCHLORIDE 0.5 MILLIGRAM(S): 2 INJECTION INTRAMUSCULAR; INTRAVENOUS; SUBCUTANEOUS at 12:23

## 2022-01-01 RX ADMIN — HYDROMORPHONE HYDROCHLORIDE 0.5 MILLIGRAM(S): 2 INJECTION INTRAMUSCULAR; INTRAVENOUS; SUBCUTANEOUS at 17:34

## 2022-01-01 RX ADMIN — Medication 55 MILLIGRAM(S): at 17:05

## 2022-01-01 RX ADMIN — LEVETIRACETAM 400 MILLIGRAM(S): 250 TABLET, FILM COATED ORAL at 17:33

## 2022-01-01 RX ADMIN — LEVETIRACETAM 400 MILLIGRAM(S): 250 TABLET, FILM COATED ORAL at 18:08

## 2022-01-01 RX ADMIN — Medication 55 MILLIGRAM(S): at 18:09

## 2022-01-01 RX ADMIN — Medication 0.5 MILLIGRAM(S): at 07:24

## 2022-01-01 RX ADMIN — Medication 650 MILLIGRAM(S): at 15:30

## 2022-01-01 RX ADMIN — LEVETIRACETAM 400 MILLIGRAM(S): 250 TABLET, FILM COATED ORAL at 18:41

## 2022-01-01 RX ADMIN — ROBINUL 0.4 MILLIGRAM(S): 0.2 INJECTION INTRAMUSCULAR; INTRAVENOUS at 12:24

## 2022-01-01 RX ADMIN — ROBINUL 0.4 MILLIGRAM(S): 0.2 INJECTION INTRAMUSCULAR; INTRAVENOUS at 01:05

## 2022-01-01 RX ADMIN — HYDROMORPHONE HYDROCHLORIDE 1 MILLIGRAM(S): 2 INJECTION INTRAMUSCULAR; INTRAVENOUS; SUBCUTANEOUS at 09:37

## 2022-01-01 RX ADMIN — ROBINUL 0.4 MILLIGRAM(S): 0.2 INJECTION INTRAMUSCULAR; INTRAVENOUS at 05:18

## 2022-01-01 RX ADMIN — ROBINUL 0.4 MILLIGRAM(S): 0.2 INJECTION INTRAMUSCULAR; INTRAVENOUS at 23:14

## 2022-01-01 RX ADMIN — Medication 25 MICROGRAM(S): at 21:23

## 2022-01-01 RX ADMIN — Medication 650 MILLIGRAM(S): at 15:17

## 2022-01-01 RX ADMIN — ROBINUL 0.4 MILLIGRAM(S): 0.2 INJECTION INTRAMUSCULAR; INTRAVENOUS at 06:04

## 2022-01-01 RX ADMIN — Medication 650 MILLIGRAM(S): at 17:36

## 2022-01-01 RX ADMIN — ROBINUL 0.4 MILLIGRAM(S): 0.2 INJECTION INTRAMUSCULAR; INTRAVENOUS at 23:57

## 2022-01-01 RX ADMIN — LEVETIRACETAM 400 MILLIGRAM(S): 250 TABLET, FILM COATED ORAL at 05:08

## 2022-01-01 RX ADMIN — Medication 55 MILLIGRAM(S): at 05:24

## 2022-01-01 RX ADMIN — ROBINUL 0.4 MILLIGRAM(S): 0.2 INJECTION INTRAMUSCULAR; INTRAVENOUS at 17:27

## 2022-01-01 RX ADMIN — ROBINUL 0.4 MILLIGRAM(S): 0.2 INJECTION INTRAMUSCULAR; INTRAVENOUS at 12:23

## 2022-01-01 RX ADMIN — HYDROMORPHONE HYDROCHLORIDE 0.5 MILLIGRAM(S): 2 INJECTION INTRAMUSCULAR; INTRAVENOUS; SUBCUTANEOUS at 17:27

## 2022-01-01 RX ADMIN — HYDROMORPHONE HYDROCHLORIDE 0.5 MILLIGRAM(S): 2 INJECTION INTRAMUSCULAR; INTRAVENOUS; SUBCUTANEOUS at 08:58

## 2022-01-01 RX ADMIN — ROBINUL 0.4 MILLIGRAM(S): 0.2 INJECTION INTRAMUSCULAR; INTRAVENOUS at 17:33

## 2022-01-01 RX ADMIN — ROBINUL 0.4 MILLIGRAM(S): 0.2 INJECTION INTRAMUSCULAR; INTRAVENOUS at 11:45

## 2022-01-01 RX ADMIN — HYDROMORPHONE HYDROCHLORIDE 0.5 MILLIGRAM(S): 2 INJECTION INTRAMUSCULAR; INTRAVENOUS; SUBCUTANEOUS at 08:31

## 2022-01-01 RX ADMIN — HYDROMORPHONE HYDROCHLORIDE 0.5 MILLIGRAM(S): 2 INJECTION INTRAMUSCULAR; INTRAVENOUS; SUBCUTANEOUS at 05:18

## 2022-01-01 RX ADMIN — LEVETIRACETAM 400 MILLIGRAM(S): 250 TABLET, FILM COATED ORAL at 16:54

## 2022-01-01 RX ADMIN — LEVETIRACETAM 400 MILLIGRAM(S): 250 TABLET, FILM COATED ORAL at 17:08

## 2022-01-01 RX ADMIN — HYDROMORPHONE HYDROCHLORIDE 0.5 MILLIGRAM(S): 2 INJECTION INTRAMUSCULAR; INTRAVENOUS; SUBCUTANEOUS at 05:25

## 2022-01-01 RX ADMIN — HYDROMORPHONE HYDROCHLORIDE 0.5 MILLIGRAM(S): 2 INJECTION INTRAMUSCULAR; INTRAVENOUS; SUBCUTANEOUS at 01:04

## 2022-01-01 RX ADMIN — HYDROMORPHONE HYDROCHLORIDE 0.5 MILLIGRAM(S): 2 INJECTION INTRAMUSCULAR; INTRAVENOUS; SUBCUTANEOUS at 06:05

## 2022-01-01 RX ADMIN — HYDROMORPHONE HYDROCHLORIDE 0.5 MILLIGRAM(S): 2 INJECTION INTRAMUSCULAR; INTRAVENOUS; SUBCUTANEOUS at 11:45

## 2022-01-01 RX ADMIN — ROBINUL 0.4 MILLIGRAM(S): 0.2 INJECTION INTRAMUSCULAR; INTRAVENOUS at 17:05

## 2022-01-01 RX ADMIN — Medication 55 MILLIGRAM(S): at 19:29

## 2022-01-01 RX ADMIN — HYDROMORPHONE HYDROCHLORIDE 0.5 MILLIGRAM(S): 2 INJECTION INTRAMUSCULAR; INTRAVENOUS; SUBCUTANEOUS at 18:09

## 2022-01-01 RX ADMIN — ROBINUL 0.4 MILLIGRAM(S): 0.2 INJECTION INTRAMUSCULAR; INTRAVENOUS at 23:05

## 2022-01-01 RX ADMIN — Medication 55 MILLIGRAM(S): at 06:18

## 2022-01-01 RX ADMIN — ROBINUL 0.4 MILLIGRAM(S): 0.2 INJECTION INTRAMUSCULAR; INTRAVENOUS at 23:32

## 2022-01-01 RX ADMIN — LEVETIRACETAM 400 MILLIGRAM(S): 250 TABLET, FILM COATED ORAL at 05:37

## 2022-01-01 RX ADMIN — Medication 55 MILLIGRAM(S): at 05:17

## 2022-01-01 RX ADMIN — LEVETIRACETAM 400 MILLIGRAM(S): 250 TABLET, FILM COATED ORAL at 17:37

## 2022-01-01 RX ADMIN — LEVETIRACETAM 400 MILLIGRAM(S): 250 TABLET, FILM COATED ORAL at 06:27

## 2022-01-01 RX ADMIN — Medication 25 MICROGRAM(S): at 22:20

## 2022-01-01 RX ADMIN — HYDROMORPHONE HYDROCHLORIDE 0.5 MILLIGRAM(S): 2 INJECTION INTRAMUSCULAR; INTRAVENOUS; SUBCUTANEOUS at 05:45

## 2022-01-01 RX ADMIN — Medication 55 MILLIGRAM(S): at 05:08

## 2022-01-01 RX ADMIN — LEVETIRACETAM 400 MILLIGRAM(S): 250 TABLET, FILM COATED ORAL at 05:40

## 2022-01-01 RX ADMIN — Medication 25 MICROGRAM(S): at 22:38

## 2022-01-01 RX ADMIN — HYDROMORPHONE HYDROCHLORIDE 0.5 MILLIGRAM(S): 2 INJECTION INTRAMUSCULAR; INTRAVENOUS; SUBCUTANEOUS at 23:32

## 2022-01-01 RX ADMIN — HYDROMORPHONE HYDROCHLORIDE 0.5 MILLIGRAM(S): 2 INJECTION INTRAMUSCULAR; INTRAVENOUS; SUBCUTANEOUS at 12:36

## 2022-01-01 RX ADMIN — HYDROMORPHONE HYDROCHLORIDE 0.5 MILLIGRAM(S): 2 INJECTION INTRAMUSCULAR; INTRAVENOUS; SUBCUTANEOUS at 17:05

## 2022-01-01 RX ADMIN — HYDROMORPHONE HYDROCHLORIDE 1 MILLIGRAM(S): 2 INJECTION INTRAMUSCULAR; INTRAVENOUS; SUBCUTANEOUS at 07:24

## 2022-01-01 RX ADMIN — Medication 25 MICROGRAM(S): at 21:11

## 2022-01-01 RX ADMIN — Medication 650 MILLIGRAM(S): at 14:52

## 2022-01-01 RX ADMIN — ROBINUL 0.4 MILLIGRAM(S): 0.2 INJECTION INTRAMUSCULAR; INTRAVENOUS at 05:45

## 2022-01-08 NOTE — ED PROVIDER NOTE - IV ALTEPLASE EXCL ABS HIDDEN
Rec'd pt via [facility] with c/o abdominal pain  That radiates to Lower back. C/o unable to urinated x several days. C/o viviana color urine. Emesis x 1 yesterday.    show

## 2022-02-17 NOTE — ED PROVIDER NOTE - PATIENT PORTAL LINK FT
You can access the FollowMyHealth Patient Portal offered by [facility] by registering at the following website: [email or web address]. By joining [employer]’s FollowMyHealth portal, you will also be able to view your health information using other applications (apps) compatible with our system.

## 2022-02-17 NOTE — ED ADULT NURSE NOTE - CHIEF COMPLAINT QUOTE
Female from [facility], brought in by ambulance for left shoulder pain eval. pt has hx dementia, tenderness to left shoulder noted.

## 2022-02-17 NOTE — ED ADULT TRIAGE NOTE - CHIEF COMPLAINT QUOTE
left shoulder pain Female from [facility], brought in by ambulance for left shoulder pain eval. pt has hx dementia, tenderness to left shoulder noted.

## 2022-02-17 NOTE — ED PROVIDER NOTE - PROGRESS NOTE DETAILS
Reevaluated patient at bedside.  Patient more comfortable after tylenol.  RN d/w assisted living, they will accept patient back.  Patient will return with any changes, concerns or persistent / worsening symptoms.

## 2022-02-17 NOTE — ED PROVIDER NOTE - PROVIDER TOKENS
PROVIDER:[TOKEN:[[id number]],FOLLOWUP:[1-3 Days]],PROVIDER:[TOKEN:[[id number]],FOLLOWUP:[1-3 Days]]

## 2022-02-17 NOTE — ED PROVIDER NOTE - CLINICAL SUMMARY MEDICAL DECISION MAKING FREE TEXT BOX
Pt with left shoulder pain and tenderness since yesterday. Unknown trauma. Pt unable to provide history, secondary to dementia. Plan for X-ray and Tylenol.

## 2022-02-17 NOTE — ED ADULT NURSE NOTE - OBJECTIVE STATEMENT
89 y/o female received awake alert, disoriented, Gambian speaking, bibems from [facility] for left shoulder pain eval. pt has hx dementia and is a poor historian but she's noted guarding left arm/shoulder, no obvious signs of trauma/deformity noted to left shoulder but tenderness is noted to area on palpation. EMS reports no witnessed fall, staff noticed pt expressing left shoulder pain. pt has know hx of multiple falls in the past

## 2022-02-17 NOTE — ED CLERICAL - CLERICAL COMMENTS
called [facility] ems for transport back to [facility].  [facility] will  patient between 1700 and 1730

## 2022-02-17 NOTE — ED PROVIDER NOTE - OBJECTIVE STATEMENT
87 y/o female with PMHx of breast cancer, HTN and dementia presents to the ED BIBEMS from [facility] for left shoulder pain and tenderness. No known trauma. Per EMS report, pain and tenderness was noted by staff yesterday. Pt unable to provide history, secondary to dementia. PMD: Dr. Wang.

## 2022-02-17 NOTE — ED PROVIDER NOTE - CARE PROVIDER_API CALL
Angel Wang)  Medicine  [address] [place] [address]  Phone: [phone]  Fax: [phone]  Follow Up Time: 1-3 Days    Javi Phillips ()  Orthopaedic Surgery  [address] [place] [address]  Phone: [phone]  Fax: [phone]  Follow Up Time: 1-3 Days

## 2022-03-13 NOTE — ED PROVIDER NOTE - BIRTH SEX
Report called to [facility], nurse at [facility].      Jesus Lindsey RN  04/26/19 3662 Female

## 2022-03-13 NOTE — ED PROVIDER NOTE - EXTREMITY EXAM
left upper extremity findings left upper extremity findings/right upper extremity findings/left lower extremity findings/right lower extremity findings

## 2022-03-13 NOTE — ED PROVIDER NOTE - CARE PROVIDER_API CALL
Angel Wang)  Medicine  [address] [place] [address]  Phone: [phone]  Fax: [phone]  Follow Up Time: 1-3 Days    Javi Phillips ()  Orthopaedic Surgery  [address] [place] [address]  Phone: [phone]  Fax: [phone]  Follow Up Time: 1-3 Days

## 2022-03-13 NOTE — ED PROVIDER NOTE - PATIENT PORTAL LINK FT
You can access the FollowMyHealth Patient Portal offered by [facility] by registering at the following website: [email or web address]. By joining "[employer]’s FollowMyHealth portal, you will also be able to view your health information using other applications (apps) compatible with our system.

## 2022-03-13 NOTE — ED ADULT NURSE NOTE - IN THE PAST 12 MONTHS HAVE YOU USED DRUGS OTHER THAN THOSE REQUIRED FOR MEDICAL REASON?
discharge paperwork completed. Patient and wife signed, IVs removed, patient dressed. RN transported patient and wife to [facility], called taxi, security waiting with discharged patient until taxi comes.    No

## 2022-03-13 NOTE — ED PROVIDER NOTE - PROGRESS NOTE DETAILS
pt improved after tylenol pt improved after tylenol. RN d/w assisted living, they will accept pt back

## 2022-03-13 NOTE — ED PROVIDER NOTE - OBJECTIVE STATEMENT
87 y/o female with PMHx of breast cancer s/p left mastectomy, dementia, HTN, H/O colon resection presents to the ED BIBEMS from [facility] for left shoulder pain and tenderness, noted by staff. Per EMS report, no known trauma. Pt is a poor historian, secondary to dementia. Denies any chest pain, SOB, abdominal pain, trauma or any other pain. PMD: Dr. Wang.  used, id# [id number].

## 2022-03-13 NOTE — ED ADULT NURSE NOTE - OBJECTIVE STATEMENT
patient is from [facility], c/o left arm and shoulder pain since this morning, Pt is in no acute distress, as per EMS, there is no report for trauma from facility, no bruise or bleeding noted, pulse +, pending xray, will continue to monitor. patient is from [facility], c/o left arm and shoulder pain since this morning, Pt is in no acute distress, as per EMS, there is no report for trauma from facility, no bruise or bleeding noted, pulse +, pending xray, will continue to monitor.    : Mo, [id number]

## 2022-04-04 NOTE — ED ADULT NURSE NOTE - NSIMPLEMENTINTERV_GEN_ALL_ED
Implemented All Fall with Harm Risk Interventions:  Brush to call system. Call bell, personal items and telephone within reach. Instruct patient to call for assistance. Room bathroom lighting operational. Non-slip footwear when patient is off stretcher. Physically safe environment: no spills, clutter or unnecessary equipment. Stretcher in lowest position, wheels locked, appropriate side rails in place. Provide visual cue, wrist band, yellow gown, etc. Monitor gait and stability. Monitor for mental status changes and reorient to person, place, and time. Review medications for side effects contributing to fall risk. Reinforce activity limits and safety measures with patient and family. Provide visual clues: red socks.

## 2022-04-04 NOTE — ED ADULT TRIAGE NOTE - CHIEF COMPLAINT QUOTE
PT BIB EMS from [facility] c/o unwitnessed fall; wheelchair to floor; no blood thinners; hx dementia

## 2022-04-04 NOTE — ED PROVIDER NOTE - CARE PROVIDER_API CALL
[place] [address]  Phone: [phone]  Fax: [phone]  Follow Up Time: 1-3 Days

## 2022-04-04 NOTE — ED PROVIDER NOTE - NSFOLLOWUPINSTRUCTIONS_ED_ALL_ED_FT
tylenol over the counter as needed for pain  ice  follow up with primary care provider         Lesión en la keke en los adultos    Head Injury, Adult       Hay muchos tipos de lesiones en la keke. Las lesiones en la keke pueden ser tan leves benson un chichón pequeño o pueden ser un problema médico grave. Algunas de las lesiones graves en la keke son:  •Lesión que provoque un impacto en el cerebro (conmoción cerebral).      •Un moretón (contusión) en el cerebro. [place] significa que hay hemorragia en el cerebro que puede causar hinchazón.      •Fisura en el cráneo (fractura de cráneo).      •Hemorragia en el cerebro que se acumula, se coagula y forma alycia protuberancia (hematoma).      Después de alycia lesión en la keke, la mayoría de los problemas ocurren dentro de las primeras 24 horas, vaughn los efectos secundarios pueden aparecer entre 7 y 10 días después de la lesión. Es importante controlar canada afección para joan si hay cambios. Es posible que deban observarlo en el departamento de emergencias o en el servicio de atención urgente, o también puede ser que deban hospitalizarlo.      ¿Cuáles son las causas?    Hay muchas causas posibles de alycia lesión en la keke. Las lesiones graves en la keke puede deberse a un accidente automovilístico, a accidentes en bicicleta o motocicleta, a lesiones deportivas, a caídas y a ser golpeado por un objeto.      ¿Cuáles son los síntomas?    Los síntomas de lesión en la keke incluyen alycia contusión, un chichón o sangrado en el lugar de la lesión. Otros síntomas físicos pueden ser:  •Dolor de keke.      •Náuseas o vómitos.      •Mareos.      •Visión borrosa o doble.      •Sentir incomodidad cerca de luces brillantes o ruidos tamra.      •Convulsiones.      •Cansancio.      •Dificultad para despertarse.      •Pérdida de la conciencia.      Los síntomas mentales o emocionales pueden incluir los siguientes:  •Irritabilidad.      •Confusión y problemas de memoria.      •Falta de atención y concentración.      •Cambios en los hábitos de alimentación o en el sueño.      •Sentir ansiedad o depresión.        ¿Cómo se diagnostica?    Esta afección suele diagnosticarse en función de los síntomas, de alycia descripción de la lesión y de un examen físico. También pueden hacerle estudios de diagnóstico por imágenes, benson alycia resonancia magnética (RM) o alycia exploración por tomografía computarizada (TC).      ¿Cómo se trata?    El tratamiento de esta afección depende de la gravedad y el tipo de lesión que sufrió. El objetivo principal del tratamiento es prevenir complicaciones y darle tiempo al cerebro para que se recupere.    Lesión de keke leve     Si usted sufre alycia lesión de keke leve, es posible que lo envíen a casa, y el tratamiento puede incluir lo siguiente:  •Observación. Un adulto responsable debe quedarse con usted myah 24 horas después de producida la lesión y controlarlo con frecuencia.      •Benkelman físico.      •Benkelman cerebral.      •Analgésicos.      Lesión de keke grave    Si tiene alycia lesión de keke grave, el tratamiento puede incluir lo siguiente:•Observación minuciosa. [place] incluye hospitalización con la siguiente atención:  •Exámenes físicos frecuentes.      •Controles frecuentes del funcionamiento del cerebro y el sistema nervioso (estado neurológico).      •Control de la presión arterial y los niveles de oxígeno.        •Medicamentos para aliviar el dolor, prevenir las convulsiones y disminuir la hinchazón del cerebro.      •Protección de las vías respiratorias y asistencia respiratoria. [place] puede incluir un respirador.      •Tratamientos para controlar y tratar la hinchazón dentro del cerebro.    •Cirugía de cerebro. Esta puede ser necesaria en los siguientes casos:  •Extraer alycia acumulación de jazmin o coágulos de jazmin.      •Interrumpir el sangrado.      •Retirar alycia parte del cráneo para dejar espacio a fin de que el cerebro se hinche.          Siga estas instrucciones en canada casa:    Actividad     •Descanse y evite actividades que jonathan físicamente difíciles o agotadoras.      •Asegúrese de dormir lo suficiente.    •Deje que el cerebro descanse limitando las actividades que requieran pensar mucho o prestar atención, benson las siguientes:  •Mirar televisión.      •Jugar juegos de memoria y armar rompecabezas.      •Tareas para el hogar o trabajos relacionados con el empleo.      •Trabajar en la computadora, usar las redes sociales y enviar mensajes de texto.        •Evite actividades que puedan causar otra lesión en la keke, benson practicar deportes, hasta que el médico lo autorice. Puede ser peligroso tener otra lesión en la keke antes de que se haya recuperado de la primera.      •Pregúntele al médico cuándo puede retomar zelda actividades habituales, incluidos el trabajo o el estudio. Pídale al médico un plan escalonado para retomar zelda actividades de forma gradual.      •Consulte a canada médico sobre cuándo puede conducir, andar en bicicleta o usar maquinaria pesada. La capacidad para reaccionar puede ser más lenta luego de alycia lesión cerebral. No realice estas actividades si se siente mareado.        Estilo de esvin      • No jewel alcohol hasta que el médico lo autorice. No consuma drogas. El alcohol y ciertas drogas pueden demorar canada recuperación y ponerlo en riesgo de nuevas lesiones.      •Si le resulta más difícil que lo habitual recordar las cosas, escríbalas.      •Trate de hacer alycia cosa por vez si se distrae con facilidad.      •Consulte con familiares y amigos si debe naomi decisiones importantes.      •Cuénteles sobre canada lesión, los síntomas y las restricciones a zelda amigos, a canada elisha, a un colega de confianza y a canada gerente en el trabajo. Pídales que observen si aparecen nuevos problemas o empeoran los existentes.      Instrucciones generales     •Inyokern los medicamentos de venta maira y los recetados solamente benson se lo haya indicado el médico.      •Pídale a alguien que lo acompañe myah 24 horas después de la lesión en la keke. Esta persona debe observar cambios en los síntomas y estar preparada para obtener ayuda médica.      •Concurra a todas las visitas de seguimiento benson se lo haya indicado el médico. [place] es importante.        ¿Cómo se mahi?    •Trabaje para mejorar el equilibrio y la fuerza a fin de evitar caídas.      •Use el cinturón de seguridad cuando se encuentre en un vehículo en movimiento.      •Use un anmol al andar en bicicleta, esquiar o practicar cualquier otro deporte o actividad que tenga riesgo de lesiones.    •Si althea alcohol:•Limite la cantidad que althea:  •De 0 a 1 medida por día para las mujeres que no estén embarazadas.      •De 0 a 2 medidas por día para los hombres.        •Esté atento a la cantidad de alcohol que hay en las bebidas que mary. En los [place], alycia medida equivale a alycia botella de cerveza de 12 oz (355 ml), un vaso de vino de 5 oz (148 ml) o un vaso de alycia bebida alcohólica de patti graduación de 1½ oz (44 ml).      •In[place] medidas de seguridad en canada hogar, por ejemplo:  •Mantenga los pisos y las escaleras en orden.      •Coloque barras para sostén en los lobito y pasamanos en las escaleras.      •Ponga alfombras antideslizantes en pisos y bañeras.      •Mejore la iluminación en las zonas de penumbra.          Dónde buscar más información    •Centers for Disease Control and Prevention (Centros para el Control y la Prevención de Enfermedades): www.cdc.gov        Solicite ayuda de inmediato si:  •Tiene lo siguiente:  •Dolor de keke intenso que no desaparece con los medicamentos.      •Dificultad para caminar o debilidad en los brazos o las piernas.      •Secreción transparente o con jazmin que proviene de la nariz o de los oídos.      •Cambios en la visión.      •Alycia convulsión.      •Mayor confusión o irritabilidad.        •Zelda síntomas empeoran.      •Está más somnoliento de lo normal o tiene dificultad para mantenerse despierto.      •Pierde el equilibrio.      •Las pupilas cambian de tamaño.      •Arrastra las palabras.      •[place] mareo empeora.      •Vomita.      Estos síntomas pueden representar un problema grave que constituye alycia emergencia. No espere a joan si los síntomas desaparecen. Solicite atención médica de inmediato. Comuníquese con el servicio de emergencias de canada localidad (911 en los [place]). No conduzca por zelda propios medios hasta el hospital.       Resumen    •Las lesiones en la keke pueden ser leves o pueden ser un problema médico grave que requiere atención inmediata.      •El tratamiento de esta afección depende de la gravedad y el tipo de lesión que sufrió.      •Pídale a alguien que lo acompañe myah 24 horas después de la lesión y que dominique cómo está usted con frecuencia.      •Pregúntele al médico cuándo puede retomar zelda actividades habituales, incluidos el trabajo o el estudio.      •La prevención de lesiones en la keke incluye el uso de cinturón de seguridad en un vehículo motorizado, el uso de anmol en bicicleta, limitar el consumo de alcohol y naomi medidas de seguridad en el hogar.      Esta información no tiene benson fin reemplazar el consejo del médico. Asegúrese de hacerle al médico cualquier pregunta que tenga.

## 2022-04-04 NOTE — ED ADULT NURSE NOTE - OBJECTIVE STATEMENT
pt sent from [facility] s/p unwitnessed fall out of wheelchair, as per transfer paperwork, Staff states pt was complaining of left hip pain and Left leg pain pt with hx of dementia, unable to obtain reliable hx secondary to dementia. Pt denies pain. unknown LOC, pt maex4

## 2022-04-04 NOTE — ED PROVIDER NOTE - CLINICAL SUMMARY MEDICAL DECISION MAKING FREE TEXT BOX
89 y/o female with a PMHx of breast CA s/p left mastectomy, dementia, HTN, hypothyroidism, h/o subdural hematoma from [facility] brought in by EMS for evaluation s/p fall. Pt only indicates pain onto right knee at this time. On exam: FROM of all joints, no jamila tenderness elicited, and no external signs of head trauma noted. Will get CT and XRs of painful areas.

## 2022-04-04 NOTE — ED PROVIDER NOTE - NS ED ATTENDING STATEMENT MOD
This was a shared visit with the FLOR. I reviewed and verified the documentation and independently performed the documented:

## 2022-04-04 NOTE — ED PROVIDER NOTE - MUSCULOSKELETAL, MLM
Spine appears normal, range of motion is not limited, no vert step off deformities. no ext rotation or shortening. no obvious ext tenderness on exam or pain with ROM

## 2022-04-04 NOTE — ED PROVIDER NOTE - OBJECTIVE STATEMENT
88 year old female with history of dementia, hypothyroid, HTN, and history of subdural hematoma BIBA for evaluation after fall. Per EMS, slid out of wheelchair and hit head. no known LOC. per transfer records, patient hit head and was complaining of left hip and left leg pain. patient denies any pain or complaints in ED  limited history due to dementia   PCP Angel Wang  Resident at [facility]

## 2022-04-04 NOTE — ED PROVIDER NOTE - PROGRESS NOTE DETAILS
resting comfortably. CT head and cervical spine pending. moving all ext.   Dr. Cruz will assume care

## 2022-04-04 NOTE — ED PROVIDER NOTE - NSICDXPASTMEDICALHX_GEN_ALL_CORE_FT
PAST MEDICAL HISTORY:  Breast CA     Dementia     HTN (hypertension)      PAST MEDICAL HISTORY:  Breast CA     Dementia     History of subdural hematoma     HTN (hypertension)     Hypothyroid

## 2022-04-04 NOTE — ED PROVIDER NOTE - CARE PLAN
Principal Discharge DX:	Head injury  Secondary Diagnosis:	Contusion of left hip  Secondary Diagnosis:	Fall   1

## 2022-04-04 NOTE — ED PROVIDER NOTE - ATTENDING CONTRIBUTION TO CARE
Uri Cruz MD: I have personally performed a face to face diagnostic evaluation on this patient.  I have reviewed the PA note and agree with the history, exam, and plan of care, except as noted.  History and Exam by me shows same findings as documented

## 2022-04-04 NOTE — ED PROVIDER NOTE - NSICDXPASTSURGICALHX_GEN_ALL_CORE_FT
PAST SURGICAL HISTORY:  H/O mastectomy left    History of colon resection     History of hip surgery right     PAST SURGICAL HISTORY:  H/O mastectomy left    History of colon resection     History of hip surgery right

## 2022-04-04 NOTE — ED PROVIDER NOTE - CARE PROVIDERS DIRECT ADDRESSES
,[email or web address]

## 2022-04-04 NOTE — ED ADULT NURSE NOTE - NSICDXPASTMEDICALHX_GEN_ALL_CORE_FT
PAST MEDICAL HISTORY:  Breast CA     Dementia     History of subdural hematoma     HTN (hypertension)     Hypothyroid

## 2022-04-05 PROBLEM — F03.90 UNSPECIFIED DEMENTIA WITHOUT BEHAVIORAL DISTURBANCE: Chronic | Status: ACTIVE | Noted: 2022-01-01

## 2022-05-13 PROBLEM — E03.9 HYPOTHYROIDISM, UNSPECIFIED: Chronic | Status: ACTIVE | Noted: 2022-01-01

## 2022-05-13 PROBLEM — Z86.79 PERSONAL HISTORY OF OTHER DISEASES OF THE CIRCULATORY SYSTEM: Chronic | Status: ACTIVE | Noted: 2022-01-01

## 2022-05-13 NOTE — ED ADULT TRIAGE NOTE - CHIEF COMPLAINT QUOTE
As per EMS crew " She fell out of her wheelchair while in the cafeteria . She have abrasion and contusion right side eye brow area " No LOC noted NO vomiting No blood thinners Pt PMH  Dementia Pt is a poor historian

## 2022-05-13 NOTE — ED ADULT NURSE NOTE - OBJECTIVE STATEMENT
87yo female BIBA,as per ems "she fell out of her wheelchair". pt noted with redness and contusion to right eye. no LOC as per staff.

## 2022-05-13 NOTE — ED PROVIDER NOTE - CPE EDP SKIN NORM
Right foot four views:

There is no fracture or dislocation.

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

There is an orthopedic screw in the neck of the great toe metatarsal.

Mineralization and joint spaces are otherwise unremarkable.  There are no

calcifications.

Impression:

Great toe MTP osteoarthritis.  Great toe metatarsal neck orthopedic screw.

No fracture or dislocation.

 

 

Electronically Signed by

Fahad Correia MD 07/26/2019 11:53 A
normal...

## 2022-05-13 NOTE — ED PROVIDER NOTE - PATIENT PORTAL LINK FT
You can access the FollowMyHealth Patient Portal offered by [facility] by registering at the following website: [email or web address]. By joining [employer]’s FollowMyHealth portal, you will also be able to view your health information using other applications (apps) compatible with our system.

## 2022-05-13 NOTE — ED PROVIDER NOTE - MUSCULOSKELETAL, MLM
Spine appears normal, no spinal tend (c,t,l).  range of motion is not limited, Some pain with R hip with FROM,  no  other muscle or joint tenderness otherwise noted

## 2022-05-13 NOTE — ED ADULT NURSE NOTE - NSIMPLEMENTINTERV_GEN_ALL_ED
Implemented All Fall with Harm Risk Interventions:  [place] to call system. Call bell, personal items and telephone within reach. Instruct patient to call for assistance. Room bathroom lighting operational. Non-slip footwear when patient is off stretcher. Physically safe environment: no spills, clutter or unnecessary equipment. Stretcher in lowest position, wheels locked, appropriate side rails in place. Provide visual cue, wrist band, yellow gown, etc. Monitor gait and stability. Monitor for mental status changes and reorient to person, place, and time. Review medications for side effects contributing to fall risk. Reinforce activity limits and safety measures with patient and family. Provide visual clues: red socks.

## 2022-05-13 NOTE — ED PROVIDER NOTE - OBJECTIVE STATEMENT
87 y/o female with PMHx of breast cancer s/p left mastectomy, dementia, HTN, H/O colon resection, subdural hematoma presents to the ED s/p witnessed fall out of wheelchair today at [facility]. Denies LOC, A/C use. Further hx unobtainable 2/2 dementia. 89 y/o female with PMHx of breast cancer s/p left mastectomy, dementia, HTN, H/O colon resection, subdural hematoma presents to the ED s/p witnessed fall out of wheelchair today at [facility]. No LOC, A/C use. Further hx unobtainable 2/2 dementia.

## 2022-06-03 NOTE — CONSULT NOTE ADULT - ASSESSMENT
88F pmhx DNR/DNI dementia @ nursing home, breast CA s/p mastectomy, prior CVA, wheelchair bound at baseline, no AC/AP, fell yest, p/w L hemiplegia. CTH large R parietal IPH w/ IVH. Awake, EOS, perrl, R gaze, mumbling, no FC, spont on R, L-side no movement  -no neurosurgical intervention is indicated at this time for traumatic intraparenchymal hemorrhage with minimal midline shift  -can obtain 4-6hr CT head for stability if desired, if stable no change in management  -recommend keppra for seizure prophylaxis   -being admitted to palliative

## 2022-06-03 NOTE — ED ADULT NURSE REASSESSMENT NOTE - NS ED NURSE REASSESS COMMENT FT1
covering team contacted to notify about patient's high blood pressure. pt /100, RN was told "we are too busy, please call [phone]", RN called [phone] with no answer. Will try and call again, pt on cardiac monitor, unable to administer order for metoprolol PO because patient cannot swallow pills. Will continue to monitor and assess while offering support and reassurance.

## 2022-06-03 NOTE — ED PROVIDER NOTE - OBJECTIVE STATEMENT
88F, dementia, lives at assisted living, presents to ED with left sided weakness and right sided eye gaze. EMS states staff at NH said patients last known normal was 5 hours prior to ED arrival. Patient has hx of stroke in Oct 2021. No AC. Never had a brain bleed. No fevers. No n/v/d. Patient unable to give history.

## 2022-06-03 NOTE — CHART NOTE - NSCHARTNOTEFT_GEN_A_CORE
[id number]  CHARLIE DE LA ROSA    Notified by RN that patient noted with elevated BP (190/69). Patient seen and assessed at bedside in [facility] section of ED. Patient is awake, not following commands, unable to contribute to ROS.     Briefly, this is a 88F, dementia, lives at assisted living, who presentsed to ED with left sided weakness and right sided eye gaze after a fall. CT head with Large right acute temporal parietal hematoma with intraventricular extension. Hemorrhage seen extending into the bilateral lateral ventricles. Mass effect on the right lateral ventricle with midline shift to the left. -NSGY recs no intervention. -HCP Sunitha Barreraman affirms patient's prior wishes of DNR/DNI and would not like aggressive measures. Focus on comfort.      Patient takes multiple anti-hypertensive medications (Lisinopril, amlodipine, doxazosin) however is unable to follow commands or take oral medications at this time. Hydralazine 5mg IVP x1 ordered; vital signs to be repeated 30 minutes post administration of hydralazine. Will make patient NPO as this time. Palliative evaluation pending for comfort care. Will continue to closely monitor patient/vitals.     Vital Signs Last 24 Hrs  T(C): 36.9 (03 Jun 2022 16:31), Max: 36.9 (03 Jun 2022 16:31)  T(F): 98.5 (03 Jun 2022 16:31), Max: 98.5 (03 Jun 2022 16:31)  HR: 98 (03 Jun 2022 22:00) (70 - 102)  BP: 190/69 (03 Jun 2022 22:00) (130/100 - 198/100)  RR: 16 (03 Jun 2022 22:00) (16 - 20)  SpO2: 100% (03 Jun 2022 22:00) (99% - 100%)    Allan Villatoro PA-C  Dept of Medicine  [id number]

## 2022-06-03 NOTE — H&P ADULT - HISTORY OF PRESENT ILLNESS
88F, dementia, lives at assisted living, presents to ED with left sided weakness and right sided eye gaze. EMS states staff at NH said patients last known normal was 5 hours prior to ED arrival. Patient has hx of stroke in Oct 2021. No AC. Never had a brain bleed. No fevers. No n/v/d. Patient unable to give history.  88F, dementia, lives at assisted living, presents to ED with left sided weakness and right sided eye gaze after a fall. EMS states staff at NH said patients last known normal was 5 hours prior to ED arrival. Patient has hx of stroke in Oct 2021. No AC. Never had a brain bleed. No fevers. No n/v/d. Patient unable to give history.

## 2022-06-03 NOTE — ED ADULT NURSE NOTE - NSIMPLEMENTINTERV_GEN_ALL_ED
Implemented All Fall with Harm Risk Interventions:  [place] to call system. Call bell, personal items and telephone within reach. Instruct patient to call for assistance. Room bathroom lighting operational. Non-slip footwear when patient is off stretcher. Physically safe environment: no spills, clutter or unnecessary equipment. Stretcher in lowest position, wheels locked, appropriate side rails in place. Provide visual cue, wrist band, yellow gown, etc. Monitor gait and stability. Monitor for mental status changes and reorient to person, place, and time. Review medications for side effects contributing to fall risk. Reinforce activity limits and safety measures with patient and family. Provide visual clues: red socks.

## 2022-06-03 NOTE — ED PROVIDER NOTE - CLINICAL SUMMARY MEDICAL DECISION MAKING FREE TEXT BOX
elderly female a/ox1, dnr/dni presents with left hemiplegia and rightward gaze. no speaking. neuro and neurosurg consulted. pt has large brain bleed on ct scan. HCP (Niece) called for goals of care. Patient is not on AC. Neurosurgeon to discuss surgery options with patient.

## 2022-06-03 NOTE — ED ADULT NURSE REASSESSMENT NOTE - NS ED NURSE REASSESS COMMENT FT1
Admitting team placed order for hydralazine, will administer and monitor VS. pt resting comfortably.

## 2022-06-03 NOTE — ED CLERICAL - NS ED CLERK UNITS
Patient is alert and oriented. Patient able to follow commands. Patient trach/vent fiO2 30% with no signs of respiratory distress. Patient on tube feedings 20 mL/hr with minimal residual. Patient on TPN. Blood sugars maintained within prescribed range.  Jacquie Castillo APER mobility daily  - Provide frequent reorientation  - Promote wakefulness i.e. lights on, blinds open  - Promote sleep, encourage patient's normal rest cycle i.e. lights off, TV off, minimize noise and interruptions  - Encourage family to assist in orientati difficulty  - Respiratory Therapy support as indicated  - Manage/alleviate anxiety  - Monitor for signs/symptoms of CO2 retention  Outcome: Progressing     Problem: GASTROINTESTINAL - ADULT  Goal: Maintains or returns to baseline bowel function  [facility] NEUROLOGICAL - ADULT  Goal: Achieves stable or improved neurological status  Description  INTERVENTIONS  - Assess for and report changes in neurological status  - Initiate measures to prevent increased intracranial pressure  - Maintain blood pressure and f or social support system  Outcome: Progressing     Problem: HEMATOLOGIC - ADULT  Goal: Free from bleeding injury  Description  (Example usage: patient with low platelets)  INTERVENTIONS:  - Avoid intramuscular injections, enemas and rectal medication admin

## 2022-06-03 NOTE — CONSULT NOTE ADULT - SUBJECTIVE AND OBJECTIVE BOX
CHARLIE DE LA ROSA  Female  MRN-[id number]    HPI:  88Y old [place] Islander and some english speaking female w/ PMH of dementia, subdural bleed 10/4/21), breast cancer s/p left masectomy, HTN, H/O colon resection  HTN presents to the ED as a code stroke with R side hemiparesis. As per EMS, pt LWK 5 hours prior from arriving, around 9 am. Patient had an unwitnessed fall last night, unknown if there was any head trauma. Pt resides at  [facility]. As per collateral, When the aid saw her last night on the floor, she was neurologically at baseline, A&ox1 with no noted trauma. This morning, around 9 am patient was noted to be leaning to the side, saying "her neck hurts," when MD evaluated her at the [facility] living he insisted she seeks medical attention. As per EMS, pt could not  with the left hand and did not want anyone to touch her. Family is not involved in care. At baseline, she is demented and wheelchair bound.  CT head showed a large right acute temporal parietal hematoma with intraventricular extension. Hemorrhage seen extending into the bilateral  lateral ventricles. Mass effect on the right lateral ventricle with  midline shift to the left.  On exam pt does not follow commands, has a R gaze palsy that cannot be overcome, mild R facial flattening, moderately dysarthric, severe aphasia,  LUE/LLE: no effort against gravity, RUE/RLE  some effort against gravity,  no bTT b/l. Neurosurgery consulted and pt admitted to ICU.     Not a tPA candidate due to outside the window and noted bleed on CT   Not a thrombectomy candidate due to noted bleed on CT     NIHSS: 23   MRS: 4    ROS: unable to assess due to AMS    PAST MEDICAL & SURGICAL HISTORY:  HTN (hypertension)    Breast CA    Dementia    Hypothyroid    History of subdural hematoma    History of colon resection    H/O mastectomy  left    History of hip surgery  right    FAMILY HISTORY    SOCIAL HISTORY    MEDICATIONS  (STANDING):    MEDICATIONS  (PRN):    Allergies    No Known Allergies    Intolerances    Vital Signs Last 24 Hrs  T(C): 36.5 (03 Jun 2022 14:28), Max: 36.5 (03 Jun 2022 14:28)  T(F): 97.7 (03 Jun 2022 14:28), Max: 97.7 (03 Jun 2022 14:28)  HR: 87 (03 Jun 2022 14:28) (87 - 87)  BP: 179/81 (03 Jun 2022 14:28) (179/81 - 179/81)  RR: 16 (03 Jun 2022 14:28) (16 - 16)  SpO2: 99% (03 Jun 2022 14:28) (99% - 99%)      Subjective and Objective:   SUBJECTIVE/INTERVAL HISTORY: No overnight events. ROS negative unless noted above.      PHYSICAL EXAM:     General:  Constitutional: normal weight Female, appears stated age, in no apparent distress including pain  Head: Normocephalic & atraumatic.  Abd: Soft, NT/ND    Neurological (>12):  MS: Awake, alert, oriented to person, place, situation, time. Normal affect. Follows all commands.    Language: Speech is clear, fluent with good repetition & comprehension (able to name objects pen, scrunchy)    CNs: PERRL (R = 3mm, L = 3mm). VFF. EOMI no nystagmus, no diplopia. V1-3 intact to LT/pinprick, well developed masseter muscles b/l. No facial asymmetry b/l, full eye closure strength b/l. Hearing grossly normal (rubbing fingers) b/l. Symmetric palate elevation in midline. Gag reflex deferred. Head turning & shoulder shrug intact b/l. Tongue midline, normal movements, no atrophy.    Motor: Normal muscle bulk & tone. No noticeable tremor. No pronator drift. Moving all extremities spontaneously.               Deltoid	Biceps	Triceps	Finger ABd	   R	5	5	5	5		5 	  L	5	5	5	5		5    	H-Flex	H-Ext	K-Flex	K-Ext	D-Flex	P-Flex  R	5	4+	5	5	5	5		   L	5	4+	5	5	5	5		     Sensation: Decreased sensation to LT/ST in abdomen; now intact in b/l lower extremities. Temperature sensation decreased in lower abdomen. Proprioception intact.      Reflexes:              Biceps(C5)       BR(C6)     Triceps(C7)               Patellar(L4)    Achilles(S1)    Plantar Resp  R	2	          2	             2		        3		    3		Down   L	2	          2	             2		        3		    3		Down     Coordination: intact rapid-alt movements. No dysmetria to FTN/HTS    Gait:           Neuro    LABS:                          9.3    6.73  )-----------( 295      ( 03 Jun 2022 14:53 )             30.7     06-03    141  |  100  |  19  ----------------------------<  111<H>  4.3   |  28  |  0.75    Ca    9.1      03 Jun 2022 14:53    TPro  7.0  /  Alb  3.5  /  TBili  0.5  /  DBili  x   /  AST  24  /  ALT  14  /  AlkPhos  113  06-03    PT/INR - ( 03 Jun 2022 14:53 )   PT: 11.8 sec;   INR: 1.02 ratio         PTT - ( 03 Jun 2022 14:53 )  PTT:32.0 sec    RADIOLOGY:             CHARLIE DE LA ROSA  Female  MRN-[id number]    HPI:  88Y old Ethiopian and some english speaking female w/ PMH of dementia, subdural bleed (10/4/21), breast cancer s/p left masectomy, HTN, H/O colon resection  HTN presents to the ED as a code stroke with R side hemiparesis. As per EMS, pt LWK 5 hours prior from arriving, around 9 am. Patient had an unwitnessed fall last night, unknown if there was any head trauma. Pt resides at  [facility]. As per collateral, when the aid saw her last night on the floor, she was neurologically at baseline, A&ox1 with no noted trauma. This morning, around 9 am patient was noted to be leaning to the side, saying "her neck hurts," when MD evaluated her at the [facility] living he insisted she seeks medical attention. As per EMS, pt could not  with the left hand and did not want anyone to touch her. Family is not involved in care. At baseline, she is demented and wheelchair bound.  CT head showed a large right acute temporal parietal hematoma with intraventricular extension. Hemorrhage seen extending into the bilateral  lateral ventricles. Mass effect on the right lateral ventricle with  midline shift to the left.  On exam pt does not follow commands, has a R gaze palsy that cannot be overcome, mild L facial asymmetry, moderately dysarthric, severe aphasia,  LUE/LLE: no effort against gravity, RUE/RLE  some effort against gravity,  no bTT b/l. Neurosurgery consulted and pt admitted to ICU.     Not a tPA candidate due to outside the window and noted bleed on CT   Not a thrombectomy candidate due to noted bleed on CT     NIHSS: 23   MRS: 4  ICH score: 4    ROS: unable to assess due to AMS    PAST MEDICAL & SURGICAL HISTORY:  HTN (hypertension)    Breast CA    Dementia    Hypothyroid    History of subdural hematoma    History of colon resection    H/O mastectomy  left    History of hip surgery  right    FAMILY HISTORY    SOCIAL HISTORY    MEDICATIONS  (STANDING):    MEDICATIONS  (PRN):    Allergies    No Known Allergies    Intolerances    Vital Signs Last 24 Hrs  T(C): 36.5 (03 Jun 2022 14:28), Max: 36.5 (03 Jun 2022 14:28)  T(F): 97.7 (03 Jun 2022 14:28), Max: 97.7 (03 Jun 2022 14:28)  HR: 87 (03 Jun 2022 14:28) (87 - 87)  BP: 179/81 (03 Jun 2022 14:28) (179/81 - 179/81)  RR: 16 (03 Jun 2022 14:28) (16 - 16)  SpO2: 99% (03 Jun 2022 14:28) (99% - 99%)    PHYSICAL EXAM:     General:  Constitutional: ill appearing female, appears stated age, in no apparent distress including pain  Head: Normocephalic & atraumatic.  Abd: Soft, NT/ND    Neurological (>12):  MS:  Eyes open to examiner,  w/ right gaze preference.  perseverating saying "yes".  Disoriented x4. Cannot follow any commands.     Language: Speech is moderately dysarthric, severe aphasia     CNs: PERRL (R = 2mm, L = 2mm). . unable to assess EOM. Forced R gaze palsy; cannot be overcome,  No BTT, well developed masseter muscles b/l. mild L facial asymmetry b/l, full eye closure strength b/l.  Symmetric palate elevation in midline.    Motor:  Paratonia b/l UE/LE. Decreased muscle bulk UE/LE.  LUE/LLE: no effort against gravity, RUE/RLE: some effort against gravity          		     Sensation: withdraws Right side and LLE to noxious stimuli. Grimaces to noxious stimuli LUE    Reflexes:              Biceps(C5)       BR(C6)     Triceps(C7)               Patellar(L4)    Achilles(S1)    Plantar Resp  R	2	          2	             2		        2		    2		mute  L	2	          2	             2		        2		    2		mute     Coordination: unable to assess     Gait: unable to assess    LABS:  cret                        9.3    6.73  )-----------( 295      ( 03 Jun 2022 14:53 )             30.7     06-03    141  |  100  |  19  ----------------------------<  111<H>  4.3   |  28  |  0.75    Ca    9.1      03 Jun 2022 14:53    TPro  7.0  /  Alb  3.5  /  TBili  0.5  /  DBili  x   /  AST  24  /  ALT  14  /  AlkPhos  113  06-03    PT/INR - ( 03 Jun 2022 14:53 )   PT: 11.8 sec;   INR: 1.02 ratio         PTT - ( 03 Jun 2022 14:53 )  PTT:32.0 sec    RADIOLOGY:      CT Brain Stroke Protocol 06.03.22  FINDINGS:  Neck CTA:    The visualized aorta and great vessels are unremarkable. The common   carotid arteries are normal in appearance.  The internal carotid arteries and external carotid arteries are patent.  The right vertebral artery is patent. The left vertebral artery is not   identified at its origin. It does become visible at the level and is seen   somewhat diminutive but extending in to the intracranial compartment.  Degenerative changes in the cervical region.  Evidence of inferior right thyroid nodule less than a centimeter in size.   Atretic left lobe of the thyroid.  Brain CTA:    The distal internal carotid arteries are patent.  Thecircle of Dozier is normal in appearance.  The visualized cerebral arteries are unremarkable.  The vertebrobasilar junction and basilar artery are normal.    All measurements are performed using standard NASCET criteria.    CT of the head demonstrates an acute right temporal parietal hematoma   with mass effect on the right lateral ventricle and intraventricular   extension. Acute hematoma measures 6 x 4.4 x 7 cm ( APxTRxCC). Hemorrhage   seen in the bilateral lateral ventricles. Midline shift toto the left   roughly 5 mm. Edema surrounds the acute hematoma. Right sphenoid mucosal   thickening. Right maxillary mucosal thickening and ethmoid mucosal   thickening    IMPRESSION:  Large right acute temporal parietal hematoma with   intraventricular extension. Hemorrhage seen extending into the bilateral   lateral ventricles. Mass effect on the right lateral ventricle with   midline shift to the left. No associated vascular abnormality in   association with the hematoma. Left vertebral originis not appreciated.   The left vertebral reconstitutes at approximately the C5 level and is   diminutive in appearance compared with the right side.       CHARLIE DE LA ROSA  Female  MRN-[id number]    HPI:  88Y old [place] Irish and some english speaking female w/ PMH of dementia, subdural bleed (10/4/21), breast cancer s/p left masectomy, HTN, H/O colon resection  HTN presents to the ED as a code stroke with R side hemiparesis. As per EMS, pt LWK 5 hours prior from arriving, around 9 am. Patient had an unwitnessed fall last night, unknown if there was any head trauma. Pt resides at  [facility]. As per collateral, when the aid saw her last night on the floor, she was neurologically at baseline, A&ox1 with no noted trauma. This morning, around 9 am patient was noted to be leaning to the side, saying "her neck hurts," when MD evaluated her at the [facility] living he insisted she seeks medical attention. As per EMS, pt could not  with the left hand and did not want anyone to touch her. Family is not involved in care. At baseline, she is demented and wheelchair bound.  CT head showed a large right acute temporal parietal hematoma with intraventricular extension. Hemorrhage seen extending into the bilateral  lateral ventricles. Mass effect on the right lateral ventricle with  midline shift to the left.  On exam pt does not follow commands, has a R gaze palsy that cannot be overcome, mild L facial asymmetry, moderately dysarthric, severe aphasia,  LUE/LLE: no effort against gravity, RUE/RLE  some effort against gravity,  no bTT b/l, withdraws to noxious stimuli to the right side and LLE. Grimaces to noxious stimuli LUE.  vitals: 179/81, HR 87, RR 16, O2 99%.  Neurosurgery consulted and pt admitted to ICU.     Not a tPA candidate due to outside the window and noted bleed on CT   Not a thrombectomy candidate due to noted bleed on CT     NIHSS: 23   MRS: 4  ICH score: 4    ROS: unable to assess due to AMS    PAST MEDICAL & SURGICAL HISTORY:  HTN (hypertension)    Breast CA    Dementia    Hypothyroid    History of subdural hematoma    History of colon resection    H/O mastectomy  left    History of hip surgery  right    FAMILY HISTORY    SOCIAL HISTORY    MEDICATIONS  (STANDING):    MEDICATIONS  (PRN):    Allergies    No Known Allergies    Intolerances    Vital Signs Last 24 Hrs  T(C): 36.5 (03 Jun 2022 14:28), Max: 36.5 (03 Jun 2022 14:28)  T(F): 97.7 (03 Jun 2022 14:28), Max: 97.7 (03 Jun 2022 14:28)  HR: 87 (03 Jun 2022 14:28) (87 - 87)  BP: 179/81 (03 Jun 2022 14:28) (179/81 - 179/81)  RR: 16 (03 Jun 2022 14:28) (16 - 16)  SpO2: 99% (03 Jun 2022 14:28) (99% - 99%)    PHYSICAL EXAM:     General:  Constitutional: ill appearing female, appears stated age, in no apparent distress including pain  Head: Normocephalic & atraumatic.  Abd: Soft, NT/ND    Neurological (>12):  MS:  Eyes open to examiner,  w/ right gaze preference.  perseverating saying "yes".  Disoriented x4. Cannot follow any commands.     Language: Speech is moderately dysarthric, severe aphasia     CNs: PERRL (R = 2mm, L = 2mm). . unable to assess EOM. Forced R gaze palsy; cannot be overcome,  No BTT, well developed masseter muscles b/l. mild L facial asymmetry b/l, full eye closure strength b/l.  Symmetric palate elevation in midline.    Motor:  Paratonia b/l UE/LE. Decreased muscle bulk UE/LE.  LUE/LLE: no effort against gravity, RUE/RLE: some effort against gravity          		     Sensation: withdraws to noxious stimuli to the right side and LLE. Grimaces to noxious stimuli LUE    Reflexes:              Biceps(C5)       BR(C6)     Triceps(C7)               Patellar(L4)    Achilles(S1)    Plantar Resp  R	2	          2	             2		        2		    2		mute  L	2	          2	             2		        2		    2		mute     Coordination: unable to assess     Gait: unable to assess    LABS:  cret                        9.3    6.73  )-----------( 295      ( 03 Jun 2022 14:53 )             30.7     06-03    141  |  100  |  19  ----------------------------<  111<H>  4.3   |  28  |  0.75    Ca    9.1      03 Jun 2022 14:53    TPro  7.0  /  Alb  3.5  /  TBili  0.5  /  DBili  x   /  AST  24  /  ALT  14  /  AlkPhos  113  06-03    PT/INR - ( 03 Jun 2022 14:53 )   PT: 11.8 sec;   INR: 1.02 ratio         PTT - ( 03 Jun 2022 14:53 )  PTT:32.0 sec    RADIOLOGY:      CT Brain Stroke Protocol 06.03.22  FINDINGS:  Neck CTA:    The visualized aorta and great vessels are unremarkable. The common   carotid arteries are normal in appearance.  The internal carotid arteries and external carotid arteries are patent.  The right vertebral artery is patent. The left vertebral artery is not   identified at its origin. It does become visible at the level and is seen   somewhat diminutive but extending in to the intracranial compartment.  Degenerative changes in the cervical region.  Evidence of inferior right thyroid nodule less than a centimeter in size.   Atretic left lobe of the thyroid.  Brain CTA:    The distal internal carotid arteries are patent.  Thecircle of Dozier is normal in appearance.  The visualized cerebral arteries are unremarkable.  The vertebrobasilar junction and basilar artery are normal.    All measurements are performed using standard NASCET criteria.    CT of the head demonstrates an acute right temporal parietal hematoma   with mass effect on the right lateral ventricle and intraventricular   extension. Acute hematoma measures 6 x 4.4 x 7 cm ( APxTRxCC). Hemorrhage   seen in the bilateral lateral ventricles. Midline shift toto the left   roughly 5 mm. Edema surrounds the acute hematoma. Right sphenoid mucosal   thickening. Right maxillary mucosal thickening and ethmoid mucosal   thickening    IMPRESSION:  Large right acute temporal parietal hematoma with   intraventricular extension. Hemorrhage seen extending into the bilateral   lateral ventricles. Mass effect on the right lateral ventricle with   midline shift to the left. No associated vascular abnormality in   association with the hematoma. Left vertebral originis not appreciated.   The left vertebral reconstitutes at approximately the C5 level and is   diminutive in appearance compared with the right side.

## 2022-06-03 NOTE — H&P ADULT - NSHPADDITIONALINFOADULT_GEN_ALL_CORE
Luis Antonio Boykin MD  Division of [facility] Medicine  Pager: [phone]  Office: [phone]

## 2022-06-03 NOTE — H&P ADULT - PROBLEM SELECTOR PLAN 1
-Presents to ED with left sided weakness and right sided eye gaze after a fall. CT head with Large right acute temporal parietal hematoma with intraventricular extension. Hemorrhage seen extending into the bilateral lateral ventricles. Mass effect on the right lateral ventricle with midline shift to the left. -NSGY recs no intervention.   -HCP Sunithamagdaleno Mitchell ([phone]) affirms patient's prior wishes of DNR/DNI and would not like aggressive measures. Focus on comfort.   -Palliative care eval to see if patient can go to PCU for further mgmt there.   -In the meantime, dilaudid for pain prn, ativan for anxiety/distress prn. comfort measures.   -Keppra for seizure ppx. repeat ct head was ordered for 8pm.   -C/w home depakote and seroquel for h/o dementia.

## 2022-06-03 NOTE — ED ADULT NURSE NOTE - OBJECTIVE STATEMENT
Patient s/p fall yesterday. Pt was found with left sided weakness at 9am. pt with left sided weakness and left preferential gaze. Pt wasn't moving her left arm upon arrival in the [facility] area. Pt with hx: subdural hematoma Patient s/p fall yesterday. Pt was found with left sided weakness at 9am. pt with left sided weakness and left preferential gaze. Pt wasn't moving her left arm upon arrival in the [facility] area. Pt with hx: subdural hematoma, Code stroke called and pt was sent to [facility].

## 2022-06-03 NOTE — ED PROVIDER NOTE - PROGRESS NOTE DETAILS
Endorsed to Dr Vivian Parikh MD, Facep neuro at bedside. ct scan show brain bleed into prev stroke. confirmed pt not on AC. Neurosurg at bed side evaluating the patient. I spoke to Niece who is the HCP. She confirmed pt is a DNR/DNI. She states pt would want some intervenitons if it coud improve her function or condition. Patient wheelchair bound and a/ox1 at baseline. She speaks some but is confused. NH staff has noted to HCP that she has been looking weaker lately. neuro at bedside. ct scan show brain bleed into prev stroke. confirmed pt not on AC. Neurosurg at bed side evaluating the patient. I spoke to Niece who is the HCP. She confirmed pt is a DNR/DNI. She states pt would want some interventions if it coud improve her function or condition. Patient wheelchair bound and a/ox1 at baseline. She speaks some but is confused. NH staff has noted to HCP that she has been looking weaker lately. neurosurgeon recc 4 hour  interval scan, but despite results no surgical intervention either way. recc palliative admission. neurosurgeon agrees to discuss this with HCP (Wing Burnett added for sz prophylaxis.

## 2022-06-03 NOTE — H&P ADULT - ASSESSMENT
88F, dementia, lives at assisted living, presents to ED with left sided weakness and right sided eye gaze after a fall. CT head with Large right acute temporal parietal hematoma with intraventricular extension. Hemorrhage seen extending into the bilateral lateral ventricles. Mass effect on the right lateral ventricle with midline shift to the left. -NSGY recs no intervention. -HCP Sunitha Mitchell affirms patient's prior wishes of DNR/DNI and would not like aggressive measures. Focus on comfort.

## 2022-06-03 NOTE — CONSULT NOTE ADULT - ASSESSMENT
88Y old Armenian and some english speaking female w/ PMH of dementia, subdural bleed (10/4/21), breast cancer s/p left masectomy, HTN, H/O colon resection  HTN presents to the ED as a code stroke with R side hemiparesis. S/P unwitnessed fall last night.  As per EMS, pt LWK 5 hours prior from arriving, around 9 am.  CT head showed a large right acute temporal parietal hematoma with intraventricular extension. Hemorrhage seen extending into the bilateral  lateral ventricles. Mass effect on the right lateral ventricle with  midline shift to the left.  On exam, pt does not follow commands, has a R gaze palsy that cannot be overcome, mild R facial asymmetry, moderately dysarthric, severe aphasia,  LUE/LLE: no effort against gravity, RUE/RLE  some effort against gravity,  no bTT b/l.  Neurosurgery consulted and pt admitted to ICU.     Not a tPA candidate due to outside the window and noted bleed on CT   Not a thrombectomy candidate due to noted bleed on CT     NIHSS: 23   MRS: 4  ICH: 4    Impression: left sided hemiparesis w/ R gaze palsy, moderately dysarthric and severe aphasia in the setting of a large intracerebral hemorrhagic stroke as seen on CT    Plan:  [] BP < 160/90  [] MRI brain w/wo contrast to look for underlying lesion, malformation  [] MRA brain w/o contrast and MRA neck with contrast  [] CTH 4hr, and 24hr  [] If neurological examination deteriorates, obtain repeat head CT scan  [] CBC, PT/PTT, Fibrinogen, CMP, type and screen, Utox  [] If signs of increased intracranial pressure or herniation (stupor/coma, nausea, vomiting, acute hypertension with bradycardia, unilateral dilated pupil), hypertonic saline (23.4%) administration  [] Keep T< 38.0° C (100.1° F), may use acetaminophen and/or cooling blanket  [] BG  ml/dL  [] Target serum sodium 140-150mEq/L, may use hypertonic saline (2% or 3%)  [] Elevate head of bed to 30º  [] Mechanical DVT ppx  [] PT/OT  [] Speech and swallow evaluation    Case to be seen by Dr. Maldonado Dorsey

## 2022-06-03 NOTE — CONSULT NOTE ADULT - SUBJECTIVE AND OBJECTIVE BOX
p ([id number])     HPI: 88F DNR/DNI per niece with a past medical history of dementia at her nursing home, breast cancer s/p mastectomy, prior CVA, wheelchair bound at baseline, not on AC/AP, fell yesterday and presents today with left-sided hemiplegia.     Imaging: CT head shows large right sided parietal intaparenchymal hemorrhage with IVH.     Exam: Awake, EOS, perrl, R gaze, mumbling, no FC, spont on R, L-side no movement    --Anticoagulation:    =====================  PAST MEDICAL HISTORY   HTN (hypertension)    Breast CA    Dementia    Hypothyroid    History of subdural hematoma      PAST SURGICAL HISTORY   History of colon resection    H/O mastectomy    No significant past surgical history    History of hip surgery    History of hip surgery          MEDICATIONS:  Antibiotics:    Neuro:    Other:      SOCIAL HISTORY:   Occupation:   Marital Status:     FAMILY HISTORY:  No pertinent family history in first degree relatives        ROS: Negative except per HPI    LABS:  PT/INR - ( 03 Jun 2022 14:53 )   PT: 11.8 sec;   INR: 1.02 ratio         PTT - ( 03 Jun 2022 14:53 )  PTT:32.0 sec                        9.3    6.73  )-----------( 295      ( 03 Jun 2022 14:53 )             30.7     06-03    141  |  100  |  19  ----------------------------<  111<H>  4.3   |  28  |  0.75    Ca    9.1      03 Jun 2022 14:53    TPro  7.0  /  Alb  3.5  /  TBili  0.5  /  DBili  x   /  AST  24  /  ALT  14  /  AlkPhos  113  06-03

## 2022-06-03 NOTE — H&P ADULT - PROBLEM SELECTOR PLAN 2
-C/w home lisinopril, amlodipine, doxazosin for htn for now.   -Goal BP < 160/90, per neuro recs.   -If unable to take PO, can convert to IV meds.

## 2022-06-03 NOTE — ED PROVIDER NOTE - CONVERSATION DETAILS
I updated HCP on status (hemiplegia and rightward gaze, awake but not speaking) plus large bleed on CT scan. Neurosurg to discuss specifics of surgical intervention possibility with HCP. HCP confirmed DNR/DNI status.

## 2022-06-03 NOTE — H&P ADULT - PROBLEM SELECTOR PLAN 4
-No DVT PPx in setting of bleed and comfort measures.     5. Discussed with HCP Sunitha Mitchell ([phone]) (form from [facility] in chart) affirms patient's prior wishes of DNR/DNI and would not like aggressive measures. Focus on comfort.   -Discussed plan with ACP Jorge.

## 2022-06-03 NOTE — H&P ADULT - NSHPPHYSICALEXAM_GEN_ALL_CORE
PHYSICAL EXAM:  Vital Signs Last 24 Hrs  T(C): 36.9 (06-03-22 @ 16:31)  T(F): 98.5 (06-03-22 @ 16:31), Max: 98.5 (06-03-22 @ 16:31)  HR: 70 (06-03-22 @ 16:31) (70 - 87)  BP: 130/100 (06-03-22 @ 16:31)  BP(mean): --  RR: 18 (06-03-22 @ 16:31) (16 - 18)  SpO2: 99% (06-03-22 @ 16:31) (99% - 99%)  Wt(kg): --    Constitutional: thin  EYES: right eye gaze preference  ENT:  dry mm  Neck: Soft and supple, No JVD  Respiratory: Breath sounds are clear bilaterally, No wheezing, rales or rhonchi  Cardiovascular: S1 and S2, regular rate and rhythm, no Murmurs, gallops or rubs  Gastrointestinal: Bowel Sounds present, soft, nontender, nondistended, no guarding, no rebound  Extremities: No cyanosis or clubbing or edema; warm to touch  Vascular: 2+ peripheral pulses lower ex  Neurological: unable to answer questions. moaning in Kosovan. Left sided weakness/plegia.   Skin: No rashes  Psych: No depression or anhedonia  Heme: No bruises, no nose bleeds

## 2022-06-03 NOTE — H&P ADULT - NSHPLABSRESULTS_GEN_ALL_CORE
LABS:                        9.3    6.73  )-----------( 295      ( 03 Jun 2022 14:53 )             30.7       06-03    141  |  100  |  19  ----------------------------<  111<H>  4.3   |  28  |  0.75    Ca    9.1      03 Jun 2022 14:53    TPro  7.0  /  Alb  3.5  /  TBili  0.5  /  DBili  x   /  AST  24  /  ALT  14  /  AlkPhos  113  06-03          CAPILLARY BLOOD GLUCOSE      POCT Blood Glucose.: 124 mg/dL (03 Jun 2022 14:44)      PT/INR - ( 03 Jun 2022 14:53 )   PT: 11.8 sec;   INR: 1.02 ratio         PTT - ( 03 Jun 2022 14:53 )  PTT:32.0 sec    Lactate Trend      RADIOLOGY:    < from: CT Angio Neck w/ IV Cont (06.03.22 @ 15:09) >    IMPRESSION:  Large right acute temporal parietal hematoma with   intraventricular extension. Hemorrhage seen extending into the bilateral   lateral ventricles. Mass effect on the right lateral ventricle with   midline shift to the left. No associated vascular abnormality in   association with the hematoma. Left vertebral originis not appreciated.   The left vertebral reconstitutes at approximately the C5 level and is   diminutive in appearance compared with the right side.    < end of copied text >

## 2022-06-03 NOTE — ED PROVIDER NOTE - ATTENDING CONTRIBUTION TO CARE
Private Physician   88y female pmh Dementia, SDH, Breast CA. Sp mastectomy, HTN, Hypothyroidism, Resident of assisted living. Pt had fall 5/14 seen in ED and DC home. Now returns w hx from EMS pt was at home/assisted living and had fall yesterday evening. Today at 9am found by staff w left hemiplegia, ems called this afternoon. Hx limited Private Physician   88y female pmh Dementia, SDH, Breast CA. Sp mastectomy, HTN, Hypothyroidism, Resident of assisted living. Pt had fall 5/14 seen in ED and DC home. Now returns w hx from EMS pt was at home/assisted living and had fall yesterday evening. Today at 9am found by staff w left hemiplegia, ems called this afternoon. Hx limited. PE Elderly female looking acutely ill. Chest clear anterior & posterior cv no rubs, gallops or murmurs abd soft +bs no mass guarding neruo gcs eye open speech garbled Persian LUE hemiplegia,, Rt gaze preference. lue power 2/5, rle 5/5, rue5/5 lue 0/5  Nino Parikh MD, Facep Private Physician   88y female pmh Dementia, SDH, Breast CA. Sp mastectomy, HTN, Hypothyroidism, Resident of assisted living. Pt had fall 5/14 seen in ED and DC home. Now returns w hx from EMS pt was at home/assisted living and had fall yesterday evening. Today at 9am found by staff w left hemiplegia, ems called this afternoon. Hx limited. PE Elderly female looking acutely ill. Chest clear anterior & posterior cv no rubs, gallops or murmurs abd soft +bs no mass guarding neruo gcs eye open speech garbled [place] LUE hemiplegia,, Rt gaze preference. lue power 2/5, rle 5/5, rue5/5 lue 0/5. able to follow one step commands. (Stick out tongue)  Nino Parikh MD, Facep

## 2022-06-04 NOTE — CONSULT NOTE ADULT - SUBJECTIVE AND OBJECTIVE BOX
HPI:  88F, dementia, lives at assisted living, presents to ED with left sided weakness and right sided eye gaze after a fall. EMS states staff at NH said patients last known normal was 5 hours prior to ED arrival. Patient has hx of stroke in Oct 2021. No AC. Never had a brain bleed. No fevers. No n/v/d. Patient unable to give history. (03 Jun 2022 18:22)    CT head demonstrated Large right acute temporal parietal hematoma with intraventricular extension. Hemorrhage seen extending into the bilateral lateral ventricles. Mass effect on the right lateral ventricle with midline shift to the left.    As per neurosurgery- no intervention.   NOK and HCP as per chart Sunitha Mitchell  affirms patient's prior wishes of DNR/DNI and would not like aggressive measures. Focus on comfort.      Palliative consulted for GOC and EOL sx management       PERTINENT PM/SXH:   HTN (hypertension)    Breast CA    Essential hypertension    Breast cancer    Dementia    Hypothyroid    History of subdural hematoma      History of colon resection    H/O mastectomy    No significant past surgical history    History of hip surgery    History of hip surgery      FAMILY HISTORY:  No pertinent family history in first degree relatives      ITEMS NOT CHECKED ARE NOT PRESENT    SOCIAL HISTORY:   Significant other/partner:  [x ]  Children:  [ ]  Quaker/Spirituality:  Substance hx:  [ ]   Tobacco hx:  [ ]   Alcohol hx: [ ]   Home Opioid hx:  [ ] I-Stop Reference No:  Living Situation: [ ]Home  [x ]Long term care  [ ]Rehab [ ]Other    ADVANCE DIRECTIVES:    DNR  MOLST  [ ]  Living Will  [ ]   DECISION MAKER(s):  [ ] Health Care Proxy(s)  [x ] Surrogate(s) Sunitha  [ ] Guardian           Name(s): Phone Number(s):    BASELINE (I)ADL(s) (prior to admission):  [place]: [ ]Total  [ ] Moderate [ x]Dependent    Allergies    No Known Allergies    Intolerances    MEDICATIONS  (STANDING):  levETIRAcetam  IVPB 1000 milliGRAM(s) IV Intermittent every 12 hours  levothyroxine Injectable 25 MICROGram(s) IV Push at bedtime  valproate sodium  IVPB 500 milliGRAM(s) IV Intermittent two times a day    MEDICATIONS  (PRN):  HYDROmorphone  Injectable 0.5 milliGRAM(s) IV Push every 4 hours PRN Severe Pain (7 - 10)  LORazepam   Injectable 0.5 milliGRAM(s) IV Push every 6 hours PRN anxiety/agitation/distress  ondansetron Injectable 4 milliGRAM(s) IV Push every 6 hours PRN Nausea and/or Vomiting    PRESENT SYMPTOMS: [x ]Unable to obtain due to poor mentation   Source if other than patient:  [ ]Family   [ ]Team     Pain (Impact on QOL):    Location -         Minimal acceptable level (0-10 scale):                    Aggravating factors -  Quality -  Radiation -  Severity (0-10 scale) -    Timing -    PAIN AD Score:     [email or web address] (press ctrl +  left click to view)    Dyspnea:                           [ ]Mild [ ]Moderate [ ]Severe  Anxiety:                             [ ]Mild [ ]Moderate [ ]Severe  Fatigue:                             [ ]Mild [ ]Moderate [ ]Severe  Nausea:                             [ ]Mild [ ]Moderate [ ]Severe  Loss of appetite:              [ ]Mild [ ]Moderate [ ]Severe  Constipation:                    [ ]Mild [ ]Moderate [ ]Severe    Other Symptoms:  [ ]All other review of systems negative     Palliative Performance Status Version 2:       10  %      PHYSICAL EXAM:  Vital Signs Last 24 Hrs  T(C): 37.6 (04 Jun 2022 08:57), Max: 37.6 (04 Jun 2022 08:57)  T(F): 99.6 (04 Jun 2022 08:57), Max: 99.6 (04 Jun 2022 08:57)  HR: 93 (04 Jun 2022 08:57) (70 - 112)  BP: 160/74 (04 Jun 2022 08:57) (130/100 - 198/100)  BP(mean): --  RR: 18 (04 Jun 2022 08:57) (16 - 20)  SpO2: 98% (04 Jun 2022 08:57) (96% - 100%) I&O's Summary    03 Jun 2022 07:01  -  04 Jun 2022 07:00  --------------------------------------------------------  IN: 150 mL / OUT: 0 mL / NET: 150 mL    04 Jun 2022 07:01  -  04 Jun 2022 12:11  --------------------------------------------------------  IN: 0 mL / OUT: 0 mL / NET: 0 mL    GENERAL:  [ ]Alert  [ ]Oriented x   [ ]Lethargic  [ ]Cachexia  [ x]Unarousable  [ ]Verbal  [x ]Non-Verbal  Behavioral:   [ ] Anxiety  [ ] Delirium [ ] Agitation [ ] Other  HEENT:  [ ]Normal   [ ]Dry mouth   [ ]ET Tube/Trach  [ ]Oral lesions  PULMONARY:   [x ]Clear [ ]Tachypnea  [ ]Audible excessive secretions   [ ]Rhonchi        [ ]Right [ ]Left [ ]Bilateral  [ ]Crackles        [ ]Right [ ]Left [ ]Bilateral  [ ]Wheezing     [ ]Right [ ]Left [ ]Bilateral  CARDIOVASCULAR:    [x ]Regular [ ]Irregular [ ]Tachy  [ ]Praveen [ ]Murmur [ ]Other  GASTROINTESTINAL:  [x ]Soft  [ ]Distended   [ ]+BS  [ ]Non tender [ ]Tender  [ ]PEG [ ]OGT/ NGT  Last BM: GENITOURINARY:  [ ]Normal [ ] Incontinent   [ ]Oliguria/Anuria   [ ]Roy  MUSCULOSKELETAL:   [ ]Normal   [ ]Weakness  [x ]Bed/Wheelchair bound [ ]Edema  NEUROLOGIC:   [ ]No focal deficits  [x ] Cognitive impairment  [ ] Dysphagia [ ]Dysarthria [ ] Paresis [x ]Other   SKIN:   [ ]Normal   [ ]Pressure ulcer(s)  [ ]Rash    CRITICAL CARE:  [ ] Shock Present  [ ]Septic [ ]Cardiogenic [ ]Neurologic [ ]Hypovolemic  [ ]  Vasopressors [ ]  Inotropes   [ ] Respiratory failure present  [ ] Acute  [ ] Chronic [ ] Hypoxic  [ ] Hypercarbic [ ] Other  [ ] Other organ failure     LABS:                        9.3    6.73  )-----------( 295      ( 03 Jun 2022 14:53 )             30.7   06-03    141  |  100  |  19  ----------------------------<  111<H>  4.3   |  28  |  0.75    Ca    9.1      03 Jun 2022 14:53    TPro  7.0  /  Alb  3.5  /  TBili  0.5  /  DBili  x   /  AST  24  /  ALT  14  /  AlkPhos  113  06-03  PT/INR - ( 03 Jun 2022 14:53 )   PT: 11.8 sec;   INR: 1.02 ratio         PTT - ( 03 Jun 2022 14:53 )  PTT:32.0 sec      RADIOLOGY & ADDITIONAL STUDIES:  CTH  IMPRESSION:  Large right temporoparietal parenchymal hemorrhage with associated   ventricular extension, unchanged from earlier head CT at 3:01 PM.      PROTEIN CALORIE MALNUTRITION PRESENT: [ ] Yes [ ] No  [ x] PPSV2 < or = to 30% [ ] significant weight loss  [ ] poor nutritional intake [ ] catabolic state [ ] anasarca     Artificial Nutrition [ ]     REFERRALS:   [ ]Chaplaincy  [ ] Hospice  [ ]Child Life  [ ]Social Work  [ ]Case management [ ]Holistic Therapy   Goals of Care Discussion Document:

## 2022-06-04 NOTE — CONSULT NOTE ADULT - ASSESSMENT
Transfer to [facility]  88F, dementia, lives at assisted living, presents to ED with left sided weakness and right sided eye gaze after a fall. EMS states staff at NH said patients last known normal was 5 hours prior to ED arrival. Patient has hx of stroke in Oct 2021. No AC. Never had a brain bleed. No fevers. No n/v/d. Patient unable to give history. (03 Jun 2022 18:22)    CT head demonstrated Large right acute temporal parietal hematoma with intraventricular extension. Hemorrhage seen extending into the bilateral lateral ventricles. Mass effect on the right lateral ventricle with midline shift to the left.

## 2022-06-04 NOTE — PROGRESS NOTE ADULT - SUBJECTIVE AND OBJECTIVE BOX
Patient is a 89y old  Female who presents with a chief complaint of S/p fall (04 Jun 2022 09:51)      INTERVAL HPI/OVERNIGHT EVENTS: not doing well , ICH , nonverbal   T(C): 37.1 (06-04-22 @ 20:10), Max: 38.1 (06-04-22 @ 12:41)  HR: 95 (06-04-22 @ 14:30) (93 - 112)  BP: 155/67 (06-04-22 @ 14:30) (148/83 - 181/77)  RR: 18 (06-04-22 @ 20:10) (18 - 18)  SpO2: 98% (06-04-22 @ 20:10) (95% - 98%)  Wt(kg): --  I&O's Summary    03 Jun 2022 07:01  -  04 Jun 2022 07:00  --------------------------------------------------------  IN: 150 mL / OUT: 0 mL / NET: 150 mL    04 Jun 2022 07:01  -  04 Jun 2022 23:40  --------------------------------------------------------  IN: 0 mL / OUT: 0 mL / NET: 0 mL        PAST MEDICAL & SURGICAL HISTORY:  HTN (hypertension)      Breast CA      Dementia      Hypothyroid      History of subdural hematoma      History of colon resection      H/O mastectomy  left      History of hip surgery  right          SOCIAL HISTORY  Alcohol:  Tobacco:  Illicit substance use:    FAMILY HISTORY:    REVIEW OF SYSTEMS:  CONSTITUTIONAL: No fever, weight loss, or fatigue  EYES: No eye pain, visual disturbances, or discharge  ENMT:  No difficulty hearing, tinnitus, vertigo; No sinus or throat pain  NECK: No pain or stiffness  RESPIRATORY: No cough, wheezing, chills or hemoptysis; No shortness of breath  CARDIOVASCULAR: No chest pain, palpitations, dizziness, or leg swelling  GASTROINTESTINAL: No abdominal or epigastric pain. No nausea, vomiting, or hematemesis; No diarrhea or constipation. No melena or hematochezia.  GENITOURINARY: No dysuria, frequency, hematuria, or incontinence  NEUROLOGICAL: No headaches, memory loss, loss of strength, numbness, or tremors  SKIN: No itching, burning, rashes, or lesions   LYMPH NODES: No enlarged glands  ENDOCRINE: No heat or cold intolerance; No hair loss  MUSCULOSKELETAL: No joint pain or swelling; No muscle, back, or extremity pain  PSYCHIATRIC: No depression, anxiety, mood swings, or difficulty sleeping  HEME/LYMPH: No easy bruising, or bleeding gums  ALLERY AND IMMUNOLOGIC: No hives or eczema    RADIOLOGY & ADDITIONAL TESTS:    Imaging Personally Reviewed:  [ ] YES  [ ] NO    Consultant(s) Notes Reviewed:  [ ] YES  [ ] NO    PHYSICAL EXAM:  GENERAL: NAD, well-groomed, well-developed  HEAD:  Atraumatic, Normocephalic  EYES: EOMI, PERRLA, conjunctiva and sclera clear  ENMT: No tonsillar erythema, exudates, or enlargement; Moist mucous membranes, Good dentition, No lesions  NECK: Supple, No JVD, Normal thyroid  NERVOUS SYSTEM:  Alert & Oriented X3, Good concentration; Motor Strength 5/5 B/L upper and lower extremities; DTRs 2+ intact and symmetric  CHEST/LUNG: Clear to percussion bilaterally; No rales, rhonchi, wheezing, or rubs  HEART: Regular rate and rhythm; No murmurs, rubs, or gallops  ABDOMEN: Soft, Nontender, Nondistended; Bowel sounds present  EXTREMITIES:  2+ Peripheral Pulses, No clubbing, cyanosis, or edema  LYMPH: No lymphadenopathy noted  SKIN: No rashes or lesions    LABS:                        9.3    6.73  )-----------( 295      ( 03 Jun 2022 14:53 )             30.7     06-03    141  |  100  |  19  ----------------------------<  111<H>  4.3   |  28  |  0.75    Ca    9.1      03 Jun 2022 14:53    TPro  7.0  /  Alb  3.5  /  TBili  0.5  /  DBili  x   /  AST  24  /  ALT  14  /  AlkPhos  113  06-03    PT/INR - ( 03 Jun 2022 14:53 )   PT: 11.8 sec;   INR: 1.02 ratio         PTT - ( 03 Jun 2022 14:53 )  PTT:32.0 sec    CAPILLARY BLOOD GLUCOSE                MEDICATIONS  (STANDING):  levETIRAcetam  IVPB 1000 milliGRAM(s) IV Intermittent every 12 hours  levothyroxine Injectable 25 MICROGram(s) IV Push at bedtime  valproate sodium  IVPB 500 milliGRAM(s) IV Intermittent two times a day    MEDICATIONS  (PRN):  acetaminophen  Suppository .. 650 milliGRAM(s) Rectal every 6 hours PRN Temp greater or equal to 38C (100.4F)  glycopyrrolate Injectable 0.4 milliGRAM(s) IV Push every 8 hours PRN heavy secretions  HYDROmorphone  Injectable 0.5 milliGRAM(s) IV Push every 1 hour PRN Severe Pain (7 - 10)  HYDROmorphone  Injectable 0.5 milliGRAM(s) IV Push every 1 hour PRN dyspnea  LORazepam   Injectable 0.5 milliGRAM(s) IV Push every 6 hours PRN anxiety/agitation/distress  ondansetron Injectable 4 milliGRAM(s) IV Push every 6 hours PRN Nausea and/or Vomiting      Care Discussed with Consultants/Other Providers [ ] YES  [ ] NO

## 2022-06-04 NOTE — PATIENT PROFILE ADULT - FALL HARM RISK - HARM RISK INTERVENTIONS

## 2022-06-05 NOTE — PROGRESS NOTE ADULT - SUBJECTIVE AND OBJECTIVE BOX
Patient is a 89y old  Female who presents with a chief complaint of S/p fall (05 Jun 2022 11:12)      INTERVAL HPI/OVERNIGHT EVENTS:  T(C): 38.3 (06-05-22 @ 17:15), Max: 38.3 (06-05-22 @ 17:15)  HR: 88 (06-05-22 @ 08:27) (88 - 88)  BP: 182/71 (06-05-22 @ 08:27) (182/71 - 182/71)  RR: 18 (06-05-22 @ 08:27) (18 - 18)  SpO2: 99% (06-05-22 @ 08:27) (99% - 99%)  Wt(kg): --  I&O's Summary    04 Jun 2022 07:01  -  05 Jun 2022 07:00  --------------------------------------------------------  IN: 0 mL / OUT: 0 mL / NET: 0 mL        PAST MEDICAL & SURGICAL HISTORY:  HTN (hypertension)      Breast CA      Dementia      Hypothyroid      History of subdural hematoma      History of colon resection      H/O mastectomy  left      History of hip surgery  right          SOCIAL HISTORY  Alcohol:  Tobacco:  Illicit substance use:    FAMILY HISTORY:    REVIEW OF SYSTEMS:  CONSTITUTIONAL: No fever, weight loss, or fatigue  EYES: No eye pain, visual disturbances, or discharge  ENMT:  No difficulty hearing, tinnitus, vertigo; No sinus or throat pain  NECK: No pain or stiffness  RESPIRATORY: No cough, wheezing, chills or hemoptysis; No shortness of breath  CARDIOVASCULAR: No chest pain, palpitations, dizziness, or leg swelling  GASTROINTESTINAL: No abdominal or epigastric pain. No nausea, vomiting, or hematemesis; No diarrhea or constipation. No melena or hematochezia.  GENITOURINARY: No dysuria, frequency, hematuria, or incontinence  NEUROLOGICAL: No headaches, memory loss, loss of strength, numbness, or tremors  SKIN: No itching, burning, rashes, or lesions   LYMPH NODES: No enlarged glands  ENDOCRINE: No heat or cold intolerance; No hair loss  MUSCULOSKELETAL: No joint pain or swelling; No muscle, back, or extremity pain  PSYCHIATRIC: No depression, anxiety, mood swings, or difficulty sleeping  HEME/LYMPH: No easy bruising, or bleeding gums  ALLERY AND IMMUNOLOGIC: No hives or eczema    RADIOLOGY & ADDITIONAL TESTS:    Imaging Personally Reviewed:  [ ] YES  [ ] NO    Consultant(s) Notes Reviewed:  [ ] YES  [ ] NO    PHYSICAL EXAM:  GENERAL: NAD, well-groomed, well-developed  HEAD:  Atraumatic, Normocephalic  EYES: EOMI, PERRLA, conjunctiva and sclera clear  ENMT: No tonsillar erythema, exudates, or enlargement; Moist mucous membranes, Good dentition, No lesions  NECK: Supple, No JVD, Normal thyroid  NERVOUS SYSTEM:  Alert & Oriented X3, Good concentration; Motor Strength 5/5 B/L upper and lower extremities; DTRs 2+ intact and symmetric  CHEST/LUNG: Clear to percussion bilaterally; No rales, rhonchi, wheezing, or rubs  HEART: Regular rate and rhythm; No murmurs, rubs, or gallops  ABDOMEN: Soft, Nontender, Nondistended; Bowel sounds present  EXTREMITIES:  2+ Peripheral Pulses, No clubbing, cyanosis, or edema  LYMPH: No lymphadenopathy noted  SKIN: No rashes or lesions    LABS:              CAPILLARY BLOOD GLUCOSE                MEDICATIONS  (STANDING):  levETIRAcetam  IVPB 1000 milliGRAM(s) IV Intermittent every 12 hours  levothyroxine Injectable 25 MICROGram(s) IV Push at bedtime  valproate sodium  IVPB 500 milliGRAM(s) IV Intermittent two times a day    MEDICATIONS  (PRN):  acetaminophen  Suppository .. 650 milliGRAM(s) Rectal every 6 hours PRN Temp greater or equal to 38C (100.4F)  bisacodyl Suppository 10 milliGRAM(s) Rectal daily PRN Constipation  glycopyrrolate Injectable 0.4 milliGRAM(s) IV Push every 8 hours PRN heavy secretions  hydrALAZINE Injectable 10 milliGRAM(s) IV Push every 4 hours PRN SBP > 160  HYDROmorphone  Injectable 0.5 milliGRAM(s) IV Push every 1 hour PRN Severe Pain (7 - 10)  HYDROmorphone  Injectable 0.5 milliGRAM(s) IV Push every 1 hour PRN dyspnea  LORazepam   Injectable 0.5 milliGRAM(s) IV Push every 6 hours PRN anxiety/agitation/distress  ondansetron Injectable 4 milliGRAM(s) IV Push every 6 hours PRN Nausea and/or Vomiting      Care Discussed with Consultants/Other Providers [ ] YES  [ ] NO

## 2022-06-05 NOTE — PROGRESS NOTE ADULT - SUBJECTIVE AND OBJECTIVE BOX
GAP TEAM PALLIATIVE CARE UNIT PROGRESS NOTE:      [  ] Patient on hospice program.    INDICATION FOR PALLIATIVE CARE UNIT SERVICES/Interval HPI: symptom management at end of life    OVERNIGHT EVENTS: Unarousable. Used IV Dilaudid 0.5mg x1, and Tylenol 650mg x1 for fever, which improved. Hypertensive to 182/71, started IV Hydralazine 10mg q4h PRN SBP>160.       DNR on chart: Yes  Yes      Allergies    No Known Allergies    Intolerances    MEDICATIONS  (STANDING):  levETIRAcetam  IVPB 1000 milliGRAM(s) IV Intermittent every 12 hours  levothyroxine Injectable 25 MICROGram(s) IV Push at bedtime  valproate sodium  IVPB 500 milliGRAM(s) IV Intermittent two times a day    MEDICATIONS  (PRN):  acetaminophen  Suppository .. 650 milliGRAM(s) Rectal every 6 hours PRN Temp greater or equal to 38C (100.4F)  bisacodyl Suppository 10 milliGRAM(s) Rectal daily PRN Constipation  glycopyrrolate Injectable 0.4 milliGRAM(s) IV Push every 8 hours PRN heavy secretions  hydrALAZINE Injectable 10 milliGRAM(s) IV Push every 4 hours PRN SBP > 160  HYDROmorphone  Injectable 0.5 milliGRAM(s) IV Push every 1 hour PRN Severe Pain (7 - 10)  HYDROmorphone  Injectable 0.5 milliGRAM(s) IV Push every 1 hour PRN dyspnea  LORazepam   Injectable 0.5 milliGRAM(s) IV Push every 6 hours PRN anxiety/agitation/distress  ondansetron Injectable 4 milliGRAM(s) IV Push every 6 hours PRN Nausea and/or Vomiting    ITEMS UNCHECKED ARE NOT PRESENT    PRESENT SYMPTOMS: [x ]Unable to self-report  [x ]PAINADs [ x]RDOS  Source if other than patient:  [ ]Family   [ ]Team     Pain: [ ] yes [x] no  QOL impact -   Location -                    Aggravating factors -  Quality -  Radiation -  Timing-  Severity (0-10 scale):  Minimal acceptable level (0-10 scale):     PAINAD Score: 0  [email or web address] (Ctrl +  left click to view)    Dyspnea:                           [ ]Mild [ ]Moderate [ ]Severe    RDOS: 1  0 to 2  minimal or no respiratory distress   3  mild distress  4 to 6 moderate distress  >7 severe distress  [email or web address]_Distress_Observation_Scale.pdf (Ctrl +  left click to view)     Anxiety:                             [ ]Mild [ ]Moderate [ ]Severe  Fatigue:                             [ ]Mild [ ]Moderate [ ]Severe  Nausea:                             [ ]Mild [ ]Moderate [ ]Severe  Loss of appetite:              [ ]Mild [ ]Moderate [ ]Severe  Constipation:                    [ ]Mild [ ]Moderate [ ]Severe  		  Other Symptoms:  [ ]All other review of systems negative     Palliative Performance Status Version 2:    10     %         [email or web address]/[email or web address]_performance_[email or web address]_[email or web address]  PHYSICAL EXAM:   Vital Signs Last 24 Hrs  T(C): 37.6 (05 Jun 2022 08:27), Max: 38.1 (04 Jun 2022 12:41)  T(F): 99.7 (05 Jun 2022 08:27), Max: 100.5 (04 Jun 2022 12:41)  HR: 88 (05 Jun 2022 08:27) (88 - 98)  BP: 182/71 (05 Jun 2022 08:27) (155/67 - 182/71)  BP(mean): --  RR: 18 (05 Jun 2022 08:27) (18 - 18)  SpO2: 99% (05 Jun 2022 08:27) (95% - 99%) I&O's Summary    04 Jun 2022 07:01  -  05 Jun 2022 07:00  --------------------------------------------------------  IN: 0 mL / OUT: 0 mL / NET: 0 mL      GENERAL:  [ ]Alert  [ ]Oriented x   [ ]Lethargic  [ ]Cachexia  [ x]Unarousable  [ ]Verbal  [x ]Non-Verbal  Behavioral:   [ ] Anxiety  [ ] Delirium [ ] Agitation [ ] Other  HEENT:  [ ]Normal   [ ]Dry mouth   [ ]ET Tube/Trach  [ ]Oral lesions  PULMONARY:   [x ]Clear [ ]Tachypnea  [ ]Audible excessive secretions   [ ]Rhonchi        [ ]Right [ ]Left [ ]Bilateral  [ ]Crackles        [ ]Right [ ]Left [ ]Bilateral  [ ]Wheezing     [ ]Right [ ]Left [ ]Bilateral  CARDIOVASCULAR:    [x ]Regular [ ]Irregular [ ]Tachy  [ ]Praveen [ ]Murmur [ ]Other  GASTROINTESTINAL:  [x ]Soft  [ ]Distended   [ ]+BS  [ ]Non tender [ ]Tender  [ ]PEG [ ]OGT/ NGT  Last BM: GENITOURINARY:  [ ]Normal [ ] Incontinent   [ ]Oliguria/Anuria   [ ]Roy  MUSCULOSKELETAL:   [ ]Normal   [ ]Weakness  [x ]Bed/Wheelchair bound [ ]Edema  NEUROLOGIC:   [ ]No focal deficits  [x ] Cognitive impairment  [ ] Dysphagia [ ]Dysarthria [ ] Paresis [x ]Other   SKIN:   [ ]Normal   [ ]Pressure ulcer(s)  [ ]Rash    CRITICAL CARE:  [ ] Shock Present  [ ]Septic [ ]Cardiogenic [ ]Neurologic [ ]Hypovolemic  [ ]  Vasopressors [ ]  Inotropes   [ ] Respiratory failure present  [ ] Acute  [ ] Chronic [ ] Hypoxic  [ ] Hypercarbic [ ] Other  [ ] Other organ failure         LABS: None New    RADIOLOGY & ADDITIONAL STUDIES: None New    PROTEIN CALORIE MALNUTRITION: [ ] mild [ ] moderate [ ] severe  [ ] underweight [ ] morbid obesity    https://www.andeal.org/vault/2440/web/files/ONC/Table_Clinical%20Characteristics%20to%20Document%20Malnutrition-White%20JV%20et%20al%202012.pdf    Height (cm): 160 (06-03-22 @ 14:28), 160 (05-13-22 @ 15:10), 160 (04-04-22 @ 19:54)  Weight (kg): 49.9 (06-03-22 @ 14:28), 63.5 (05-13-22 @ 15:10), 52.2 (04-04-22 @ 19:54)  BMI (kg/m2): 19.5 (06-03-22 @ 14:28), 24.8 (05-13-22 @ 15:10), 20.4 (04-04-22 @ 19:54)    [x] PPSV2 < or = 30% [ ] significant weight loss [ ] poor nutritional intake [ ] anasarca   Artificial Nutrition [ ]     REFERRALS:   [ ]Chaplaincy  [ ] Hospice  [ ]Child Life  [ ]Social Work  [ ]Case management [ ]Holistic Therapy [ ] Physical Therapy [ ] Dietary   Goals of Care Document:

## 2022-06-06 NOTE — PROGRESS NOTE ADULT - NS ATTEND AMEND GEN_ALL_CORE FT
88F, DNR/DNI with hx of dementia living in a LTC facility, breast CA s/p mastectomy, prior CVA, wheelchair bound at baseline, on no AC/AP, s/p fall.  Work up - large intraparenchymal hemorrhage in the right temporoparietal region with ventricular extension and layering hemorrhage in both lateral ventricles. There is associated leftward midline shift of 5 mm with partial effacement of the right lateral ventricle. Basal cisterns patent. No uncal herniation. There is vasogenic edema.  Given this finding, neurosurgery was consulted and no surgical options would be of benefit.  Patient is transferred to PCU for symptom management and disposition planning.  Hospice transition to be addressed if clinical condition permits.   In the setting of parenteral controlled substance administration, clinical monitoring required for side effects such as respiratory depression, constipation and opioid induced neurotoxicity.  IV hydromorphone was uptitrated today due to escalation of symptoms.  This patient is at high risk due to brain hemorrhage.  Patient assessment and plan discussed on interdisciplinary team rounds today.   No expectation of meaningful recovery.

## 2022-06-06 NOTE — PROGRESS NOTE ADULT - PROBLEM SELECTOR PLAN 9
Sunitha - daughter and decision maker.   She verbally confirmed patient is DNR/DNI and she is the health care proxy. She lives far from the hospital but patient does have friends in the area whom will come to visit.  Continue PCU level of care.

## 2022-06-06 NOTE — PROGRESS NOTE ADULT - SUBJECTIVE AND OBJECTIVE BOX
Patient is a 89y old  Female who presents with a chief complaint of S/p fall (06 Jun 2022 11:06)      INTERVAL HPI/OVERNIGHT EVENTS:  T(C): 37.1 (06-06-22 @ 09:29), Max: 37.1 (06-06-22 @ 09:29)  HR: 112 (06-06-22 @ 09:29) (112 - 112)  BP: 139/71 (06-06-22 @ 09:29) (139/71 - 139/71)  RR: 22 (06-06-22 @ 09:29) (22 - 22)  SpO2: 80% (06-06-22 @ 09:29) (80% - 80%)  Wt(kg): --  I&O's Summary    05 Jun 2022 07:01  -  06 Jun 2022 07:00  --------------------------------------------------------  IN: 0 mL / OUT: 850 mL / NET: -850 mL    06 Jun 2022 07:01  -  06 Jun 2022 23:21  --------------------------------------------------------  IN: 0 mL / OUT: 300 mL / NET: -300 mL        PAST MEDICAL & SURGICAL HISTORY:  HTN (hypertension)      Breast CA      Dementia      Hypothyroid      History of subdural hematoma      History of colon resection      H/O mastectomy  left      History of hip surgery  right          SOCIAL HISTORY  Alcohol:  Tobacco:  Illicit substance use:    FAMILY HISTORY:    REVIEW OF SYSTEMS:  CONSTITUTIONAL: No fever, weight loss, or fatigue  EYES: No eye pain, visual disturbances, or discharge  ENMT:  No difficulty hearing, tinnitus, vertigo; No sinus or throat pain  NECK: No pain or stiffness  RESPIRATORY: No cough, wheezing, chills or hemoptysis; No shortness of breath  CARDIOVASCULAR: No chest pain, palpitations, dizziness, or leg swelling  GASTROINTESTINAL: No abdominal or epigastric pain. No nausea, vomiting, or hematemesis; No diarrhea or constipation. No melena or hematochezia.  GENITOURINARY: No dysuria, frequency, hematuria, or incontinence  NEUROLOGICAL: No headaches, memory loss, loss of strength, numbness, or tremors  SKIN: No itching, burning, rashes, or lesions   LYMPH NODES: No enlarged glands  ENDOCRINE: No heat or cold intolerance; No hair loss  MUSCULOSKELETAL: No joint pain or swelling; No muscle, back, or extremity pain  PSYCHIATRIC: No depression, anxiety, mood swings, or difficulty sleeping  HEME/LYMPH: No easy bruising, or bleeding gums  ALLERY AND IMMUNOLOGIC: No hives or eczema    RADIOLOGY & ADDITIONAL TESTS:    Imaging Personally Reviewed:  [ ] YES  [ ] NO    Consultant(s) Notes Reviewed:  [ ] YES  [ ] NO    PHYSICAL EXAM:  GENERAL: NAD, well-groomed, well-developed  HEAD:  Atraumatic, Normocephalic  EYES: EOMI, PERRLA, conjunctiva and sclera clear  ENMT: No tonsillar erythema, exudates, or enlargement; Moist mucous membranes, Good dentition, No lesions  NECK: Supple, No JVD, Normal thyroid  NERVOUS SYSTEM:  Alert & Oriented X3, Good concentration; Motor Strength 5/5 B/L upper and lower extremities; DTRs 2+ intact and symmetric  CHEST/LUNG: Clear to percussion bilaterally; No rales, rhonchi, wheezing, or rubs  HEART: Regular rate and rhythm; No murmurs, rubs, or gallops  ABDOMEN: Soft, Nontender, Nondistended; Bowel sounds present  EXTREMITIES:  2+ Peripheral Pulses, No clubbing, cyanosis, or edema  LYMPH: No lymphadenopathy noted  SKIN: No rashes or lesions    LABS:              CAPILLARY BLOOD GLUCOSE                MEDICATIONS  (STANDING):  glycopyrrolate Injectable 0.4 milliGRAM(s) IV Push every 6 hours  HYDROmorphone  Injectable 0.5 milliGRAM(s) IV Push every 6 hours  levETIRAcetam  IVPB 1000 milliGRAM(s) IV Intermittent every 12 hours  levothyroxine Injectable 25 MICROGram(s) IV Push at bedtime  valproate sodium  IVPB 500 milliGRAM(s) IV Intermittent two times a day    MEDICATIONS  (PRN):  acetaminophen  Suppository .. 650 milliGRAM(s) Rectal every 6 hours PRN Temp greater or equal to 38C (100.4F)  bisacodyl Suppository 10 milliGRAM(s) Rectal daily PRN Constipation  hydrALAZINE Injectable 10 milliGRAM(s) IV Push every 4 hours PRN SBP > 160  HYDROmorphone  Injectable 0.5 milliGRAM(s) IV Push every 1 hour PRN Moderate Pain (4 - 6)  HYDROmorphone  Injectable 1 milliGRAM(s) IV Push every 1 hour PRN Severe Pain (7 - 10)  HYDROmorphone  Injectable 1 milliGRAM(s) IV Push every 1 hour PRN dyspnea  LORazepam   Injectable 0.5 milliGRAM(s) IV Push every 4 hours PRN anxiety/agitation/distress  ondansetron Injectable 4 milliGRAM(s) IV Push every 6 hours PRN Nausea and/or Vomiting      Care Discussed with Consultants/Other Providers [ ] YES  [ ] NO

## 2022-06-06 NOTE — PROGRESS NOTE ADULT - NS ATTEST RISK PROBLEM GEN_ALL_CORE FT
In the setting of parenteral controlled substance administration, clinical monitoring required for side effects such as respiratory depression, constipation and opioid induced neurotoxicity.  This patient is at high risk due to brain hemorrhage.

## 2022-06-07 NOTE — PROGRESS NOTE ADULT - SUBJECTIVE AND OBJECTIVE BOX
Patient is a 89y old  Female who presents with a chief complaint of S/p fall (07 Jun 2022 11:23)      INTERVAL HPI/OVERNIGHT EVENTS:  T(C): 37.1 (06-07-22 @ 10:31), Max: 37.1 (06-07-22 @ 10:31)  HR: 88 (06-07-22 @ 10:31) (88 - 88)  BP: 158/78 (06-07-22 @ 10:31) (158/78 - 158/78)  RR: 20 (06-07-22 @ 10:31) (20 - 20)  SpO2: 90% (06-07-22 @ 10:31) (90% - 90%)  Wt(kg): --  I&O's Summary    06 Jun 2022 07:01  -  07 Jun 2022 07:00  --------------------------------------------------------  IN: 0 mL / OUT: 400 mL / NET: -400 mL        PAST MEDICAL & SURGICAL HISTORY:  HTN (hypertension)      Breast CA      Dementia      Hypothyroid      History of subdural hematoma      History of colon resection      H/O mastectomy  left      History of hip surgery  right          SOCIAL HISTORY  Alcohol:  Tobacco:  Illicit substance use:    FAMILY HISTORY:    REVIEW OF SYSTEMS:  CONSTITUTIONAL: No fever, weight loss, or fatigue  EYES: No eye pain, visual disturbances, or discharge  ENMT:  No difficulty hearing, tinnitus, vertigo; No sinus or throat pain  NECK: No pain or stiffness  RESPIRATORY: No cough, wheezing, chills or hemoptysis; No shortness of breath  CARDIOVASCULAR: No chest pain, palpitations, dizziness, or leg swelling  GASTROINTESTINAL: No abdominal or epigastric pain. No nausea, vomiting, or hematemesis; No diarrhea or constipation. No melena or hematochezia.  GENITOURINARY: No dysuria, frequency, hematuria, or incontinence  NEUROLOGICAL: No headaches, memory loss, loss of strength, numbness, or tremors  SKIN: No itching, burning, rashes, or lesions   LYMPH NODES: No enlarged glands  ENDOCRINE: No heat or cold intolerance; No hair loss  MUSCULOSKELETAL: No joint pain or swelling; No muscle, back, or extremity pain  PSYCHIATRIC: No depression, anxiety, mood swings, or difficulty sleeping  HEME/LYMPH: No easy bruising, or bleeding gums  ALLERY AND IMMUNOLOGIC: No hives or eczema    RADIOLOGY & ADDITIONAL TESTS:    Imaging Personally Reviewed:  [ ] YES  [ ] NO    Consultant(s) Notes Reviewed:  [ ] YES  [ ] NO    PHYSICAL EXAM:  GENERAL: NAD, well-groomed, well-developed  HEAD:  Atraumatic, Normocephalic  EYES: EOMI, PERRLA, conjunctiva and sclera clear  ENMT: No tonsillar erythema, exudates, or enlargement; Moist mucous membranes, Good dentition, No lesions  NECK: Supple, No JVD, Normal thyroid  NERVOUS SYSTEM:  Alert & Oriented X3, Good concentration; Motor Strength 5/5 B/L upper and lower extremities; DTRs 2+ intact and symmetric  CHEST/LUNG: Clear to percussion bilaterally; No rales, rhonchi, wheezing, or rubs  HEART: Regular rate and rhythm; No murmurs, rubs, or gallops  ABDOMEN: Soft, Nontender, Nondistended; Bowel sounds present  EXTREMITIES:  2+ Peripheral Pulses, No clubbing, cyanosis, or edema  LYMPH: No lymphadenopathy noted  SKIN: No rashes or lesions    LABS:              CAPILLARY BLOOD GLUCOSE                MEDICATIONS  (STANDING):  glycopyrrolate Injectable 0.4 milliGRAM(s) IV Push every 6 hours  HYDROmorphone  Injectable 0.5 milliGRAM(s) IV Push every 6 hours  levETIRAcetam  IVPB 1000 milliGRAM(s) IV Intermittent every 12 hours  levothyroxine Injectable 25 MICROGram(s) IV Push at bedtime  valproate sodium  IVPB 500 milliGRAM(s) IV Intermittent two times a day    MEDICATIONS  (PRN):  acetaminophen  Suppository .. 650 milliGRAM(s) Rectal every 6 hours PRN Temp greater or equal to 38C (100.4F)  bisacodyl Suppository 10 milliGRAM(s) Rectal daily PRN Constipation  hydrALAZINE Injectable 10 milliGRAM(s) IV Push every 4 hours PRN SBP > 160  HYDROmorphone  Injectable 0.5 milliGRAM(s) IV Push every 1 hour PRN Moderate Pain (4 - 6)  HYDROmorphone  Injectable 1 milliGRAM(s) IV Push every 1 hour PRN Severe Pain (7 - 10)  HYDROmorphone  Injectable 1 milliGRAM(s) IV Push every 1 hour PRN dyspnea  LORazepam   Injectable 0.5 milliGRAM(s) IV Push every 4 hours PRN anxiety/agitation/distress  ondansetron Injectable 4 milliGRAM(s) IV Push every 6 hours PRN Nausea and/or Vomiting      Care Discussed with Consultants/Other Providers [ ] YES  [ ] NO

## 2022-06-07 NOTE — PROGRESS NOTE ADULT - PROBLEM SELECTOR PLAN 8
Sunitha - daughter and decision maker.   Confirmed patient is DNR/DNI and she is the health care proxy. She lives far from the hospital but patient does have friends in the area whom will come to visit.  Continue PCU level of care. Hospice transition to be addressed if clinical condition permits. In the setting of parenteral controlled substance administration, clinical monitoring required for side effects such as respiratory depression, constipation and opioid induced neurotoxicity.  This patient is at high risk due to brain hemorrhage.

## 2022-06-07 NOTE — PROGRESS NOTE ADULT - SUBJECTIVE AND OBJECTIVE BOX
GAP TEAM PALLIATIVE ARE UNIT PROGRESS NOTE:      [  ] Patient on hospice program.    INDICATION FOR PALLIATIVE CARE UNIT SERVICES/Interval HPI: Symptom management in the setting of a 88t/oF with PMH Dementia, p/w fall. CT head demonstrated a large right acute temporal parietal hematoma with intraventricular extension.     OVERNIGHT EVENTS: Chart reviewed. The patient is seen and examined at the bedside. She required PRN Ativan 0.25mg IV X0, PRN Dilaudid 0.2mg IV X0 for pain and X 0 for dyspnea within a 24hr period 8am-8am.    DNR on chart: Yes  Yes    Allergies    No Known Allergies  Vital Signs Last 24 Hrs  T(C): 37.1 (07 Jun 2022 10:31), Max: 37.1 (07 Jun 2022 10:31)  T(F): 98.8 (07 Jun 2022 10:31), Max: 98.8 (07 Jun 2022 10:31)  HR: 88 (07 Jun 2022 10:31) (88 - 88)  BP: 158/78 (07 Jun 2022 10:31) (158/78 - 158/78)  BP(mean): --  RR: 20 (07 Jun 2022 10:31) (20 - 20)  SpO2: 90% (07 Jun 2022 10:31) (90% - 90%)  Intolerances    ITEMS UNCHECKED ARE NOT PRESENT    PRESENT SYMPTOMS: [ X]Unable to self-report  [X ]PAINADs [X ]RDOS  Source if other than patient:  [ ]Family   [ X]Team     Pain: [ ] yes [X ] no  QOL impact -   Location -                    Aggravating factors -  Quality -  Radiation -  Timing-  Severity (0-10 scale):  Minimal acceptable level (0-10 scale):     PAINAD Score: 0  [email or web address]/cog/[email or web address].pdf (Ctrl +  left click to view)    Dyspnea:                           [ ]Mild [ ]Moderate [ ]Severe    RDOS: 2  0 to 2  minimal or no respiratory distress   3  mild distress  4 to 6 moderate distress  >7 severe distress  [email or web address]_Distress_Observation_Scale.[email or web address] (Ctrl +  left click to view)     Anxiety:                             [ ]Mild [ ]Moderate [ ]Severe  Fatigue:                             [ ]Mild [ ]Moderate [ ]Severe  Nausea:                             [ ]Mild [ ]Moderate [ ]Severe  Loss of appetite:              [ ]Mild [ ]Moderate [ ]Severe  Constipation:                    [ ]Mild [ ]Moderate [ ]Severe  		  Other Symptoms:  [ ]All other review of systems negative- unable to assess    Palliative Performance Status Version 2:  10 %         [email or web address]/[email or web address]_performance_scale_[email or web address]    PHYSICAL EXAM:   Vital Signs Last 24 Hrs  T(C): 37.1 (07 Jun 2022 10:31), Max: 37.1 (07 Jun 2022 10:31)  T(F): 98.8 (07 Jun 2022 10:31), Max: 98.8 (07 Jun 2022 10:31)  HR: 88 (07 Jun 2022 10:31) (88 - 88)  BP: 158/78 (07 Jun 2022 10:31) (158/78 - 158/78)  BP(mean): --  RR: 20 (07 Jun 2022 10:31) (20 - 20)  SpO2: 90% (07 Jun 2022 10:31) (90% - 90%)    GENERAL: [ ] Cachexia  [ ]Alert  [ ]Oriented x   [X ]Lethargic  [ ]Unarousable  [ ]Verbal  [X ]Non-Verbal  Behavioral:   [ ] Anxiety  [ ] Delirium [ ] Agitation [ ] Other  HEENT:  [ ]Normal   [ X]Dry mouth   [ ]ET Tube/Trach  [ ]Oral lesions  PULMONARY:   [ ]Clear [ ]Tachypnea  [X ]Audible excessive secretions   [ ]Rhonchi        [ ]Right [ ]Left [ ]Bilateral  [ ]Crackles        [ ]Right [ ]Left [ ]Bilateral  [ ]Wheezing     [ ]Right [ ]Left [ ]Bilateral  [ ]Diminished BS [ ]Right [ ]Left [ ]Bilateral    CARDIOVASCULAR:    [ ]Regular [ ]Irregular [X ]Tachy  [ ]Praveen [ ]Murmur [ ]Other  GASTROINTESTINAL:  [ X]Soft  [ ]Distended   [ X]+BS  [ X]Non tender [ ]Tender  [ ]Other [ ]PEG [ ]OGT/ NGT   Last BM:  6/5/2022  GENITOURINARY:  [ ]Normal [X ] Incontinent   [ ]Oliguria/Anuria   [X ]Roy  MUSCULOSKELETAL:   [ ]Normal   [X ]Weakness  [ X]Bed/Wheelchair bound [ ]Edema  NEUROLOGIC:   [ ]No focal deficits  [X ] Cognitive impairment  [ ] Dysphagia [ ]Dysarthria [ ] Paresis [ ]Other   SKIN:   [ ]Normal  [ ]Rash  [ ]Other  [X ]Pressure ulcer(s)  [ ]y [ ]n  Present on admission  Left hip DTI. Please see nursing assessment for further details for which I have reviewed.     CRITICAL CARE:  [ ] Shock Present  [ ]Septic [ ]Cardiogenic [ ]Neurologic [ ]Hypovolemic  [ ]  Vasopressors [ ]  Inotropes   [ ] Respiratory failure present [ ] Mechanical Ventilation [ ] Non-invasive ventilatory support [ ] High-Flow  [ ] Acute  [ ] Chronic [ ] Hypoxic  [ ] Hypercarbic [ ] Other  [X ] Other organ failure- Brain, skin    LABS: Reviewed    RADIOLOGY & ADDITIONAL STUDIES: none new    PROTEIN CALORIE MALNUTRITION: [ ] mild [ ] moderate [ ] severe  [ ] underweight [ ] morbid obesity    https://www.andeal.org/vault/2440/web/files/ONC/Table_Clinical%20Characteristics%20to%20Document%20Malnutrition-White%20JV%20et%20al%202012.pdf    Height (cm): 160 (06-03-22 @ 14:28), 160 (05-13-22 @ 15:10), 160 (04-04-22 @ 19:54)  Weight (kg): 49.9 (06-03-22 @ 14:28), 63.5 (05-13-22 @ 15:10), 52.2 (04-04-22 @ 19:54)  BMI (kg/m2): 19.5 (06-03-22 @ 14:28), 24.8 (05-13-22 @ 15:10), 20.4 (04-04-22 @ 19:54)    [ X] PPSV2 < or = 30% [ ] significant weight loss [ ] poor nutritional intake [ ] anasarca   Artificial Nutrition [ ]     REFERRALS:   [ X]Chaplaincy  [ ] Hospice  [ ]Child Life  [X ]Social Work  [ ]Case management [ ]Holistic Therapy [ ] Physical Therapy [ ] Dietary   Goals of Care Document:

## 2022-06-07 NOTE — PROGRESS NOTE ADULT - PROBLEM SELECTOR PLAN 1
Order received from Kulwant Devries MD for IR Port Placement    Patient: Silvina Landers  MRN #: [id number]  Age: 68year old  : 1949  Date: 2022    Past Medical History:   Diagnosis Date   â¢ Abnormal tilt table test    â¢ Anemia    â¢ Anxiety    â¢ Atrial fibrillation (CMS/HCC)    â¢ Babcock's esophagus    â¢ BPH (benign prostatic hyperplasia)    â¢ CAD (coronary artery disease)    â¢ Cardiac arrest (CMS/HCC)    â¢ Cataract    â¢ Esophagitis, reflux    â¢ Essential (primary) hypertension    â¢ High cholesterol    â¢ Hypertrophic cardiomyopathy (CMS/HCC)    â¢ Legal blindness    â¢ Leukopenia    â¢ Long term (current) use of anticoagulants    â¢ Meningitis    â¢ VIJAY (obstructive sleep apnea)    â¢ Osteoarthritis    â¢ Sleep apnea     uses CPAP   â¢ Snoring    â¢ Stroke (CMS/HCC)    â¢ Syncope        Past Surgical History:   Procedure Laterality Date   â¢ Anesth,carotid/vertebral arteriogram     â¢ Appendectomy     â¢ Cervical laminectomy  2010    C5-7 - Dr Ramila Bangura   â¢ Colonoscopy     â¢ Eye surgery     â¢ Open coronary endarterectomy     â¢ Skin lesion excision     â¢ Tilt table     â¢ Upper gi endoscopy,exam         ALLERGIES:   Allergen Reactions   â¢ Clonidine Other (See Comments)     Headache,weakness, elevated blood pressures   â¢ Amlodipine NAUSEA   â¢ Chlorthalidone NAUSEA   â¢ Diltiazem NAUSEA   â¢ Fluvastatin Nausea & Vomiting   â¢ Lescol NAUSEA   â¢ Lisinopril NAUSEA   â¢ Pravastatin NAUSEA   â¢ Simvastatin NAUSEA   â¢ Terazosin NAUSEA   â¢ Verapamil NAUSEA       Current Outpatient Medications   Medication Sig Dispense Refill   â¢ hydrALAZINE (APRESOLINE) 25 MG tablet Take 25 mg by mouth every 6 hours. â¢ carvedilol (COREG) 6.25 MG tablet Take 6.25 mg by mouth. â¢ amLODIPine (NORVASC) 5 MG tablet Take 1 tablet by mouth daily. 30 tablet 6   â¢ FERROUS SULFATE PO Liquid - patient days every other day on odd days - unsure of dose     â¢ Cyanocobalamin 1000 MCG Cap Take 1 capsule by mouth daily.      â¢ nystatin (MYCOSTATIN) 243723 UNIT/GM cream      â¢ furosemide (LASIX) 20 MG tablet Take 20 mg by mouth daily. â¢ warfarin (COUMADIN) 2.5 MG tablet Take 2.5 mg on Thursdays and 1.25 mg all other days (Mon, Tues, Weds, Fri, Sat, Sun) for INR goal 2-2.5 Monitored at [facility] [place]. â¢ propafenone (RYTHMOL) 225 MG tablet Take 225 mg by mouth every 8 hours. â¢ Multiple Vitamins-Minerals (MULTIVITAMIN ADULT PO) Take 1 tablet by mouth daily     â¢ losartan (COZAAR) 50 MG tablet Take 50 mg by mouth 2 times daily. â¢ polyethylene glycol (GLYCOLAX, MIRALAX) packet Take 17 g by mouth daily. â¢ Potassium Gluconate 595 MG Cap Take 595 mg by mouth daily. â¢ fenofibrate 160 MG tablet Take 160 mg by mouth daily. No current facility-administered medications for this visit. Imaging:     Date of Order Received: 6/7/22    Plan: Port placement in IR with moderate    Triaging Provider: Van Stein MD    Performing Physician: Any     Anesthesia / Sedation requirement : Moderate    Blood thinner: warfarin (COUMADIN) 2.5 MG tablet    Modality: IR    STAT/ASAP/Routine: Routine      Procedure date:  Wednesday, 6/15/22  Arrival time: 1200  Procedure time: 1330  NPO solids: 0500  NPO liquids: 0700 Likely hypertensive   Orders per neurology team   SBP goal < 140.

## 2022-06-08 NOTE — PROGRESS NOTE ADULT - PROBLEM SELECTOR PLAN 8
Sunitha - Niece and decision maker.   Confirmed patient is DNR/DNI and she is the health care proxy. She lives far from the hospital but patient does have friends in the area whom will come to visit.  Continue PCU level of care. Hospice transition to be addressed if clinical condition permits. In the setting of parenteral controlled substance administration, clinical monitoring required for side effects such as respiratory depression, constipation and opioid induced neurotoxicity.  This patient is at high risk due to brain hemorrhage. Sunitha - Freddie and decision maker.   Confirmed patient is DNR/DNI and she is the health care proxy. She lives far from the hospital but patient does have friends in the area whom will come to visit.  Continue PCU level of care. Hospice transition to be addressed if clinical condition permits. In the setting of parenteral controlled substance administration, clinical monitoring required for side effects such as respiratory depression, constipation and opioid induced neurotoxicity.  This patient is at high risk due to brain hemorrhage.    Spoke with Freddie Helton today--Informed regarding the patient's clinical status and plan for transfer to inpatient hospice. Miss Helton is in agreement.   She is travelling out of the country tomorrow. Relayed the information to Social work who is making the referral and will attempt to complete the process today if we are able to receive consent in time.

## 2022-06-08 NOTE — PROGRESS NOTE ADULT - SUBJECTIVE AND OBJECTIVE BOX
GAP TEAM PALLIATIVE CARE UNIT PROGRESS NOTE:      [  ] Patient on hospice program.    INDICATION FOR PALLIATIVE CARE UNIT SERVICES/Interval HPI: Symptom management in the setting of a 88/oF with PMH Dementia, prior ischemic stroke (Not on AC), who p/w fall. CT head demonstrated a large right acute temporal parietal hematoma with intraventricular extension.     OVERNIGHT EVENTS: Chart reviewed. The patient is seen and examined at the bedside. She required PRN Ativan 0.25mg IV X0, PRN Dilaudid 0.2mg IV X0 for pain and X 0 for dyspnea within a 24hr period 8am-8am.    DNR on chart: Yes  Yes      Allergies    No Known Allergies    Intolerances    MEDICATIONS  (STANDING):  glycopyrrolate Injectable 0.4 milliGRAM(s) IV Push every 6 hours  HYDROmorphone  Injectable 0.5 milliGRAM(s) IV Push every 6 hours  levETIRAcetam  IVPB 1000 milliGRAM(s) IV Intermittent every 12 hours  levothyroxine Injectable 25 MICROGram(s) IV Push at bedtime  valproate sodium  IVPB 500 milliGRAM(s) IV Intermittent two times a day    MEDICATIONS  (PRN):  acetaminophen  Suppository .. 650 milliGRAM(s) Rectal every 6 hours PRN Temp greater or equal to 38C (100.4F)  bisacodyl Suppository 10 milliGRAM(s) Rectal daily PRN Constipation  hydrALAZINE Injectable 10 milliGRAM(s) IV Push every 4 hours PRN SBP > 160  HYDROmorphone  Injectable 0.5 milliGRAM(s) IV Push every 1 hour PRN Moderate Pain (4 - 6)  HYDROmorphone  Injectable 1 milliGRAM(s) IV Push every 1 hour PRN Severe Pain (7 - 10)  HYDROmorphone  Injectable 1 milliGRAM(s) IV Push every 1 hour PRN dyspnea  LORazepam   Injectable 0.5 milliGRAM(s) IV Push every 4 hours PRN anxiety/agitation/distress  ondansetron Injectable 4 milliGRAM(s) IV Push every 6 hours PRN Nausea and/or Vomiting    ITEMS UNCHECKED ARE NOT PRESENT    PRESENT SYMPTOMS: [x]Unable to self-report  [x]PAINADs [x]RDOS  Source if other than patient:  [ ]Family   [x]Team     Pain: [ ] yes [x] no  QOL impact -   Location -                    Aggravating factors -  Quality -  Radiation -  Timing-  Severity (0-10 scale):  Minimal acceptable level (0-10 scale):     PAINAD Score: 0  [email or web address]/painad.pdf (Ctrl +  left click to view)    Dyspnea:                           [ ]Mild [ ]Moderate [ ]Severe    RDOS: 2  0 to 2  minimal or no respiratory distress   3  mild distress  4 to 6 moderate distress  >7 severe distress  [email or web address] (Ctrl +  left click to view)     Anxiety:                             [ ]Mild [ ]Moderate [ ]Severe  Fatigue:                             [ ]Mild [ ]Moderate [ ]Severe  Nausea:                             [ ]Mild [ ]Moderate [ ]Severe  Loss of appetite:              [ ]Mild [ ]Moderate [ ]Severe  Constipation:                    [ ]Mild [ ]Moderate [ ]Severe  		  Other Symptoms:  [ ]All other review of systems negative     Palliative Performance Status Version 2:      10   %         [email or web address]/[email or web address]_performance_scale_[email or web address]  PHYSICAL EXAM:   Vital Signs Last 24 Hrs  T(C): 36.6 (08 Jun 2022 08:55), Max: 36.6 (08 Jun 2022 08:55)  T(F): 97.8 (08 Jun 2022 08:55), Max: 97.8 (08 Jun 2022 08:55)  HR: 80 (08 Jun 2022 08:55) (80 - 80)  BP: 191/73 (08 Jun 2022 08:55) (191/73 - 191/73)  BP(mean): --  RR: 20 (08 Jun 2022 08:55) (20 - 20)  SpO2: 93% (08 Jun 2022 08:55) (93% - 93%) I&O's Summary    07 Jun 2022 07:01  -  08 Jun 2022 07:00  --------------------------------------------------------  IN: 0 mL / OUT: 350 mL / NET: -350 mL      GENERAL: [ ] Cachexia  [ ]Alert  [ ]Oriented x   [x ]Lethargic  [ ]Unarousable  [ ]Verbal  [x ]Non-Verbal  Behavioral:   [ ] Anxiety  [ ] Delirium [ ] Agitation [ ] Other  HEENT:  [ ]Normal   [x]Dry mouth   [ ]ET Tube/Trach  [ ]Oral lesions  PULMONARY:   [ ]Clear [ ]Tachypnea  [x]Audible excessive secretions   [ ]Rhonchi        [ ]Right [ ]Left [ ]Bilateral  [ ]Crackles        [ ]Right [ ]Left [ ]Bilateral  [ ]Wheezing     [ ]Right [ ]Left [ ]Bilateral  [ ]Diminished BS [ ]Right [ ]Left [ ]Bilateral    CARDIOVASCULAR:    [x]Regular [ ]Irregular [ ]Tachy  [ ]Praveen [ ]Murmur [ ]Other  GASTROINTESTINAL:  [ x]Soft  [ ]Distended   [x ]+BS  [ x]Non tender [ ]Tender  [ ]Other [ ]PEG [ ]OGT/ NGT   Last BM:    GENITOURINARY:  [ ]Normal [ ] Incontinent   [ ]Oliguria/Anuria   [x ]Roy  MUSCULOSKELETAL:   [ ]Normal   [ ]Weakness  [x ]Bed/Wheelchair bound [ ]Edema  NEUROLOGIC:   [ ]No focal deficits  [ ] Cognitive impairment  [ ] Dysphagia [ ]Dysarthria [ ] Paresis [ ]Other   SKIN:   [ ]Normal  [ ]Rash  [ ]Other  [x ]Pressure ulcer(s)  [ ]y [ ]n  Present on admission      CRITICAL CARE:  [ ] Shock Present  [ ]Septic [ ]Cardiogenic [ ]Neurologic [ ]Hypovolemic  [ ]  Vasopressors [ ]  Inotropes   [ ] Respiratory failure present [ ] Mechanical Ventilation [ ] Non-invasive ventilatory support [ ] High-Flow  [ ] Acute  [ ] Chronic [ ] Hypoxic  [ ] Hypercarbic [ ] Other  [ ] Other organ failure     LABS: reviewed      RADIOLOGY & ADDITIONAL STUDIES:    PROTEIN CALORIE MALNUTRITION: [ ] mild [ ] moderate [ ] severe  [ ] underweight [ ] morbid obesity    https://www.andeal.org/vault/2440/web/files/ONC/Table_Clinical%20Characteristics%20to%20Document%20Malnutrition-White%20JV%20et%20al%202012.pdf    Height (cm): 160 (06-03-22 @ 14:28), 160 (05-13-22 @ 15:10), 160 (04-04-22 @ 19:54)  Weight (kg): 49.9 (06-03-22 @ 14:28), 63.5 (05-13-22 @ 15:10), 52.2 (04-04-22 @ 19:54)  BMI (kg/m2): 19.5 (06-03-22 @ 14:28), 24.8 (05-13-22 @ 15:10), 20.4 (04-04-22 @ 19:54)    [ x] PPSV2 < or = 30% [ ] significant weight loss [ x] poor nutritional intake [ ] anasarca   Artificial Nutrition [ ]     REFERRALS:   [x ]Chaplaincy  [ ] Hospice  [ ]Child Life  [x ]Social Work  [ ]Case management [ ]Holistic Therapy [ ] Physical Therapy [ ] Dietary   Goals of Care Document:  GAP TEAM PALLIATIVE CARE UNIT PROGRESS NOTE:      [  ] Patient on hospice program.    INDICATION FOR PALLIATIVE CARE UNIT SERVICES/Interval HPI: Symptom management in the setting of a 88/oF with PMH Dementia, prior ischemic stroke (Not on AC), who p/w fall. CT head demonstrated a large right acute temporal parietal hematoma with intraventricular extension.     OVERNIGHT EVENTS: Chart reviewed. The patient is seen and examined at the bedside. She required PRN Ativan 0.25mg IV X0, PRN Dilaudid 0.2mg IV X0 for pain and X 0 for dyspnea within a 24hr period 8am-8am.    DNR on chart: Yes  Yes      Allergies    No Known Allergies    Intolerances    MEDICATIONS  (STANDING):  glycopyrrolate Injectable 0.4 milliGRAM(s) IV Push every 6 hours  HYDROmorphone  Injectable 0.5 milliGRAM(s) IV Push every 6 hours  levETIRAcetam  IVPB 1000 milliGRAM(s) IV Intermittent every 12 hours  levothyroxine Injectable 25 MICROGram(s) IV Push at bedtime  valproate sodium  IVPB 500 milliGRAM(s) IV Intermittent two times a day    MEDICATIONS  (PRN):  acetaminophen  Suppository .. 650 milliGRAM(s) Rectal every 6 hours PRN Temp greater or equal to 38C (100.4F)  bisacodyl Suppository 10 milliGRAM(s) Rectal daily PRN Constipation  hydrALAZINE Injectable 10 milliGRAM(s) IV Push every 4 hours PRN SBP > 160  HYDROmorphone  Injectable 0.5 milliGRAM(s) IV Push every 1 hour PRN Moderate Pain (4 - 6)  HYDROmorphone  Injectable 1 milliGRAM(s) IV Push every 1 hour PRN Severe Pain (7 - 10)  HYDROmorphone  Injectable 1 milliGRAM(s) IV Push every 1 hour PRN dyspnea  LORazepam   Injectable 0.5 milliGRAM(s) IV Push every 4 hours PRN anxiety/agitation/distress  ondansetron Injectable 4 milliGRAM(s) IV Push every 6 hours PRN Nausea and/or Vomiting    ITEMS UNCHECKED ARE NOT PRESENT    PRESENT SYMPTOMS: [x]Unable to self-report  [x]PAINADs [x]RDOS  Source if other than patient:  [ ]Family   [x]Team     Pain: [ ] yes [x] no  QOL impact -   Location -                    Aggravating factors -  Quality -  Radiation -  Timing-  Severity (0-10 scale):  Minimal acceptable level (0-10 scale):     PAINAD Score: 0  [email or web address].[email or web address]/cog/painad.pdf (Ctrl +  left click to view)    Dyspnea:                           [ ]Mild [ ]Moderate [ ]Severe    RDOS: 2  0 to 2  minimal or no respiratory distress   3  mild distress  4 to 6 moderate distress  >7 severe distress  [email or web address]/[email or web address]_Distress_Observation_Scale.pdf (Ctrl +  left click to view)     Anxiety:                             [ ]Mild [ ]Moderate [ ]Severe  Fatigue:                             [ ]Mild [ ]Moderate [ ]Severe  Nausea:                             [ ]Mild [ ]Moderate [ ]Severe  Loss of appetite:              [ ]Mild [ ]Moderate [ ]Severe  Constipation:                    [ ]Mild [ ]Moderate [ ]Severe  		  Other Symptoms:  [ ]All other review of systems negative     Palliative Performance Status Version 2:      10   %         [email or web address]://[email or web address]/files/[email or web address]_performance_[email or web address]_[email or web address]  PHYSICAL EXAM:   Vital Signs Last 24 Hrs  T(C): 36.6 (08 Jun 2022 08:55), Max: 36.6 (08 Jun 2022 08:55)  T(F): 97.8 (08 Jun 2022 08:55), Max: 97.8 (08 Jun 2022 08:55)  HR: 80 (08 Jun 2022 08:55) (80 - 80)  BP: 191/73 (08 Jun 2022 08:55) (191/73 - 191/73)  BP(mean): --  RR: 20 (08 Jun 2022 08:55) (20 - 20)  SpO2: 93% (08 Jun 2022 08:55) (93% - 93%) I&O's Summary    07 Jun 2022 07:01  -  08 Jun 2022 07:00  --------------------------------------------------------  IN: 0 mL / OUT: 350 mL / NET: -350 mL      GENERAL: [ ] Cachexia  [ ]Alert  [ ]Oriented x   [x ]Lethargic  [ ]Unarousable  [ ]Verbal  [x ]Non-Verbal  Behavioral:   [ ] Anxiety  [ ] Delirium [ ] Agitation [ ] Other  HEENT:  [ ]Normal   [x]Dry mouth   [ ]ET Tube/Trach  [ ]Oral lesions  PULMONARY:   [ ]Clear [ ]Tachypnea  [x]Audible excessive secretions   [ ]Rhonchi        [ ]Right [ ]Left [ ]Bilateral  [ ]Crackles        [ ]Right [ ]Left [ ]Bilateral  [ ]Wheezing     [ ]Right [ ]Left [ ]Bilateral  [ ]Diminished BS [ ]Right [ ]Left [ ]Bilateral    CARDIOVASCULAR:    [x]Regular [ ]Irregular [ ]Tachy  [ ]Praveen [ ]Murmur [ ]Other  GASTROINTESTINAL:  [ x]Soft  [ ]Distended   [x ]+BS  [ x]Non tender [ ]Tender  [ ]Other [ ]PEG [ ]OGT/ NGT   Last BM:  fecal incontinence 6/5  GENITOURINARY:  [ ]Normal [x ] Incontinent   [ ]Oliguria/Anuria   [x ]Roy  MUSCULOSKELETAL:   [ ]Normal   [ ]Weakness  [x ]Bed/Wheelchair bound [ ]Edema  NEUROLOGIC:   [ ]No focal deficits  [ x] Cognitive impairment  [ ] Dysphagia [ ]Dysarthria [ ] Paresis [ ]Other   SKIN:   [ ]Normal  [ ]Rash  [ ]Other  [x ]Pressure ulcer(s)  [ ]y [ ]n  Present on admission  Please see RN documentation which I have reviewed.     CRITICAL CARE:  [ ] Shock Present  [ ]Septic [ ]Cardiogenic [ ]Neurologic [ ]Hypovolemic  [ ]  Vasopressors [ ]  Inotropes   [ ] Respiratory failure present [ ] Mechanical Ventilation [ ] Non-invasive ventilatory support [ ] High-Flow  [ ] Acute  [ ] Chronic [ ] Hypoxic  [ ] Hypercarbic [ ] Other  [x ] Other organ failure brain, skin    LABS: reviewed      RADIOLOGY & ADDITIONAL STUDIES: I have reviewed all pertinent imaging, laboratory and microbiology studies at this visit.      PROTEIN CALORIE MALNUTRITION: [ ] mild x[ ] moderate [ ] severe  [ ] underweight [ ] morbid obesity    https://www.andeal.org/vault/6920/web/files/ONC/Table_Clinical%20Characteristics%20to%20Document%20Malnutrition-White%20JV%20et%20al%202012.pdf    Height (cm): 160 (06-03-22 @ 14:28), 160 (05-13-22 @ 15:10), 160 (04-04-22 @ 19:54)  Weight (kg): 49.9 (06-03-22 @ 14:28), 63.5 (05-13-22 @ 15:10), 52.2 (04-04-22 @ 19:54)  BMI (kg/m2): 19.5 (06-03-22 @ 14:28), 24.8 (05-13-22 @ 15:10), 20.4 (04-04-22 @ 19:54)    [ x] PPSV2 < or = 30% [ ] significant weight loss [ x] poor nutritional intake [ ] anasarca   Artificial Nutrition [ ]     REFERRALS:   [x ]Chaplaincy  [ ] Hospice  [ ]Child Life  [x ]Social Work  [ ]Case management [ ]Holistic Therapy [ ] Physical Therapy [ ] Dietary   Goals of Care Document:

## 2022-06-08 NOTE — DIETITIAN INITIAL EVALUATION ADULT - PERTINENT MEDS FT
MEDICATIONS  (STANDING):  glycopyrrolate Injectable 0.4 milliGRAM(s) IV Push every 6 hours  HYDROmorphone  Injectable 0.5 milliGRAM(s) IV Push every 6 hours  levETIRAcetam  IVPB 1000 milliGRAM(s) IV Intermittent every 12 hours  levothyroxine Injectable 25 MICROGram(s) IV Push at bedtime  valproate sodium  IVPB 500 milliGRAM(s) IV Intermittent two times a day    MEDICATIONS  (PRN):  acetaminophen  Suppository .. 650 milliGRAM(s) Rectal every 6 hours PRN Temp greater or equal to 38C (100.4F)  bisacodyl Suppository 10 milliGRAM(s) Rectal daily PRN Constipation  hydrALAZINE Injectable 10 milliGRAM(s) IV Push every 4 hours PRN SBP > 160  HYDROmorphone  Injectable 0.5 milliGRAM(s) IV Push every 1 hour PRN Moderate Pain (4 - 6)  HYDROmorphone  Injectable 1 milliGRAM(s) IV Push every 1 hour PRN Severe Pain (7 - 10)  HYDROmorphone  Injectable 1 milliGRAM(s) IV Push every 1 hour PRN dyspnea  LORazepam   Injectable 0.5 milliGRAM(s) IV Push every 4 hours PRN anxiety/agitation/distress  ondansetron Injectable 4 milliGRAM(s) IV Push every 6 hours PRN Nausea and/or Vomiting

## 2022-06-08 NOTE — DIETITIAN INITIAL EVALUATION ADULT - REASON INDICATOR FOR ASSESSMENT
Pt seen for inadequate diet order  Source: Medical record and RN staff (pt not appropriate for follow up)

## 2022-06-09 NOTE — PROGRESS NOTE ADULT - SUBJECTIVE AND OBJECTIVE BOX
GAP TEAM PALLIATIVE CARE UNIT PROGRESS NOTE:      [  ] Patient on hospice program.    INDICATION FOR PALLIATIVE CARE UNIT SERVICES/Interval HPI: Symptom management in the setting of a 88/oF with PMH Dementia, prior ischemic stroke (Not on AC), who p/w fall. CT head demonstrated a large right acute temporal parietal hematoma with intraventricular extension.     OVERNIGHT EVENTS: Chart reviewed. The patient is seen and examined at the bedside. She required PRN Ativan 0.25mg IV X0, PRN Dilaudid 0.2mg IV X0 for pain and X 0 for dyspnea within a 24hr period 8am-8am.    DNR on chart: Yes  Yes      Allergies    No Known Allergies    Intolerances    MEDICATIONS  (STANDING):  glycopyrrolate Injectable 0.4 milliGRAM(s) IV Push every 6 hours  HYDROmorphone  Injectable 0.5 milliGRAM(s) IV Push every 6 hours  levETIRAcetam  IVPB 1000 milliGRAM(s) IV Intermittent every 12 hours  levothyroxine Injectable 25 MICROGram(s) IV Push at bedtime  valproate sodium  IVPB 500 milliGRAM(s) IV Intermittent two times a day    MEDICATIONS  (PRN):  acetaminophen  Suppository .. 650 milliGRAM(s) Rectal every 6 hours PRN Temp greater or equal to 38C (100.4F)  bisacodyl Suppository 10 milliGRAM(s) Rectal daily PRN Constipation  hydrALAZINE Injectable 10 milliGRAM(s) IV Push every 4 hours PRN SBP > 160  HYDROmorphone  Injectable 0.5 milliGRAM(s) IV Push every 1 hour PRN Moderate Pain (4 - 6)  HYDROmorphone  Injectable 1 milliGRAM(s) IV Push every 1 hour PRN Severe Pain (7 - 10)  HYDROmorphone  Injectable 1 milliGRAM(s) IV Push every 1 hour PRN dyspnea  LORazepam   Injectable 0.5 milliGRAM(s) IV Push every 4 hours PRN anxiety/agitation/distress  ondansetron Injectable 4 milliGRAM(s) IV Push every 6 hours PRN Nausea and/or Vomiting    ITEMS UNCHECKED ARE NOT PRESENT    PRESENT SYMPTOMS: [ ]Unable to self-report  [ ]PAINADs [ ]RDOS  Source if other than patient:  [ ]Family   [ ]Team     Pain: [ ] yes [x ] no  QOL impact -   Location -                    Aggravating factors -  Quality -  Radiation -  Timing-  Severity (0-10 scale):  Minimal acceptable level (0-10 scale):     PAINAD Score: 0  [email or web address] (Ctrl +  left click to view)    Dyspnea:                           []Mild [ ]Moderate [ ]Severe    RDOS: 1  0 to 2  minimal or no respiratory distress   3  mild distress  4 to 6 moderate distress  >7 severe distress  [email or web address]_[email or web address] (Ctrl +  left click to view)     Anxiety:                             [ ]Mild [ ]Moderate [ ]Severe  Fatigue:                             [ ]Mild [ ]Moderate [ ]Severe  Nausea:                             [ ]Mild [ ]Moderate [ ]Severe  Loss of appetite:              [ ]Mild [ ]Moderate [ ]Severe  Constipation:                    [ ]Mild [ ]Moderate [ ]Severe  		  Other Symptoms:  [ ]All other review of systems negative     Palliative Performance Status Version 2:       10  %         [email or web address]  PHYSICAL EXAM:   Vital Signs Last 24 Hrs  T(C): 36.5 (09 Jun 2022 08:38), Max: 36.5 (09 Jun 2022 08:38)  T(F): 97.7 (09 Jun 2022 08:38), Max: 97.7 (09 Jun 2022 08:38)  HR: 95 (09 Jun 2022 08:38) (95 - 95)  BP: 172/81 (09 Jun 2022 08:38) (172/81 - 172/81)  BP(mean): --  RR: 20 (09 Jun 2022 08:38) (20 - 20)  SpO2: 91% (09 Jun 2022 08:38) (91% - 91%) I&O's Summary    08 Jun 2022 07:01  -  09 Jun 2022 07:00  --------------------------------------------------------  IN: 0 mL / OUT: 550 mL / NET: -550 mL      GENERAL: [ ] Cachexia  [ ]Alert  [ ]Oriented x   [ x]Lethargic  [ ]Unarousable  [ ]Verbal  [ x]Non-Verbal  Behavioral:   [ ] Anxiety  [ ] Delirium [ ] Agitation [ ] Other  HEENT:  [ ]Normal   [ ]Dry mouth   [ ]ET Tube/Trach  [ ]Oral lesions  PULMONARY:   [ x]Clear [ ]Tachypnea  [ ]Audible excessive secretions   [ ]Rhonchi        [ ]Right [ ]Left [ ]Bilateral  [ ]Crackles        [ ]Right [ ]Left [ ]Bilateral  [ ]Wheezing     [ ]Right [ ]Left [ ]Bilateral  [ ]Diminished BS [ ]Right [ ]Left [ ]Bilateral    CARDIOVASCULAR:    [ ]Regular [ ]Irregular [ ]Tachy  [ ]Praveen [ ]Murmur [ ]Other  GASTROINTESTINAL:  [ x]Soft  [ ]Distended   [sluggish ]+BS  [x ]Non tender [ ]Tender  [ ]Other [ ]PEG [ ]OGT/ NGT   Last BM:  6/5/22  GENITOURINARY:  [ ]Normal [ ] Incontinent   [ ]Oliguria/Anuria   [ ]Roy  MUSCULOSKELETAL:   [ ]Normal   [ ]Weakness  [ x]Bed/Wheelchair bound [ ]Edema  NEUROLOGIC:   [ ]No focal deficits  [x ] Cognitive impairment  [ ] Dysphagia [ ]Dysarthria [ ] Paresis [ ]Other   SKIN:   [ ]Normal  [ ]Rash  [ ]Other  [x ]Pressure ulcer(s)  [ ]y [ ]n  Present on admission      CRITICAL CARE:  [ ] Shock Present  [ ]Septic [ ]Cardiogenic [ ]Neurologic [ ]Hypovolemic  [ ]  Vasopressors [ ]  Inotropes   [ ] Respiratory failure present [ ] Mechanical Ventilation [ ] Non-invasive ventilatory support [ ] High-Flow  [ ] Acute  [ ] Chronic [ ] Hypoxic  [ ] Hypercarbic [ ] Other  [ x] Other organ failure Brain, Skin    LABS:            RADIOLOGY & ADDITIONAL STUDIES:    PROTEIN CALORIE MALNUTRITION: [ ] mild [ ] moderate [ ] severe  [ ] underweight [ ] morbid obesity    https://www.andeal.org/vault/2440/web/files/ONC/Table_Clinical%20Characteristics%20to%20Document%20Malnutrition-White%20JV%20et%20al%202012.pdf    Height (cm): 160 (06-03-22 @ 14:28), 160 (05-13-22 @ 15:10), 160 (04-04-22 @ 19:54)  Weight (kg): 49.9 (06-03-22 @ 14:28), 63.5 (05-13-22 @ 15:10), 52.2 (04-04-22 @ 19:54)  BMI (kg/m2): 19.5 (06-03-22 @ 14:28), 24.8 (05-13-22 @ 15:10), 20.4 (04-04-22 @ 19:54)    [ x] PPSV2 < or = 30% [ ] significant weight loss [ ] poor nutritional intake [ ] anasarca   Artificial Nutrition [ ]     REFERRALS:   [ ]Chaplaincy  [ ] Hospice  [ ]Child Life  [ ]Social Work  [ ]Case management [ ]Holistic Therapy [ ] Physical Therapy [ ] Dietary   Goals of Care Document:  GAP TEAM PALLIATIVE CARE UNIT PROGRESS NOTE:      [  ] Patient on hospice program.    INDICATION FOR PALLIATIVE CARE UNIT SERVICES/Interval HPI: Symptom management in the setting of a 88/oF with PMH Dementia, prior ischemic stroke (Not on AC), who p/w fall. CT head demonstrated a large right acute temporal parietal hematoma with intraventricular extension.     OVERNIGHT EVENTS: Chart reviewed. The patient is seen and examined at the bedside. She required PRN Ativan 0.25mg IV X0, PRN Dilaudid 0.2mg IV X0 for pain and X 0 for dyspnea within a 24hr period 8am-8am.    DNR on chart: Yes  Yes      Allergies    No Known Allergies    Intolerances    MEDICATIONS  (STANDING):  glycopyrrolate Injectable 0.4 milliGRAM(s) IV Push every 6 hours  HYDROmorphone  Injectable 0.5 milliGRAM(s) IV Push every 6 hours  levETIRAcetam  IVPB 1000 milliGRAM(s) IV Intermittent every 12 hours  levothyroxine Injectable 25 MICROGram(s) IV Push at bedtime  valproate sodium  IVPB 500 milliGRAM(s) IV Intermittent two times a day    MEDICATIONS  (PRN):  acetaminophen  Suppository .. 650 milliGRAM(s) Rectal every 6 hours PRN Temp greater or equal to 38C (100.4F)  bisacodyl Suppository 10 milliGRAM(s) Rectal daily PRN Constipation  hydrALAZINE Injectable 10 milliGRAM(s) IV Push every 4 hours PRN SBP > 160  HYDROmorphone  Injectable 0.5 milliGRAM(s) IV Push every 1 hour PRN Moderate Pain (4 - 6)  HYDROmorphone  Injectable 1 milliGRAM(s) IV Push every 1 hour PRN Severe Pain (7 - 10)  HYDROmorphone  Injectable 1 milliGRAM(s) IV Push every 1 hour PRN dyspnea  LORazepam   Injectable 0.5 milliGRAM(s) IV Push every 4 hours PRN anxiety/agitation/distress  ondansetron Injectable 4 milliGRAM(s) IV Push every 6 hours PRN Nausea and/or Vomiting    ITEMS UNCHECKED ARE NOT PRESENT    PRESENT SYMPTOMS: [x ]Unable to self-report  [ x]PAINADs [x ]RDOS  Source if other than patient:  [ ]Family   [ x]Team     Pain: [ ] yes [x ] no  QOL impact -   Location -                    Aggravating factors -  Quality -  Radiation -  Timing-  Severity (0-10 scale):  Minimal acceptable level (0-10 scale):     PAINAD Score: 0  [email or web address] (Ctrl +  left click to view)    Dyspnea:                           []Mild [ ]Moderate [ ]Severe    RDOS: 1  0 to 2  minimal or no respiratory distress   3  mild distress  4 to 6 moderate distress  >7 severe distress  [email or web address]_[email or web address] (Ctrl +  left click to view)     Anxiety:                             [ ]Mild [ ]Moderate [ ]Severe  Fatigue:                             [ ]Mild [ ]Moderate [ ]Severe  Nausea:                             [ ]Mild [ ]Moderate [ ]Severe  Loss of appetite:              [ ]Mild [ ]Moderate [ ]Severe  Constipation:                    [ ]Mild [ ]Moderate [ ]Severe  		  Other Symptoms:  x[ ]All other review of systems negative     Palliative Performance Status Version 2:       10  %         [email or web address]  PHYSICAL EXAM:   Vital Signs Last 24 Hrs  T(C): 36.5 (09 Jun 2022 08:38), Max: 36.5 (09 Jun 2022 08:38)  T(F): 97.7 (09 Jun 2022 08:38), Max: 97.7 (09 Jun 2022 08:38)  HR: 95 (09 Jun 2022 08:38) (95 - 95)  BP: 172/81 (09 Jun 2022 08:38) (172/81 - 172/81)  BP(mean): --  RR: 20 (09 Jun 2022 08:38) (20 - 20)  SpO2: 91% (09 Jun 2022 08:38) (91% - 91%) I&O's Summary    08 Jun 2022 07:01  -  09 Jun 2022 07:00  --------------------------------------------------------  IN: 0 mL / OUT: 550 mL / NET: -550 mL      GENERAL: [ ] Cachexia  [ ]Alert  [ ]Oriented x   [ x]Lethargic  [ ]Unarousable  [ ]Verbal  [ x]Non-Verbal  Behavioral:   [ ] Anxiety  [ ] Delirium [ ] Agitation [ ] Other  HEENT:  [x ]Normal   [ ]Dry mouth   [ ]ET Tube/Trach  [ ]Oral lesions  PULMONARY:   [ x]Clear [ ]Tachypnea  [ ]Audible excessive secretions   [ ]Rhonchi        [ ]Right [ ]Left [ ]Bilateral  [ ]Crackles        [ ]Right [ ]Left [ ]Bilateral  [ ]Wheezing     [ ]Right [ ]Left [ ]Bilateral  [ ]Diminished BS [ ]Right [ ]Left [ ]Bilateral    CARDIOVASCULAR:    [ x]Regular [ ]Irregular [ ]Tachy  [ ]Praveen [ ]Murmur [ ]Other  GASTROINTESTINAL:  [ x]Soft  [ ]Distended   [sluggish ]+BS  [x ]Non tender [ ]Tender  [ ]Other [ ]PEG [ ]OGT/ NGT   Last BM:  6/5/22  GENITOURINARY:  [ ]Normal [ x] Incontinent   [ ]Oliguria/Anuria   [x ]Roy  MUSCULOSKELETAL:   [ ]Normal   [ ]Weakness  [ x]Bed/Wheelchair bound [ ]Edema  NEUROLOGIC:   [ ]No focal deficits  [x ] Cognitive impairment  [ ] Dysphagia [ ]Dysarthria [ ] Paresis [ ]Other   SKIN:   [ ]Normal  [ ]Rash  [ ]Other  [  ]Pressure ulcer(s)  [ ]y [ ]n  Present on admission      CRITICAL CARE:  [ ] Shock Present  [ ]Septic [ ]Cardiogenic [ ]Neurologic [ ]Hypovolemic  [ ]  Vasopressors [ ]  Inotropes   [ ] Respiratory failure present [ ] Mechanical Ventilation [ ] Non-invasive ventilatory support [ ] High-Flow  [ ] Acute  [ ] Chronic [ ] Hypoxic  [ ] Hypercarbic [ ] Other  [ x] Other organ failure Brain, Skin    LABS:  None new.           RADIOLOGY & ADDITIONAL STUDIES:  None new.     PROTEIN CALORIE MALNUTRITION: [ ] mild [ ] moderate [ ] severe  [ ] underweight [ ] morbid obesity    https://www.andeal.org/vault/2440/web/files/ONC/Table_Clinical%20Characteristics%20to%20Document%20Malnutrition-White%20JV%20et%20al%202012.pdf    Height (cm): 160 (06-03-22 @ 14:28), 160 (05-13-22 @ 15:10), 160 (04-04-22 @ 19:54)  Weight (kg): 49.9 (06-03-22 @ 14:28), 63.5 (05-13-22 @ 15:10), 52.2 (04-04-22 @ 19:54)  BMI (kg/m2): 19.5 (06-03-22 @ 14:28), 24.8 (05-13-22 @ 15:10), 20.4 (04-04-22 @ 19:54)    [ x] PPSV2 < or = 30% [ ] significant weight loss [ ] poor nutritional intake [ ] anasarca   Artificial Nutrition [ ]     REFERRALS:   [ ]Chaplaincy  [ ] Hospice  [ ]Child Life  [ ]Social Work  [ ]Case management [ ]Holistic Therapy [ ] Physical Therapy [ ] Dietary   Goals of Care Document:

## 2022-06-09 NOTE — PROGRESS NOTE ADULT - SUBJECTIVE AND OBJECTIVE BOX
Patient is a 89y old  Female who presents with a chief complaint of S/p fall (09 Jun 2022 13:43)      INTERVAL HPI/OVERNIGHT EVENTS:  T(C): 36.5 (06-09-22 @ 08:38), Max: 36.5 (06-09-22 @ 08:38)  HR: 95 (06-09-22 @ 08:38) (95 - 95)  BP: 172/81 (06-09-22 @ 08:38) (172/81 - 172/81)  RR: 20 (06-09-22 @ 08:38) (20 - 20)  SpO2: 91% (06-09-22 @ 08:38) (91% - 91%)  Wt(kg): --  I&O's Summary    08 Jun 2022 07:01  -  09 Jun 2022 07:00  --------------------------------------------------------  IN: 0 mL / OUT: 550 mL / NET: -550 mL        PAST MEDICAL & SURGICAL HISTORY:  HTN (hypertension)      Breast CA      Dementia      Hypothyroid      History of subdural hematoma      History of colon resection      H/O mastectomy  left      History of hip surgery  right          SOCIAL HISTORY  Alcohol:  Tobacco:  Illicit substance use:    FAMILY HISTORY:    REVIEW OF SYSTEMS:  CONSTITUTIONAL: No fever, weight loss, or fatigue  EYES: No eye pain, visual disturbances, or discharge  ENMT:  No difficulty hearing, tinnitus, vertigo; No sinus or throat pain  NECK: No pain or stiffness  RESPIRATORY: No cough, wheezing, chills or hemoptysis; No shortness of breath  CARDIOVASCULAR: No chest pain, palpitations, dizziness, or leg swelling  GASTROINTESTINAL: No abdominal or epigastric pain. No nausea, vomiting, or hematemesis; No diarrhea or constipation. No melena or hematochezia.  GENITOURINARY: No dysuria, frequency, hematuria, or incontinence  NEUROLOGICAL: No headaches, memory loss, loss of strength, numbness, or tremors  SKIN: No itching, burning, rashes, or lesions   LYMPH NODES: No enlarged glands  ENDOCRINE: No heat or cold intolerance; No hair loss  MUSCULOSKELETAL: No joint pain or swelling; No muscle, back, or extremity pain  PSYCHIATRIC: No depression, anxiety, mood swings, or difficulty sleeping  HEME/LYMPH: No easy bruising, or bleeding gums  ALLERY AND IMMUNOLOGIC: No hives or eczema    RADIOLOGY & ADDITIONAL TESTS:    Imaging Personally Reviewed:  [ ] YES  [ ] NO    Consultant(s) Notes Reviewed:  [ ] YES  [ ] NO    PHYSICAL EXAM:  GENERAL: NAD, well-groomed, well-developed  HEAD:  Atraumatic, Normocephalic  EYES: EOMI, PERRLA, conjunctiva and sclera clear  ENMT: No tonsillar erythema, exudates, or enlargement; Moist mucous membranes, Good dentition, No lesions  NECK: Supple, No JVD, Normal thyroid  NERVOUS SYSTEM:  Alert & Oriented X3, Good concentration; Motor Strength 5/5 B/L upper and lower extremities; DTRs 2+ intact and symmetric  CHEST/LUNG: Clear to percussion bilaterally; No rales, rhonchi, wheezing, or rubs  HEART: Regular rate and rhythm; No murmurs, rubs, or gallops  ABDOMEN: Soft, Nontender, Nondistended; Bowel sounds present  EXTREMITIES:  2+ Peripheral Pulses, No clubbing, cyanosis, or edema  LYMPH: No lymphadenopathy noted  SKIN: No rashes or lesions    LABS:              CAPILLARY BLOOD GLUCOSE                MEDICATIONS  (STANDING):  glycopyrrolate Injectable 0.4 milliGRAM(s) IV Push every 6 hours  HYDROmorphone  Injectable 0.5 milliGRAM(s) IV Push every 6 hours  levETIRAcetam  IVPB 1000 milliGRAM(s) IV Intermittent every 12 hours  levothyroxine Injectable 25 MICROGram(s) IV Push at bedtime  valproate sodium  IVPB 500 milliGRAM(s) IV Intermittent two times a day    MEDICATIONS  (PRN):  acetaminophen  Suppository .. 650 milliGRAM(s) Rectal every 6 hours PRN Temp greater or equal to 38C (100.4F)  bisacodyl Suppository 10 milliGRAM(s) Rectal daily PRN Constipation  hydrALAZINE Injectable 10 milliGRAM(s) IV Push every 4 hours PRN SBP > 160  HYDROmorphone  Injectable 0.5 milliGRAM(s) IV Push every 1 hour PRN Moderate Pain (4 - 6)  HYDROmorphone  Injectable 1 milliGRAM(s) IV Push every 1 hour PRN Severe Pain (7 - 10)  HYDROmorphone  Injectable 1 milliGRAM(s) IV Push every 1 hour PRN dyspnea  LORazepam   Injectable 0.5 milliGRAM(s) IV Push every 4 hours PRN anxiety/agitation/distress  ondansetron Injectable 4 milliGRAM(s) IV Push every 6 hours PRN Nausea and/or Vomiting      Care Discussed with Consultants/Other Providers [ ] YES  [ ] NO

## 2022-06-09 NOTE — PROGRESS NOTE ADULT - PROBLEM SELECTOR PLAN 8
Sunitha - Niece and decision maker.   Confirmed patient is DNR/DNI and she is the health care proxy. She lives far from the hospital but patient does have friends in the area whom will come to visit.  Continue PCU level of care. Hospice referral was made. In the setting of parenteral controlled substance administration, clinical monitoring required for side effects such as respiratory depression, constipation and opioid induced neurotoxicity.  This patient is at high risk due to brain hemorrhage.    Hospice referral was made to [facility].   Miss Helton is travelling out of the country today. Relayed the information to Social work who has made the referral and will attempt to complete the process today if we are able to receive consent in time.    Additional contacts only for visitation as the pt's Niece is travelling out of country (Not for decision making):   Sarah Denson [phone].   Anatoliy Mckinley [phone].

## 2022-06-10 NOTE — PROGRESS NOTE ADULT - PROBLEM SELECTOR PLAN 3
- Dilaudid 0.5mg IV q1hr PRN moderate pain  - Dilaudid 1mg IV q1hr PRN severe pain  - Dilaudid 0.5mg IV q6hr ATC  - Bowel regimen while on opioids
- Dilaudid 0.5mg IV q1hr PRN moderate pain  - Dilaudid 1mg IV q1hr PRN severe pain  - Dilaudid 0.5mg IV q6hr ATC  - Bowel regimen while on opioids
C/w IV Dilaudid 0.5mg PRN Dyspnea (used x1 in 24hr 8AM-8AM)
- Dilaudid 0.5mg IV q1hr PRN moderate pain  - Dilaudid 1mg IV q1hr PRN severe pain  - Dilaudid 0.5mg IV q6hr ATC  - Bowel regimen while on opioids
- Dilaudid 0.5mg IV q1hr PRN moderate pain  - Dilaudid 1mg IV q1hr PRN severe pain  - Dilaudid 0.5mg IV q6hr ATC  - Bowel regimen while on opioids.
- Dilaudid 0.5mg IV q1hr PRN moderate pain  - Dilaudid 1mg IV q1hr PRN severe pain  - Dilaudid 0.5mg IV q6hr ATC  - Bowel regimen while on opioids  - Dulcolax given today-monitor for BM

## 2022-06-10 NOTE — PROGRESS NOTE ADULT - PROBLEM SELECTOR PLAN 5
- PPSV: 10% The patient requires nursing assistance with all ADLS  - Supportive care  - Turn and position  - Skin care as per hospital protocol.
- PPSV: 10% The patient requires nursing assistance with all ADLS  - Supportive care  - Turn and position  - Skin care as per hospital protocol
C/w Glycopyrrolate PRN
- PPSV: 10% The patient requires nursing assistance with all ADLS  - Supportive care  - Turn and position  - Skin care as per hospital protocol

## 2022-06-10 NOTE — PROGRESS NOTE ADULT - PROBLEM SELECTOR PLAN 2
goal to keep BP <160/90 per neuro recs  Hypertensive to 182/71 today  Start IV Hydralazine 10mg q4h PRN SBP >160
- The patient required PRN Dilaudid 0.5mg IV X 0 within a 24hr period after increase  - Continue PRN Dilaudid to 1mg IV q1hr PRN  - Continue Dilaudid 0.5mg IV q6hr ATC  - Bowel regimen while on opioids
- The patient required PRN Dilaudid 0.5mg IV X 0 within a 24hr period after increase  - Continue PRN Dilaudid to 1mg IV q1hr PRN  - Continue Dilaudid 0.5mg IV q6hr ATC  - Bowel regimen while on opioids.
- The patient required PRN Dilaudid 0.5mg IV X 0 within a 24hr period after increase  - Continue PRN Dilaudid to 1mg IV q1hr PRN  - Continue Dilaudid 0.5mg IV q6hr ATC  - Bowel regimen while on opioids
- The patient required PRN Dilaudid 0.5mg IV X 0 within a 24hr period after increase  - Continue PRN Dilaudid to 1mg IV q1hr PRN  - Continue Dilaudid 0.5mg IV q6hr ATC  - Bowel regimen while on opioids
- The patient required PRN Dilaudid 0.5mg IV X3 within a 24hr period  - Increased PRN Dilaudid to 1mg IV q1hr PRN  - Dilaudid 0.5mg IV q6hr ATC  - Bowel regimen while on opioids

## 2022-06-10 NOTE — PROGRESS NOTE ADULT - SUBJECTIVE AND OBJECTIVE BOX
Patient is a 89y old  Female who presents with a chief complaint of S/p fall (10 Xander 2022 09:20)      INTERVAL HPI/OVERNIGHT EVENTS:  T(C): 36.4 (06-10-22 @ 08:47), Max: 36.4 (06-10-22 @ 08:47)  HR: 84 (06-10-22 @ 08:47) (84 - 84)  BP: 129/73 (06-10-22 @ 08:47) (129/73 - 129/73)  RR: 20 (06-10-22 @ 08:47) (20 - 20)  SpO2: 91% (06-10-22 @ 08:47) (91% - 91%)  Wt(kg): --  I&O's Summary    09 Jun 2022 07:01  -  10 Xander 2022 07:00  --------------------------------------------------------  IN: 0 mL / OUT: 500 mL / NET: -500 mL    10 Xander 2022 07:01  -  10 Xander 2022 23:50  --------------------------------------------------------  IN: 0 mL / OUT: 400 mL / NET: -400 mL        PAST MEDICAL & SURGICAL HISTORY:  HTN (hypertension)      Breast CA      Dementia      Hypothyroid      History of subdural hematoma      History of colon resection      H/O mastectomy  left      History of hip surgery  right          SOCIAL HISTORY  Alcohol:  Tobacco:  Illicit substance use:    FAMILY HISTORY:    REVIEW OF SYSTEMS:  CONSTITUTIONAL: No fever, weight loss, or fatigue  EYES: No eye pain, visual disturbances, or discharge  ENMT:  No difficulty hearing, tinnitus, vertigo; No sinus or throat pain  NECK: No pain or stiffness  RESPIRATORY: No cough, wheezing, chills or hemoptysis; No shortness of breath  CARDIOVASCULAR: No chest pain, palpitations, dizziness, or leg swelling  GASTROINTESTINAL: No abdominal or epigastric pain. No nausea, vomiting, or hematemesis; No diarrhea or constipation. No melena or hematochezia.  GENITOURINARY: No dysuria, frequency, hematuria, or incontinence  NEUROLOGICAL: No headaches, memory loss, loss of strength, numbness, or tremors  SKIN: No itching, burning, rashes, or lesions   LYMPH NODES: No enlarged glands  ENDOCRINE: No heat or cold intolerance; No hair loss  MUSCULOSKELETAL: No joint pain or swelling; No muscle, back, or extremity pain  PSYCHIATRIC: No depression, anxiety, mood swings, or difficulty sleeping  HEME/LYMPH: No easy bruising, or bleeding gums  ALLERY AND IMMUNOLOGIC: No hives or eczema    RADIOLOGY & ADDITIONAL TESTS:    Imaging Personally Reviewed:  [ ] YES  [ ] NO    Consultant(s) Notes Reviewed:  [ ] YES  [ ] NO    PHYSICAL EXAM:  GENERAL: NAD, well-groomed, well-developed  HEAD:  Atraumatic, Normocephalic  EYES: EOMI, PERRLA, conjunctiva and sclera clear  ENMT: No tonsillar erythema, exudates, or enlargement; Moist mucous membranes, Good dentition, No lesions  NECK: Supple, No JVD, Normal thyroid  NERVOUS SYSTEM:  Alert & Oriented X3, Good concentration; Motor Strength 5/5 B/L upper and lower extremities; DTRs 2+ intact and symmetric  CHEST/LUNG: Clear to percussion bilaterally; No rales, rhonchi, wheezing, or rubs  HEART: Regular rate and rhythm; No murmurs, rubs, or gallops  ABDOMEN: Soft, Nontender, Nondistended; Bowel sounds present  EXTREMITIES:  2+ Peripheral Pulses, No clubbing, cyanosis, or edema  LYMPH: No lymphadenopathy noted  SKIN: No rashes or lesions    LABS:              CAPILLARY BLOOD GLUCOSE                MEDICATIONS  (STANDING):  glycopyrrolate Injectable 0.4 milliGRAM(s) IV Push every 6 hours  HYDROmorphone  Injectable 0.5 milliGRAM(s) IV Push every 6 hours  levETIRAcetam  IVPB 1000 milliGRAM(s) IV Intermittent every 12 hours  levothyroxine Injectable 25 MICROGram(s) IV Push at bedtime  valproate sodium  IVPB 500 milliGRAM(s) IV Intermittent two times a day    MEDICATIONS  (PRN):  acetaminophen  Suppository .. 650 milliGRAM(s) Rectal every 6 hours PRN Temp greater or equal to 38C (100.4F)  bisacodyl Suppository 10 milliGRAM(s) Rectal daily PRN Constipation  hydrALAZINE Injectable 10 milliGRAM(s) IV Push every 4 hours PRN SBP > 160  HYDROmorphone  Injectable 0.5 milliGRAM(s) IV Push every 1 hour PRN Moderate Pain (4 - 6)  HYDROmorphone  Injectable 1 milliGRAM(s) IV Push every 1 hour PRN Severe Pain (7 - 10)  HYDROmorphone  Injectable 1 milliGRAM(s) IV Push every 1 hour PRN dyspnea  LORazepam   Injectable 0.5 milliGRAM(s) IV Push every 4 hours PRN anxiety/agitation/distress  ondansetron Injectable 4 milliGRAM(s) IV Push every 6 hours PRN Nausea and/or Vomiting      Care Discussed with Consultants/Other Providers [ ] YES  [ ] NO

## 2022-06-10 NOTE — PROGRESS NOTE ADULT - ASSESSMENT
88F, dementia, lives at assisted living, presents to ED with left sided weakness and right sided eye gaze after a fall. EMS states staff at NH said patients last known normal was 5 hours prior to ED arrival. Patient has hx of stroke in Oct 2021. No AC. Never had a brain bleed. No fevers. No n/v/d. Patient unable to give history. (03 Jun 2022 18:22)    CT head demonstrated Large right acute temporal parietal hematoma with intraventricular extension. Hemorrhage seen extending into the bilateral lateral ventricles. Mass effect on the right lateral ventricle with midline shift to the left.  
88F, dementia, lives at assisted living, presents to ED with left sided weakness and right sided eye gaze after a fall. EMS states staff at NH said patients last known normal was 5 hours prior to ED arrival. Patient has hx of stroke in Oct 2021. No AC. Never had a brain bleed. No fevers. No n/v/d. Patient unable to give history. (03 Jun 2022 18:22)  CT head demonstrated Large right acute temporal parietal hematoma with intraventricular extension. Hemorrhage seen extending into the bilateral lateral ventricles. Mass effect on the right lateral ventricle with midline shift to the left.  GAP team was consulted for GOC. and symptom management. The patient is now on the PCU for symptom management. 
A/P    #ICH / adv dementia / nonverbal   # HTN   # Hx of breast Ca   -supportive care  -pt. is on comfort care now   -DNR/DNI   - seen by palliative care team   plan for transfer to PCU when bed available     
A/P    #ICH / adv dementia / nonverbal   # HTN   # Hx of breast Ca   -supportive care  -pt. is on comfort care now   -DNR/DNI   - seen by palliative care team   pt. now on comfort care in PCU    
88F, dementia, lives at assisted living, presents to ED with left sided weakness and right sided eye gaze after a fall. EMS states staff at NH said patients last known normal was 5 hours prior to ED arrival. Patient has hx of stroke in Oct 2021. No AC. Never had a brain bleed. No fevers. No n/v/d. Patient unable to give history. (03 Jun 2022 18:22)  CT head demonstrated Large right acute temporal parietal hematoma with intraventricular extension. Hemorrhage seen extending into the bilateral lateral ventricles. Mass effect on the right lateral ventricle with midline shift to the left.  GAP team was consulted for GOC. and symptom management. The patient is now on the PCU for symptom management. 
A/P    #ICH / adv dementia / nonverbal   # HTN   # Hx of breast Ca   -supportive care  -pt. is on comfort care now   -DNR/DNI   - seen by palliative care team   pt. now on comfort care in PCU    
88F, dementia, lives at assisted living, presents to ED with left sided weakness and right sided eye gaze after a fall. EMS states staff at NH said patients last known normal was 5 hours prior to ED arrival. Patient has hx of stroke in Oct 2021. No AC. Never had a brain bleed. No fevers. No n/v/d. Patient unable to give history. (03 Jun 2022 18:22)  CT head demonstrated Large right acute temporal parietal hematoma with intraventricular extension. Hemorrhage seen extending into the bilateral lateral ventricles. Mass effect on the right lateral ventricle with midline shift to the left.  GAP team was consulted for GOC. and symptom management. The patient is now on the PCU for symptom management. 

## 2022-06-10 NOTE — PROGRESS NOTE ADULT - PROBLEM SELECTOR PLAN 4
c/w ativan prn for BT myoclonus  c/w seizure px with keppra, valproate.
- Continue with Ativan 0.5mg IV q1hr PRN(1 required within a 24hr period 8am-8am)  - Monitor for constipation, urinary retention, pain, hunger, thirst, etc.  Promote sleep wake cycle and reorientation as indicated.
- Continue with Ativan 0.5mg IV q1hr PRN (0 required within a 24hr period 8am-8am)  - Monitor for constipation, urinary retention, pain, hunger, thirst, etc.  Promote sleep wake cycle and reorientation as indicated.

## 2022-06-10 NOTE — PROGRESS NOTE ADULT - PROBLEM SELECTOR PLAN 7
At risk of seizures.   ·  Plan: c/w ativan prn for BT myoclonus  c/w seizure px with keppra, valproate.
Sunitha - daughter and decision maker.   She verbally confirmed patient is DNR/DNI and she is the health care proxy. She lives far from the hospital but patient does have friends in the area whom will come to visit.  Continue PCU level of care.
- Goal to keep BP <160/90 per neuro recs  - IV Hydralazine 10mg q4h PRN SBP >160
c/w ativan prn for BT myoclonus  c/w seizure px with keppra, valproate.

## 2022-06-10 NOTE — PROGRESS NOTE ADULT - SUBJECTIVE AND OBJECTIVE BOX
[facility] TEAM PALLIATIVE CARE UNIT PROGRESS NOTE:      [  ] Patient on hospice program.    INDICATION FOR PALLIATIVE CARE UNIT SERVICES/Interval HPI:    OVERNIGHT EVENTS:    DNR on chart: Yes  Yes      Allergies    No Known Allergies    Intolerances    MEDICATIONS  (STANDING):  glycopyrrolate Injectable 0.4 milliGRAM(s) IV Push every 6 hours  HYDROmorphone  Injectable 0.5 milliGRAM(s) IV Push every 6 hours  levETIRAcetam  IVPB 1000 milliGRAM(s) IV Intermittent every 12 hours  levothyroxine Injectable 25 MICROGram(s) IV Push at bedtime  valproate sodium  IVPB 500 milliGRAM(s) IV Intermittent two times a day    MEDICATIONS  (PRN):  acetaminophen  Suppository .. 650 milliGRAM(s) Rectal every 6 hours PRN Temp greater or equal to 38C (100.4F)  bisacodyl Suppository 10 milliGRAM(s) Rectal daily PRN Constipation  hydrALAZINE Injectable 10 milliGRAM(s) IV Push every 4 hours PRN SBP > 160  HYDROmorphone  Injectable 0.5 milliGRAM(s) IV Push every 1 hour PRN Moderate Pain (4 - 6)  HYDROmorphone  Injectable 1 milliGRAM(s) IV Push every 1 hour PRN Severe Pain (7 - 10)  HYDROmorphone  Injectable 1 milliGRAM(s) IV Push every 1 hour PRN dyspnea  LORazepam   Injectable 0.5 milliGRAM(s) IV Push every 4 hours PRN anxiety/agitation/distress  ondansetron Injectable 4 milliGRAM(s) IV Push every 6 hours PRN Nausea and/or Vomiting    ITEMS UNCHECKED ARE NOT PRESENT    PRESENT SYMPTOMS: [ ]Unable to self-report  [ ]PAINADs [ ]RDOS  Source if other than patient:  [ ]Family   [ ]Team     Pain: [ ] yes [ ] no  QOL impact -   Location -                    Aggravating factors -  Quality -  Radiation -  Timing-  Severity (0-10 scale):  Minimal acceptable level (0-10 scale):     PAINAD Score:  [email or web address]/cog/painad.pdf (Ctrl +  left click to view)    Dyspnea:                           [ ]Mild [ ]Moderate [ ]Severe    RDOS:  0 to 2  minimal or no respiratory distress   3  mild distress  4 to 6 moderate distress  >7 severe distress  [email or web address]_Distress_Observation_Scale.pdf (Ctrl +  left click to view)     Anxiety:                             [ ]Mild [ ]Moderate [ ]Severe  Fatigue:                             [ ]Mild [ ]Moderate [ ]Severe  Nausea:                             [ ]Mild [ ]Moderate [ ]Severe  Loss of appetite:              [ ]Mild [ ]Moderate [ ]Severe  Constipation:                    [ ]Mild [ ]Moderate [ ]Severe  		  Other Symptoms:  [ ]All other review of systems negative     Palliative Performance Status Version 2:         %         http://[facility].org/files/news/palliative_performance_scale_[email or web address].pdf  PHYSICAL EXAM:   Vital Signs Last 24 Hrs  T(C): 36.4 (10 Xander 2022 08:47), Max: 36.4 (10 Xander 2022 08:47)  T(F): 97.6 (10 Xander 2022 08:47), Max: 97.6 (10 Xander 2022 08:47)  HR: 84 (10 Xander 2022 08:47) (84 - 84)  BP: 129/73 (10 Xander 2022 08:47) (129/73 - 129/73)  BP(mean): --  RR: 20 (10 Xander 2022 08:47) (20 - 20)  SpO2: 91% (10 Xander 2022 08:47) (91% - 91%) I&O's Summary    09 Jun 2022 07:01  -  10 Xander 2022 07:00  --------------------------------------------------------  IN: 0 mL / OUT: 500 mL / NET: -500 mL      GENERAL: [ ] Cachexia  [ ]Alert  [ ]Oriented x   [ ]Lethargic  [ ]Unarousable  [ ]Verbal  [ ]Non-Verbal  Behavioral:   [ ] Anxiety  [ ] Delirium [ ] Agitation [ ] Other  HEENT:  [ ]Normal   [ ]Dry mouth   [ ]ET Tube/Trach  [ ]Oral lesions  PULMONARY:   [ ]Clear [ ]Tachypnea  [ ]Audible excessive secretions   [ ]Rhonchi        [ ]Right [ ]Left [ ]Bilateral  [ ]Crackles        [ ]Right [ ]Left [ ]Bilateral  [ ]Wheezing     [ ]Right [ ]Left [ ]Bilateral  [ ]Diminished BS [ ]Right [ ]Left [ ]Bilateral    CARDIOVASCULAR:    [ ]Regular [ ]Irregular [ ]Tachy  [ ]Praveen [ ]Murmur [ ]Other  GASTROINTESTINAL:  [ ]Soft  [ ]Distended   [ ]+BS  [ ]Non tender [ ]Tender  [ ]Other [ ]PEG [ ]OGT/ NGT   Last BM:    GENITOURINARY:  [ ]Normal [ ] Incontinent   [ ]Oliguria/Anuria   [ ]Roy  MUSCULOSKELETAL:   [ ]Normal   [ ]Weakness  [ ]Bed/Wheelchair bound [ ]Edema  NEUROLOGIC:   [ ]No focal deficits  [ ] Cognitive impairment  [ ] Dysphagia [ ]Dysarthria [ ] Paresis [ ]Other   SKIN:   [ ]Normal  [ ]Rash  [ ]Other  [ ]Pressure ulcer(s)  [ ]y [ ]n  Present on admission      CRITICAL CARE:  [ ] Shock Present  [ ]Septic [ ]Cardiogenic [ ]Neurologic [ ]Hypovolemic  [ ]  Vasopressors [ ]  Inotropes   [ ] Respiratory failure present [ ] Mechanical Ventilation [ ] Non-invasive ventilatory support [ ] High-Flow  [ ] Acute  [ ] Chronic [ ] Hypoxic  [ ] Hypercarbic [ ] Other  [ ] Other organ failure     LABS:            RADIOLOGY & ADDITIONAL STUDIES:    PROTEIN CALORIE MALNUTRITION: [ ] mild [ ] moderate [ ] severe  [ ] underweight [ ] morbid obesity    https://www.andeal.org/vault/2440/web/files/ONC/Table_Clinical%20Characteristics%20to%20Document%20Malnutrition-White%20JV%20et%20al%525178.pdf    Height (cm): 160 (06-03-22 @ 14:28), 160 (05-13-22 @ 15:10), 160 (04-04-22 @ 19:54)  Weight (kg): 49.9 (06-03-22 @ 14:28), 63.5 (05-13-22 @ 15:10), 52.2 (04-04-22 @ 19:54)  BMI (kg/m2): 19.5 (06-03-22 @ 14:28), 24.8 (05-13-22 @ 15:10), 20.4 (04-04-22 @ 19:54)    [ ] PPSV2 < or = 30% [ ] significant weight loss [ ] poor nutritional intake [ ] anasarca   Artificial Nutrition [ ]     REFERRALS:   [ ]Chaplaincy  [ ] Hospice  [ ]Child Life  [ ]Social Work  [ ]Case management [ ]Holistic Therapy [ ] Physical Therapy [ ] Dietary   Goals of Care Document:  GAP TEAM PALLIATIVE CARE UNIT PROGRESS NOTE:      [  ] Patient on hospice program.    INDICATION FOR PALLIATIVE CARE UNIT SERVICES/Interval HPI: Symptom management in the setting of a 88/oF with PMH Dementia, prior ischemic stroke (Not on AC), who p/w fall. CT head demonstrated a large right acute temporal parietal hematoma with intraventricular extension.     OVERNIGHT EVENTS: Chart reviewed. The patient is seen and examined at the bedside. She has increased secretions and is receiving Glycopyrrolate. Her BP improved after receiving the Hydralazine yesterday. This morning vitals with /73, HR: 84, RR: 18, SpO2: 91%.   She required PRN Ativan 0.25mg IV X1, PRN Dilaudid 1mg IV X2for dyspnea and X 0 for pain within a 24hr period 8am-8pm.      DNR on chart: Yes  Yes      Allergies    No Known Allergies    Intolerances    MEDICATIONS  (STANDING):  glycopyrrolate Injectable 0.4 milliGRAM(s) IV Push every 6 hours  HYDROmorphone  Injectable 0.5 milliGRAM(s) IV Push every 6 hours  levETIRAcetam  IVPB 1000 milliGRAM(s) IV Intermittent every 12 hours  levothyroxine Injectable 25 MICROGram(s) IV Push at bedtime  valproate sodium  IVPB 500 milliGRAM(s) IV Intermittent two times a day    MEDICATIONS  (PRN):  acetaminophen  Suppository .. 650 milliGRAM(s) Rectal every 6 hours PRN Temp greater or equal to 38C (100.4F)  bisacodyl Suppository 10 milliGRAM(s) Rectal daily PRN Constipation  hydrALAZINE Injectable 10 milliGRAM(s) IV Push every 4 hours PRN SBP > 160  HYDROmorphone  Injectable 0.5 milliGRAM(s) IV Push every 1 hour PRN Moderate Pain (4 - 6)  HYDROmorphone  Injectable 1 milliGRAM(s) IV Push every 1 hour PRN Severe Pain (7 - 10)  HYDROmorphone  Injectable 1 milliGRAM(s) IV Push every 1 hour PRN dyspnea  LORazepam   Injectable 0.5 milliGRAM(s) IV Push every 4 hours PRN anxiety/agitation/distress  ondansetron Injectable 4 milliGRAM(s) IV Push every 6 hours PRN Nausea and/or Vomiting    ITEMS UNCHECKED ARE NOT PRESENT    PRESENT SYMPTOMS: [ x]Unable to self-report  [ x]PAINADs [ x]RDOS  Source if other than patient:  [ ]Family   [ ]Team     Pain: [ ] yes [x ] no  QOL impact -   Location -                    Aggravating factors -  Quality -  Radiation -  Timing-  Severity (0-10 scale):  Minimal acceptable level (0-10 scale):     PAINAD Score: 0  http://geriatrictoolkit.[email or web address]/cog/painad.pdf (Ctrl +  left click to view)    Dyspnea:                           [ ]Mild [ ]Moderate [ ]Severe    RDOS: 2  0 to 2  minimal or no respiratory distress   3  mild distress  4 to 6 moderate distress  >7 severe distress  [email or web address]_Distress_Observation_Scale.pdf (Ctrl +  left click to view)     Anxiety:                             [ ]Mild [ ]Moderate [ ]Severe  Fatigue:                             [ ]Mild [ ]Moderate [ ]Severe  Nausea:                             [ ]Mild [ ]Moderate [ ]Severe  Loss of appetite:              [ ]Mild [ ]Moderate [ ]Severe  Constipation:                    [ ]Mild [ ]Moderate [ ]Severe  		  Other Symptoms:  [ ]All other review of systems negative     Palliative Performance Status Version 2:      10   %         http://npcrc.org/files/news/palliative_performance_scale_[email or web address].pdf  PHYSICAL EXAM:   Vital Signs Last 24 Hrs  T(C): 36.4 (10 Xander 2022 08:47), Max: 36.4 (10 Xander 2022 08:47)  T(F): 97.6 (10 Xander 2022 08:47), Max: 97.6 (10 Xander 2022 08:47)  HR: 84 (10 Xander 2022 08:47) (84 - 84)  BP: 129/73 (10 Xander 2022 08:47) (129/73 - 129/73)  BP(mean): --  RR: 20 (10 Xander 2022 08:47) (20 - 20)  SpO2: 91% (10 Xander 2022 08:47) (91% - 91%) I&O's Summary    09 Jun 2022 07:01  -  10 Xander 2022 07:00  --------------------------------------------------------  IN: 0 mL / OUT: 500 mL / NET: -500 mL      GENERAL: [ ] Cachexia  [ ]Alert  [ ]Oriented x   [ ]Lethargic  [x ]Unarousable  [ ]Verbal  [x ]Non-Verbal  Behavioral:   [ ] Anxiety  [ ] Delirium [ ] Agitation [ ] Other  HEENT:  [ ]Normal   [ ]Dry mouth   [ ]ET Tube/Trach  [ ]Oral lesions  PULMONARY:   [ ]Clear [ ]Tachypnea  [x ]Audible excessive secretions   [x ]Rhonchi        [ ]Right [ ]Left [x ]Bilateral  [ ]Crackles        [ ]Right [ ]Left [ ]Bilateral  [ ]Wheezing     [ ]Right [ ]Left [ ]Bilateral  [ ]Diminished BS [ ]Right [ ]Left [ ]Bilateral    CARDIOVASCULAR:    [ ]Regular [ ]Irregular [ ]Tachy  [ ]Praveen [ ]Murmur [ ]Other  GASTROINTESTINAL:  [x ]Soft  [ ]Distended   [ ]+BS  [x ]Non tender [ ]Tender  [ ]Other [ ]PEG [ ]OGT/ NGT   Last BM:  6/5/22  GENITOURINARY:  [ ]Normal [ ] Incontinent   [ ]Oliguria/Anuria   [x ]Roy  MUSCULOSKELETAL:   [ ]Normal   [ ]Weakness  [x ]Bed/Wheelchair bound [ ]Edema  NEUROLOGIC:   [ ]No focal deficits  [ ] Cognitive impairment  [ ] Dysphagia [ ]Dysarthria [ ] Paresis [ ]Other   SKIN:   [ ]Normal  [ ]Rash  [ ]Other  [ ]Pressure ulcer(s)  [ ]y [ ]n  Present on admission      CRITICAL CARE:  [ ] Shock Present  [ ]Septic [ ]Cardiogenic [ ]Neurologic [ ]Hypovolemic  [ ]  Vasopressors [ ]  Inotropes   [ ] Respiratory failure present [ ] Mechanical Ventilation [ ] Non-invasive ventilatory support [ ] High-Flow  [ ] Acute  [ ] Chronic [ ] Hypoxic  [ ] Hypercarbic [ ] Other  [ ] Other organ failure     LABS:            RADIOLOGY & ADDITIONAL STUDIES:    PROTEIN CALORIE MALNUTRITION: [ ] mild [ ] moderate [ ] severe  [ ] underweight [ ] morbid obesity    https://www.andeal.org/vault/5580/web/files/ONC/Table_Clinical%20Characteristics%20to%20Document%20Malnutrition-White%20JV%20et%20al%202012.pdf    Height (cm): 160 (06-03-22 @ 14:28), 160 (05-13-22 @ 15:10), 160 (04-04-22 @ 19:54)  Weight (kg): 49.9 (06-03-22 @ 14:28), 63.5 (05-13-22 @ 15:10), 52.2 (04-04-22 @ 19:54)  BMI (kg/m2): 19.5 (06-03-22 @ 14:28), 24.8 (05-13-22 @ 15:10), 20.4 (04-04-22 @ 19:54)    [ x] PPSV2 < or = 30% [ ] significant weight loss [ ] poor nutritional intake [ ] anasarca   Artificial Nutrition [ ]     REFERRALS:   [ ]Chaplaincy  [x ] Hospice  [ ]Child Life  [ ]Social Work  [ ]Case management [ ]Holistic Therapy [ ] Physical Therapy [ ] Dietary   Goals of Care Document:  GAP TEAM PALLIATIVE CARE UNIT PROGRESS NOTE:      [  ] Patient on hospice program.    INDICATION FOR PALLIATIVE CARE UNIT SERVICES/Interval HPI: Symptom management in the setting of a 88/oF with PMH Dementia, prior ischemic stroke (Not on AC), who p/w fall. CT head demonstrated a large right acute temporal parietal hematoma with intraventricular extension.     OVERNIGHT EVENTS: Chart reviewed. The patient is seen and examined at the bedside. She has increased secretions and is receiving Glycopyrrolate. Her BP improved after receiving the Hydralazine yesterday. This morning vitals with /73, HR: 84, RR: 18, SpO2: 91%.   She required PRN Ativan 0.25mg IV X1, PRN Dilaudid 1mg IV X2for dyspnea and X 0 for pain within a 24hr period 8am-8pm.      DNR on chart: Yes  Yes      Allergies    No Known Allergies    Intolerances    MEDICATIONS  (STANDING):  glycopyrrolate Injectable 0.4 milliGRAM(s) IV Push every 6 hours  HYDROmorphone  Injectable 0.5 milliGRAM(s) IV Push every 6 hours  levETIRAcetam  IVPB 1000 milliGRAM(s) IV Intermittent every 12 hours  levothyroxine Injectable 25 MICROGram(s) IV Push at bedtime  valproate sodium  IVPB 500 milliGRAM(s) IV Intermittent two times a day    MEDICATIONS  (PRN):  acetaminophen  Suppository .. 650 milliGRAM(s) Rectal every 6 hours PRN Temp greater or equal to 38C (100.4F)  bisacodyl Suppository 10 milliGRAM(s) Rectal daily PRN Constipation  hydrALAZINE Injectable 10 milliGRAM(s) IV Push every 4 hours PRN SBP > 160  HYDROmorphone  Injectable 0.5 milliGRAM(s) IV Push every 1 hour PRN Moderate Pain (4 - 6)  HYDROmorphone  Injectable 1 milliGRAM(s) IV Push every 1 hour PRN Severe Pain (7 - 10)  HYDROmorphone  Injectable 1 milliGRAM(s) IV Push every 1 hour PRN dyspnea  LORazepam   Injectable 0.5 milliGRAM(s) IV Push every 4 hours PRN anxiety/agitation/distress  ondansetron Injectable 4 milliGRAM(s) IV Push every 6 hours PRN Nausea and/or Vomiting    ITEMS UNCHECKED ARE NOT PRESENT    PRESENT SYMPTOMS: [ x]Unable to self-report  [ x]PAINADs [ x]RDOS  Source if other than patient:  [ ]Family   [ ]Team     Pain: [ ] yes [x ] no  QOL impact -   Location -                    Aggravating factors -  Quality -  Radiation -  Timing-  Severity (0-10 scale):  Minimal acceptable level (0-10 scale):     PAINAD Score: 0  [email or web address]/cog/painad.pdf (Ctrl +  left click to view)    Dyspnea:                           [ ]Mild [ ]Moderate [ ]Severe    RDOS: 2  0 to 2  minimal or no respiratory distress   3  mild distress  4 to 6 moderate distress  >7 severe distress  [email or web address]_Distress_Observation_Scale.pdf (Ctrl +  left click to view)     Anxiety:                             [ ]Mild [ ]Moderate [ ]Severe  Fatigue:                             [ ]Mild [ ]Moderate [ ]Severe  Nausea:                             [ ]Mild [ ]Moderate [ ]Severe  Loss of appetite:              [ ]Mild [ ]Moderate [ ]Severe  Constipation:                    [ ]Mild [ ]Moderate [ ]Severe  		  Other Symptoms:  [x ]All other review of systems negative     Palliative Performance Status Version 2:      10   %         http://npcrc.org/files/news/palliative_performance_scale_ppsv2.pdf  PHYSICAL EXAM:   Vital Signs Last 24 Hrs  T(C): 36.4 (10 Xander 2022 08:47), Max: 36.4 (10 Xander 2022 08:47)  T(F): 97.6 (10 Xander 2022 08:47), Max: 97.6 (10 Xander 2022 08:47)  HR: 84 (10 Xander 2022 08:47) (84 - 84)  BP: 129/73 (10 Xander 2022 08:47) (129/73 - 129/73)  BP(mean): --  RR: 20 (10 Xander 2022 08:47) (20 - 20)  SpO2: 91% (10 Xander 2022 08:47) (91% - 91%) I&O's Summary    09 Jun 2022 07:01  -  10 Xander 2022 07:00  --------------------------------------------------------  IN: 0 mL / OUT: 500 mL / NET: -500 mL      GENERAL: [ ] Cachexia  [ ]Alert  [ ]Oriented x   [ ]Lethargic  [x ]Unarousable  [ ]Verbal  [x ]Non-Verbal  Behavioral:   [ ] Anxiety  [ ] Delirium [ ] Agitation [ ] Other  HEENT:  x[ ]Normal   [ ]Dry mouth   [ ]ET Tube/Trach  [ ]Oral lesions  PULMONARY:   [ ]Clear [ ]Tachypnea  [x ]Audible excessive secretions   [x ]Rhonchi        [ ]Right [ ]Left [x ]Bilateral  [ ]Crackles        [ ]Right [ ]Left [ ]Bilateral  [ ]Wheezing     [ ]Right [ ]Left [ ]Bilateral  [ ]Diminished BS [ ]Right [ ]Left [ ]Bilateral    CARDIOVASCULAR:    x[ ]Regular [ ]Irregular [ ]Tachy  [ ]Praveen [ ]Murmur [ ]Other  GASTROINTESTINAL:  [x ]Soft  [ ]Distended   [x ]+BS  [x ]Non tender [ ]Tender  [ ]Other [ ]PEG [ ]OGT/ NGT   Last BM:  6/5/22  GENITOURINARY:  [ ]Normal [ x] Incontinent   [ ]Oliguria/Anuria   [x ]Roy  MUSCULOSKELETAL:   [ ]Normal   [x ]Weakness  [x ]Bed/Wheelchair bound [ ]Edema  NEUROLOGIC:   [ ]No focal deficits  [x ] Cognitive impairment  [ ] Dysphagia [ ]Dysarthria [ ] Paresis [ ]Other   SKIN:   x[ ]Normal  [ ]Rash  [ ]Other  [ ]Pressure ulcer(s)  [ ]y [ ]n  Present on admission      CRITICAL CARE:  [ ] Shock Present  [ ]Septic [ ]Cardiogenic [ ]Neurologic [ ]Hypovolemic  [ ]  Vasopressors [ ]  Inotropes   [ ] Respiratory failure present [ ] Mechanical Ventilation [ ] Non-invasive ventilatory support [ ] High-Flow  [ ] Acute  [ ] Chronic [ ] Hypoxic  [ ] Hypercarbic [ ] Other  [x ] Other organ failure     LABS: I have reviewed all pertinent imaging, laboratory and microbiology studies at this visit.             RADIOLOGY & ADDITIONAL STUDIES: I have reviewed all pertinent imaging, laboratory and microbiology studies at this visit.     PROTEIN CALORIE MALNUTRITION: [ ] mild [ ] moderate [ ] severe  [ ] underweight [ ] morbid obesity    https://www.andeal.org/vault/1100/web/files/ONC/Table_Clinical%20Characteristics%20to%20Document%20Malnutrition-White%20JV%20et%20al%202012.pdf    Height (cm): 160 (06-03-22 @ 14:28), 160 (05-13-22 @ 15:10), 160 (04-04-22 @ 19:54)  Weight (kg): 49.9 (06-03-22 @ 14:28), 63.5 (05-13-22 @ 15:10), 52.2 (04-04-22 @ 19:54)  BMI (kg/m2): 19.5 (06-03-22 @ 14:28), 24.8 (05-13-22 @ 15:10), 20.4 (04-04-22 @ 19:54)    [ x] PPSV2 < or = 30% [ ] significant weight loss [ ] poor nutritional intake [ ] anasarca   Artificial Nutrition [ ]     REFERRALS:   [ ]Chaplaincy  [x ] Hospice  [ ]Child Life  [x ]Social Work  [ ]Case management [ ]Holistic Therapy [ ] Physical Therapy [ ] Dietary   Goals of Care Document:

## 2022-06-10 NOTE — PROGRESS NOTE ADULT - PROBLEM SELECTOR PLAN 6
not a neurosurgical candidate   symptom-directed care
not a neurosurgical candidate   symptom-directed care  PRN Hydralazine for BP
Intracranial hematoma.   ·  Plan: not a neurosurgical candidate   symptom-directed care  PRN Hydralazine for BP.
not a neurosurgical candidate   symptom-directed care
C/w IV Dilaudid 0.5mg PRN (used x1 in 24hr 8AM-8AM)
not a neurosurgical candidate   symptom-directed care  PRN Hydralazine for BP

## 2022-06-10 NOTE — PROGRESS NOTE ADULT - PROBLEM SELECTOR PROBLEM 6
Intracranial hematoma
Intracranial hematoma
Pain, acute
Intracranial hematoma

## 2022-06-10 NOTE — PROGRESS NOTE ADULT - ATTENDING COMMENTS
88F, dementia, lives at assisted living, presents to ED with left sided weakness and right sided eye gaze after a fall. EMS states staff at NH said patients last known normal was 5 hours prior to ED arrival. Patient has hx of stroke in Oct 2021. No AC. Never had a brain bleed. No fevers. No n/v/d. Patient unable to give history. (03 Jun 2022 18:22)    CT head demonstrated Large right acute temporal parietal hematoma with intraventricular extension. Hemorrhage seen extending into the bilateral lateral ventricles. Mass effect on the right lateral ventricle with midline shift to the left.    HTN above systolic 160--> start IVP hydralazine 10 mg Q4H prn   Bladder soft, no distention of abdomen   Patient noted to have central fever in last 24 hrs s/p Tylenol
88F, dementia, from assisted living, with stroke sx, work up revealed large right acute temporal parietal hematoma with intraventricular extension. and hemorrhage into the bilateral lateral ventricles with mass effect.  Patient transferred to PCU for sx management and disposition planning, hospice transition to be addressed if clinical condition permits.    In the setting of parenteral controlled substance administration, clinical monitoring required for side effects such as respiratory depression, constipation and opioid induced neurotoxicity.  This patient is at high risk due to brain injury. Pending hospice approval for inpatient.  Patient assessment and plan discussed on interdisciplinary team rounds today.
88F, dementia, from assisted living, with stroke sx, work up revealed large right acute temporal parietal hematoma with intraventricular extension. and hemorrhage into the bilateral lateral ventricles with mass effect.  Patient transferred to PCU for sx management and disposition planning, hospice transition to be addressed if clinical condition permits.   Patient's decision maker is a niece who agrees to hospice evaluation.  Referral sent.  Patient assessment and plan discussed on interdisciplinary team rounds today.   In the setting of parenteral controlled substance administration, clinical monitoring required for side effects such as respiratory depression, constipation and opioid induced neurotoxicity.  This patient is at high risk due to brain injury.
88F, dementia, from assisted living, with stroke sx, work up revealed large right acute temporal parietal hematoma with intraventricular extension. and hemorrhage into the bilateral lateral ventricles with mass effect.  Patient transferred to PCU for sx management and disposition planning, hospice, pending bed at the [facility].     In the setting of parenteral controlled substance administration, clinical monitoring required for side effects such as respiratory depression, constipation and opioid induced neurotoxicity.  This patient is at high risk due to brain injury.  Patient assessment and plan discussed on interdisciplinary team rounds today.

## 2022-06-10 NOTE — PROGRESS NOTE ADULT - PROBLEM SELECTOR PLAN 8
Sunitha - Niece and decision maker.   Confirmed patient is DNR/DNI and she is the health care proxy. She lives far from the hospital but patient does have friends in the area whom will come to visit.  Continue PCU level of care. Hospice referral was made. In the setting of parenteral controlled substance administration, clinical monitoring required for side effects such as respiratory depression, constipation and opioid induced neurotoxicity.  This patient is at high risk due to brain hemorrhage.    Hospice referral was made to [facility].   Pt's family is agreeable for transfer to [facility]. Currently awaiting bed availability.     Additional contacts for visitation as the pt's Niece is travelling out of country (Not for decision making):   Sarah Denson [phone].   Anatoliy Mckinley [phone].

## 2022-06-10 NOTE — PROGRESS NOTE ADULT - PROBLEM SELECTOR PLAN 1
- Continue Glycopyrrolate  q6hr ATC  - Turn and position  - Gentle suction PRN
not a neurosurgical candidate   symptom-directed care
- Continue Glycopyrrolate  q6hr ATC  - Turn and position  - Gentle suction PRN
- Glycopyrrolate changed to q6hr ATC  - Turn and position  - Gentle suction PRN
- Continue Glycopyrrolate  q6hr ATC  - Turn and position  - Gentle suction PRN.
- Continue Glycopyrrolate  q6hr ATC  - Turn and position  - Gentle suction PRN

## 2022-06-10 NOTE — PROGRESS NOTE ADULT - PROBLEM SELECTOR PROBLEM 5
Functional quadriplegia
Functional quadriplegia
Gurgling breath sounds
Functional quadriplegia

## 2022-06-10 NOTE — PHYSICAL THERAPY INITIAL EVALUATION ADULT - GAIT DEVIATIONS NOTED, PT EVAL
decreased neil/decreased step length/decreased weight-shifting ability Quinolones Counseling:  I discussed with the patient the risks of fluoroquinolones including but not limited to GI upset, allergic reaction, drug rash, diarrhea, dizziness, photosensitivity, yeast infections, liver function test abnormalities, tendonitis/tendon rupture.

## 2022-06-10 NOTE — PROGRESS NOTE ADULT - REASON FOR ADMISSION
S/p fall

## 2022-06-10 NOTE — PROGRESS NOTE ADULT - PROBLEM SELECTOR PROBLEM 8
Encounter for palliative care
At risk of seizures
Encounter for palliative care

## 2022-06-10 NOTE — PROGRESS NOTE ADULT - PROVIDER SPECIALTY LIST ADULT
Internal Medicine
Palliative Care
Internal Medicine
Palliative Care

## 2022-06-10 NOTE — PROGRESS NOTE ADULT - PROBLEM SELECTOR PROBLEM 7
At risk of seizures
HTN (hypertension)
Encounter for palliative care

## 2022-06-10 NOTE — PROGRESS NOTE ADULT - PROBLEM SELECTOR PROBLEM 1
Gurgling breath sounds
Intracranial hematoma
Gurgling breath sounds
Gurgling breath sounds

## 2022-06-11 NOTE — DISCHARGE NOTE FOR THE EXPIRED PATIENT - HOSPITAL COURSE
88F, dementia, lives at assisted living, presents to ED with left sided weakness and right sided eye gaze after a fall. EMS states staff at NH said patients last known normal was 5 hours prior to ED arrival. Patient has hx of stroke in Oct 2021. No AC. Never had a brain bleed. No fevers. No n/v/d. Patient unable to give history. CT head demonstrated Large right acute temporal parietal hematoma with intraventricular extension. Hemorrhage seen extending into the bilateral lateral ventricles. Mass effect on the right lateral ventricle with midline shift to the left. GAP team was consulted for GOC. and symptom management. The patient is now on the PCU for symptom management.  Patient passed away in the PCU on 6/10. 88F, dementia, lives at assisted living, presents to ED with left sided weakness and right sided eye gaze after a fall. EMS states staff at NH said patients last known normal was 5 hours prior to ED arrival. Patient has hx of stroke in Oct 2021. No AC. Never had a brain bleed. No fevers. No n/v/d. Patient unable to give history. CT head demonstrated Large right acute temporal parietal hematoma with intraventricular extension. Hemorrhage seen extending into the bilateral lateral ventricles. Mass effect on the right lateral ventricle with midline shift to the left. GAP team was consulted for GOC. and symptom management. The patient is now on the PCU for symptom management.  Patient passed away in the PCU on 6/10.    Brain herniation/compression is present.

## 2022-06-11 NOTE — PROVIDER CONTACT NOTE (OTHER) - RECOMMENDATIONS
Patient pronounced at 0200  Family Sarah Denson ([phone]) informed via telephone, unable to reach niece/HCP Sunitha Mitchell ([phone]), per Kaveh Smith is in [place]

## 2022-06-11 NOTE — CHART NOTE - NSCHARTNOTEFT_GEN_A_CORE
Spoke to ME as pt admitted with s/p fall with Large right acute temporal parietal hematoma with intraventricular extension  Per ME case not reportable.    Mia Edmondson NP  [id number]

## 2022-06-11 NOTE — DISCHARGE NOTE FOR THE EXPIRED PATIENT - NS EXPIRED FAMCONTACTED
Family Sarah Maynardz ([phone]) informed via telephone, unable to reach niece/HCP Sunitha Mitchell ([phone]), per Sarah, Kaveh is in [place]

## 2022-07-14 NOTE — ED ADULT NURSE NOTE - NURSING ED SKIN COLOR
normal for race Where Do You Want The Question To Include Opioid Counseling Located?: Case Summary Tab

## 2022-07-27 NOTE — CONSULT NOTE ADULT - CONSULT REQUESTED BY NAME
jer nuñez  acs [FreeTextEntry1] : 70 year old man with a history of atrial fibrillation, SDH HTN, HLD, allergic conjunctivitis, neuropathy. \par He was taken to [place] after a mechanical fall and had a SDH. He was intubated for a brief time. He was successfully extubated. Per his wife he has was noted to have a CVA. He went to [facility] TBI rehab. He was there for about 7 months. he has been home for a month. \par \par He is now here for a followup.   \par Since his last visit, he was admitted to  with COVID and then readmitted for PNA. Since then he has been essentially bed bound. He has now been primary in the wheelchair. He still cannot stand on his own.  He denies any significant dizziness when standing or doing his physical therapy. He denies any chest pain. He denies palpitations, dyspnea, PND, orthopnea, lower extremity edema, LOC.  No reported melena, hematochezia or hematemesis. \par

## 2022-08-17 NOTE — ED ADULT TRIAGE NOTE - NS ED TRIAGE AVPU SCALE
LVM x1 to discuss, awaiting call back    From TE 7/25/22: Called to review lumbar MRI results, left voicemail. Most significant findings at L4-5, may be a new disc bulge and has moderate central canal as well as mild to moderate bilateral foraminal narrowing. I recommend an epidural steroid injection for radicular leg pain.    Lyubov BECKER RN Care Coordinator  [facility]         Alert-The patient is alert, awake and responds to voice. The patient is oriented to time, place, and person. The triage nurse is able to obtain subjective information.

## 2022-11-12 NOTE — ED ADULT NURSE REASSESSMENT NOTE - NEURO SENSATION
Major Shift Events: O2 via NC @ 7L. Ambulated in hallway x1 - more tired today. PRN oxy x1. Emesis x1 - PRN compazine given. Discontinued D10 gtt.  Plan: Transfer to floor when able to wean down O2.  For vital signs and complete assessments, please see documentation flowsheets.          sensory intact

## 2022-12-05 NOTE — DIETITIAN INITIAL EVALUATION ADULT. - PERTINENT LABORATORY DATA
09-04 Na139 mmol/L Glu --    K+ 4.4 mmol/L Cr  --    BUN --    09-03 Phos 3.7 mg/dL 08-31 Alb 2.5 g/dL<L> 08-24 Chol 157 mg/dL LDL 82 mg/dL HDL 59 mg/dL Trig 81 mg/dL Surgeon: Dr. Edmar Mora

## 2022-12-14 NOTE — CONSULT NOTE ADULT - NSCONSULTADDITIONALINFOA_GEN_ALL_CORE
NSGY Attg:    see above    patient seen and examined by PA staff    imaging reviewed    agree with plan as above  repeat CT pending htn; hypothyroidism; asthma

## 2023-01-28 NOTE — DIETITIAN INITIAL EVALUATION ADULT. - PATIENT PROFILE REVIEWED
yes Progress Note    ALBINO RIVAS 80y (1942) Male [id number]  01-25-23 (3d)    Chief Complaint: Weakness        Subjective:  No acute events overnight. Patient seen and examined at bedside.    Review of Systems:  REVIEW OF SYSTEMS:  CONSTITUTIONAL: No weakness, fevers or chills  EYES/ENT: No visual changes;  No vertigo or throat pain   NECK: No pain or stiffness  RESPIRATORY: No cough, wheezing, hemoptysis; No shortness of breath  CARDIOVASCULAR: No chest pain or palpitations  GASTROINTESTINAL: No abdominal or epigastric pain. No nausea, vomiting, or hematemesis; No diarrhea or constipation. No melena or hematochezia.  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 650 milliGRAM(s) Oral every 6 hours PRN  aMIOdarone    Tablet 200 milliGRAM(s) Oral daily  apixaban 2.5 milliGRAM(s) Oral two times a day  ascorbic acid 500 milliGRAM(s) Oral daily  atorvastatin 80 milliGRAM(s) Oral at bedtime  carbidopa/levodopa  25/100 1 Tablet(s) Oral <User Schedule>  carbidopa/levodopa  25/100 1 Tablet(s) Oral <User Schedule>  clopidogrel Tablet 75 milliGRAM(s) Oral daily  desvenlafaxine ER 25 milliGRAM(s) Oral daily  melatonin 3 milliGRAM(s) Oral at bedtime  mirtazapine 7.5 milliGRAM(s) Oral at bedtime  OLANZapine 2.5 milliGRAM(s) Oral at bedtime  oxybutynin 10 milliGRAM(s) Oral two times a day  piperacillin/tazobactam IVPB.. 3.375 Gram(s) IV Intermittent every 12 hours  polyethylene glycol 3350 17 Gram(s) Oral two times a day  senna 2 Tablet(s) Oral at bedtime  sodium chloride 0.9% Bolus 1000 milliLiter(s) IV Bolus once  tadalafil 5 milliGRAM(s) Oral daily    Objective:  T(C): 36.8 (01-28-23 @ 05:38), Max: 36.9 (01-27-23 @ 08:49)  HR: 73 (01-28-23 @ 05:38) (60 - 73)  BP: 104/90 (01-28-23 @ 05:38) (91/45 - 106/61)  RR: 17 (01-28-23 @ 05:38) (15 - 17)  SpO2: 100% (01-28-23 @ 05:38) (100% - 100%)    Physical exam:  GENERAL: NAD, well-developed  HEAD:  Atraumatic, Normocephalic  EYES: EOMI, PERRLA, conjunctiva and sclera clear  NECK: Supple, No JVD  CHEST/LUNG: Clear to auscultation bilaterally; No wheeze  HEART: Regular rate and rhythm; No murmurs, rubs, or gallops  ABDOMEN: Soft, Nontender, Nondistended; Bowel sounds present  EXTREMITIES:  2+ Peripheral Pulses, No clubbing, cyanosis, or edema  PSYCH: AAOx3  NEUROLOGY: non-focal  SKIN: No rashes or lesions      01-27-23 @ 07:01  -  01-28-23 @ 07:00  --------------------------------------------------------  IN: 100 mL / OUT: 280 mL / NET: -180 mL        CAPILLARY BLOOD GLUCOSE      (01-28 @ 06:39)                      7.6  7.35 )-----------( 247                 25.1    Neutrophils = 5.41 (73.6%)  Lymphocytes = 1.21 (16.5%)  Eosinophils = 0.13 (1.8%)  Basophils = 0.03 (0.4%)  Monocytes = 0.54 (7.3%)  Bands = --%    01-28    139  |  112<H>  |  32<H>  ----------------------------<  98  4.0   |  18<L>  |  2.24<H>    Ca    8.7      28 Jan 2023 06:39  Phos  2.5     01-28  Mg     1.90     01-28    TPro  5.7<L>  /  Alb  2.7<L>  /  TBili  0.3  /  DBili  x   /  AST  24  /  ALT  9   /  AlkPhos  227<H>  01-28    ( 28 Jan 2023 06:39 )   PT: 22.0 sec;   INR: 1.88 ratio;       PTT:36.2 sec      RVP:(01-24 @ 16:10)  [facility]            Tox:             WBC Trend: 7.35<--, 6.99<--, 8.66<--    Hb Trend: 7.6<--, 7.3<--, 7.8<--, 8.3<--, 9.1<--        New imaging in last 24 hours:  Consult notes reviewed: Progress Note    ALBINO RIVAS 80y (1942) Male [id number]  01-25-23 (3d)    Chief Complaint: Weakness        Subjective:  O/N received zyprexa 2.5 for agitation. SBPs in 100s, held off on fluids. Soft stool. Patient seen and examined at bedside.    Review of Systems:  No complaints. Requesting to go home.    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 650 milliGRAM(s) Oral every 6 hours PRN  aMIOdarone    Tablet 200 milliGRAM(s) Oral daily  apixaban 2.5 milliGRAM(s) Oral two times a day  ascorbic acid 500 milliGRAM(s) Oral daily  atorvastatin 80 milliGRAM(s) Oral at bedtime  carbidopa/levodopa  25/100 1 Tablet(s) Oral <User Schedule>  carbidopa/levodopa  25/100 1 Tablet(s) Oral <User Schedule>  clopidogrel Tablet 75 milliGRAM(s) Oral daily  desvenlafaxine ER 25 milliGRAM(s) Oral daily  melatonin 3 milliGRAM(s) Oral at bedtime  mirtazapine 7.5 milliGRAM(s) Oral at bedtime  OLANZapine 2.5 milliGRAM(s) Oral at bedtime  oxybutynin 10 milliGRAM(s) Oral two times a day  piperacillin/tazobactam IVPB.. 3.375 Gram(s) IV Intermittent every 12 hours  polyethylene glycol 3350 17 Gram(s) Oral two times a day  senna 2 Tablet(s) Oral at bedtime  sodium chloride 0.9% Bolus 1000 milliLiter(s) IV Bolus once  tadalafil 5 milliGRAM(s) Oral daily    Objective:  T(C): 36.8 (01-28-23 @ 05:38), Max: 36.9 (01-27-23 @ 08:49)  HR: 73 (01-28-23 @ 05:38) (60 - 73)  BP: 104/90 (01-28-23 @ 05:38) (91/45 - 106/61)  RR: 17 (01-28-23 @ 05:38) (15 - 17)  SpO2: 100% (01-28-23 @ 05:38) (100% - 100%)    Physical exam:  GENERAL: NAD, well-developed  HEAD:  Atraumatic, Normocephalic  EYES: EOMI, PERRLA, conjunctiva and sclera clear  NECK: Supple, No JVD  CHEST/LUNG: Clear to auscultation bilaterally; No wheeze  HEART: Regular rate and rhythm; No murmurs, rubs, or gallops  ABDOMEN: Soft, Nontender, Nondistended; Bowel sounds present  EXTREMITIES:  2+ Peripheral Pulses, No clubbing, cyanosis, or edema  PSYCH: AAOx3  NEUROLOGY: non-focal  SKIN: No rashes or lesions      01-27-23 @ 07:01  -  01-28-23 @ 07:00  --------------------------------------------------------  IN: 100 mL / OUT: 280 mL / NET: -180 mL        CAPILLARY BLOOD GLUCOSE      (01-28 @ 06:39)                      7.6  7.35 )-----------( 247                 25.1    Neutrophils = 5.41 (73.6%)  Lymphocytes = 1.21 (16.5%)  Eosinophils = 0.13 (1.8%)  Basophils = 0.03 (0.4%)  Monocytes = 0.54 (7.3%)  Bands = --%    01-28    139  |  112<H>  |  32<H>  ----------------------------<  98  4.0   |  18<L>  |  2.24<H>    Ca    8.7      28 Jan 2023 06:39  Phos  2.5     01-28  Mg     1.90     01-28    TPro  5.7<L>  /  Alb  2.7<L>  /  TBili  0.3  /  DBili  x   /  AST  24  /  ALT  9   /  AlkPhos  227<H>  01-28    ( 28 Jan 2023 06:39 )   PT: 22.0 sec;   INR: 1.88 ratio;       PTT:36.2 sec      RVP:(01-24 @ 16:10)  NotDetec            Tox:             WBC Trend: 7.35<--, 6.99<--, 8.66<--    Hb Trend: 7.6<--, 7.3<--, 7.8<--, 8.3<--, 9.1<--        New imaging in last 24 hours:  Consult notes reviewed:

## 2023-02-08 NOTE — ED ADULT NURSE NOTE - FINAL NURSING ELECTRONIC SIGNATURE
Detail Level: Simple Fluence Units: mJ/cm2 Post-Care Instructions: I reviewed with the patient in detail post-care instructions. Patient should stay away from the sun and wear sun protection until treated areas are fully healed. Comments: Treatment performed by Gilda Tobias RN. Spot Size: 2 x 2 cm Treatment Number: 76 Fluence #1 (J/Cm2 Or Mj/Cm2): 5131 Mode: repeat paint Consent: Written consent obtained, risks reviewed including but not limited to crusting, scabbing, blistering, scarring, darker or lighter pigmentary change, incidental hair removal, bruising, and/or incomplete removal. Location #1: BL knees, BL elbows Total Square Area In Cm2 (Required For Proper Billing- Whole Numbers Only Please): 42 24-Dec-2021 11:20

## 2023-03-09 NOTE — ED ADULT NURSE NOTE - HIV OFFER
[facility] on [facility] instead?     Please advise, thank you Opt out

## 2023-04-03 NOTE — PHYSICAL THERAPY INITIAL EVALUATION ADULT - NEUROVASCULAR ASSESSMENT LLE
warm/no numbness/no tingling/no discoloration release capsule TAKE 1 CAPSULE BY MOUTH TWICE DAILY 30 capsule 11    metoprolol tartrate (LOPRESSOR) 25 MG tablet TAKE 1 TABLET BY MOUTH TWICE DAILY 60 tablet 5    glimepiride (AMARYL) 4 MG tablet TAKE 1 TABLET BY MOUTH TWICE DAILY 60 tablet 11    lisinopril (PRINIVIL;ZESTRIL) 5 MG tablet TAKE 1 TABLET BY MOUTH ONCE DAILY 30 tablet 11    blood glucose monitor strips 1 strip by Other route 4 times daily Test 4  times a day & as needed for symptoms of irregular blood glucose. 100 strip 11    atorvastatin (LIPITOR) 10 MG tablet TAKE 1 TABLET BY MOUTH ONE TIME A DAY  30 tablet 11    [employer] LANCETS ULTRATHIN 30G MISC 1 each by Other route 3 times daily 100 each 11    albuterol sulfate HFA (VENTOLIN HFA) 108 (90 Base) MCG/ACT inhaler Inhale 1 puff into the lungs every 4 hours as needed for Wheezing 18 g 6    blood glucose test strips (ASCENSIA AUTODISC VI;ONE TOUCH ULTRA TEST VI) strip Use with associated glucose meter. 100 strip 11    acetaminophen (AMINOFEN) 325 MG tablet Take 2 tablets by mouth every 6 hours as needed for Pain 50 tablet 0    aspirin 81 MG tablet Take 81 mg by mouth daily        Allergies: Leopold Bearded is allergic to codeine and simvastatin. Past Medical History:   Diagnosis Date    Calculus of kidney     Essential hypertension, benign     Gout, unspecified     H/O colonoscopy 4/11/2016    2016    Mitral valve disorders(424.0)     Other and unspecified hyperlipidemia     Type II or unspecified type diabetes mellitus without mention of complication, not stated as uncontrolled     Unspecified chronic liver disease without mention of alcohol        Past Surgical History:   Procedure Laterality Date    LITHOTRIPSY      UPPER GASTROINTESTINAL ENDOSCOPY N/A 5/25/2018    The esophagus was inspected to the Z-line. The endoscopic exam showed impression of varices (F1) in distal esophagus. Social History: Leopold Bearded  reports that he quit smoking about 8 years ago.  He has quit using smokeless tobacco. He reports previous alcohol use. He reports current drug use. Drug: Marijuana. Family History   Problem Relation Age of Onset    Cancer Other     Diabetes Other     High Blood Pressure Other     Heart Disease Other        Current Medications:     magnesium oxide  400 mg Oral Daily    aspirin  81 mg Oral Daily    atorvastatin  10 mg Oral Daily    lisinopril  5 mg Oral Daily    metoprolol tartrate  25 mg Oral BID    budesonide-formoterol  2 puff Inhalation BID    pantoprazole  40 mg Oral QAM AC    tamsulosin  0.4 mg Oral Daily    clopidogrel  75 mg Oral Daily    sodium chloride flush  10 mL Intravenous 2 times per day    enoxaparin  30 mg Subcutaneous BID    insulin lispro  0-6 Units Subcutaneous TID WC    insulin lispro  0-3 Units Subcutaneous Nightly     PRN Meds include: perflutren lipid microspheres, sodium chloride flush, sodium chloride flush, acetaminophen **OR** acetaminophen, polyethylene glycol, potassium chloride **OR** potassium alternative oral replacement **OR** potassium chloride, ondansetron, glucose, dextrose, glucagon (rDNA), dextrose    ROS:   Constitutional Negative for fever and chills   HEENT Negative for ear discharge, ear pain, nosebleed   Eyes Negative for photophobia, pain and discharge   Respiratory Negative for hemoptysis and sputum   Cardiovascular Negative for orthopnea, claudication and PND   Gastrointestinal Negative for abdominal pain, diarrhea, blood in stool   Musculoskeletal Negative for joint pain, negative for myalgia   Skin Negative for rash or itching   Endo/heme/allergies Negative for polydipsia, environmental allergy   Psychiatric Negative for suicidal ideation.   Patient is not anxious           Objective:   /70   Pulse 82   Temp 98.3 °F (36.8 °C) (Oral)   Resp 16   Ht 6' (1.829 m)   Wt 233 lb 7.5 oz (105.9 kg)   SpO2 95%   BMI 31.66 kg/m²     Blood pressure range: Systolic (52JMM), IFP:370 , Min:93 , Max:139   ; Diastolic (96UXR), IZW:54, Min:58, Max:83      Continuous infusions:    dextrose         ON EXAMINATION:  GENERAL  Appears comfortable and in no distress   HEENT  NC/ AT   NECK  Supple and no bruits heard   Cardiovascular  S1, S2 heard; radial pulse intact   MENTAL STATUS:  Alert, oriented, intact memory, no confusion, normal speech, normal language, no hallucination or delusion   CRANIAL NERVES: II     -       Pupils reactive b/l., Fundus exam: intact venous pulsations; Visual fields intact to confrontation  III,IV,VI -  EOMs full, no afferent defect, no                      CAIO, no ptosis  V     -     Impaired LT on Rt face  VII    -    Right UMN facial palsy  VIII   -     Intact hearing  IX,X -     Symmetrical palate  XI    -     Symmetrical shoulder shrug  XII   -     Midline tongue, no atrophy    MOTOR FUNCTION:  Normal bulk, normal tone, normal power;  no involuntary movements, no tremor   SENSORY FUNCTION:  Normal touch, normal pin, normal vibration, normal proprioception   CEREBELLAR FUNCTION:  Intact fine motor control over upper limbs   REFLEX FUNCTION:  Symmetric, no perverted reflex, no Babinski sign   STATION and GAIT  Normal Station and no ataxia noted.           Data:    Lab Results:     Lab Results   Component Value Date    CHOL 92 01/07/2019    LDLCHOLESTEROL 41 01/07/2019    HDL 32 (L) 01/07/2019    TRIG 94 01/07/2019    ALT 33 03/31/2020    AST 31 03/31/2020    TSH 1.16 03/30/2020    INR 1.0 03/30/2020    LABA1C 7.9 08/23/2019    LABMICR 25 (H) 01/14/2018    [id number] 410 10/23/2014     Hematology:  Recent Labs     03/30/20  1245 03/31/20  0607   WBC 3.9 3.2*   HGB 15.0 13.5   HCT 44.4 39.6*   PLT 79* 71*   INR 1.0  --      Chemistry:  Recent Labs     03/30/20  1245 03/30/20  1249 03/31/20  0607     --  142   K 4.0  --  3.5*     --  107   CO2 24  --  24   GLUCOSE 329*  --  154*   BUN 11  --  13   CREATININE 0.58* 0.62 0.52*   MG  --   --  1.5*   CALCIUM 9.4  --  9.0     Recent Labs yes

## 2023-09-27 NOTE — ED ADULT NURSE NOTE - CAS DISCH ACCOMP BY
Chief Complaint   Patient presents with   • Toe Pain     Pt c/o bilateral toe pain for 3 days now. the right toe hurts more. Pt states she might have infection. Pt states at night she can not sleep because her toes are very painful.         SUBJECTIVE:   HISTORY OF PRESENT ILLNESS:  Mehreen Robert is a 89 year old female who presents today for toe pain.     Second toe on both feet started to hurt/become red and swollen 4 days ago  Pt was trying to cut her toenails but thinks she may have cut the skin/caused irritation  Has never had foot infection before  No hx of diabetes  Because of bunions her toes rub together and causing pain  Redness on inside of toes  No fevers, chill, systemic symptoms    Lives with daughter  Doesn't routinely see podiatrist but has been trying to cut her own toenails  Having some mobility issues recently due to recent joint surgery    PAST MEDICAL HISTORY:  Past Medical History:   Diagnosis Date   • Arthritis    • Breast cancer (CMD)    • Coronary artery disease    • Essential (primary) hypertension    • Hyperlipoproteinemia        PAST SURGICAL HISTORY:  Past Surgical History:   Procedure Laterality Date   • Mastectomy partial Right    • Open access colonoscopy     • Remv 2nd cataract,corn-scler sectn     • Total hip replacement Right        FAMILY HISTORY:  Family History   Problem Relation Age of Onset   • Stroke Mother    • Diabetes Mother    • Motor Vehicle Accident Father        SOCIAL HISTORY:  Social History     Tobacco Use   • Smoking status: Former     Current packs/day: 0.00     Types: Cigarettes     Quit date: 1980     Years since quittin.7   • Smokeless tobacco: Never   Vaping Use   • Vaping Use: never used   Substance Use Topics   • Alcohol use: Not Currently   • Drug use: Never       ALLERGIES:  ALLERGIES:   Allergen Reactions   • Rosuvastatin MYALGIA     Other reaction(s): MYALGIA       MEDICATIONS:  Current Outpatient Medications   Medication Sig Dispense Refill   •  cephalexin (KEFLEX) 500 MG capsule Take 1 capsule by mouth 4 times daily for 5 days. 20 capsule 0   • anastrozole (ARIMIDEX) 1 MG tablet N/A     • triamcinolone (ARISTOCORT) 0.1 % cream APPLY TOPICALLY TO THE AFFECTED AREA TWICE DAILY     • neomycin-polymyxin-hydroCORTisone (CORTISPORIN) 3.5-56378-9 otic suspension Place 3 drops into both ears in the morning and 3 drops at noon and 3 drops in the evening. 10 mL 0   • acetaminophen-codeine 300-30 MG per tablet Take 1 tablet by mouth 2 times daily as needed for Pain. 60 tablet 1   • Sennosides 25 MG Tab Take 25 mg by mouth every other day. take at night     • CARBOXYMethylcellulose (REFRESH PLUS) 1 % ophthalmic solution Place 1 drop into both eyes 3 times daily as needed (eye irritation). Indications: Irritation of the Eye     • acetaminophen (TYLENOL) 500 MG tablet Take 2 tablets by mouth every 8 hours. (Patient taking differently: Take 1 tablet by mouth every 8 hours. Indications: Pain) 90 tablet 0   • docusate sodium-sennosides (SENOKOT S) 50-8.6 MG per tablet Take 2 tablets by mouth 2 times daily as needed for Constipation. 30 tablet 0   • meloxicam (MOBIC) 7.5 MG tablet Take 1 tablet by mouth daily. 30 tablet 0   • famotidine (PEPCID) 20 MG tablet Take 1 tablet by mouth daily. 30 tablet 0   • aspirin 325 MG EC tablet Take 1 tablet by mouth every 12 hours. 60 tablet 0   • hydrALAZINE (APRESOLINE) 25 MG tablet TAKE 2 TABLETS IN THE MORNING, 1 AT NOON AND 2 TABLETS EVERY EVENING 450 tablet 3   • naLOXone (NARCAN) 4 MG/0.1ML nasal spray Spray the content of 1 device into 1 nostril. Call 911. May repeat with 2nd device in alternate nostril if no response in 2-3 minutes. Use in case of suspected opioid overdose. 2 each 1   • Nutritional Supplements (OSTEO ADVANCE) Tab      • Blood Pressure Monitoring (BLOOD PRESSURE MONITOR AUTOMAT) Device 1 Device daily. 1 Device 0     No current facility-administered medications for this visit.       Review of Systems    Constitutional: Negative for chills, fatigue and fever.   HENT: Negative for congestion and sore throat.    Eyes: Negative for visual disturbance.   Respiratory: Negative for cough, shortness of breath, wheezing and stridor.    Cardiovascular: Negative for chest pain, palpitations and leg swelling.   Gastrointestinal: Negative for abdominal distention, abdominal pain, blood in stool, constipation, diarrhea, nausea and vomiting.   Endocrine: Negative.    Genitourinary: Negative for difficulty urinating.   Musculoskeletal: Positive for joint swelling. Negative for myalgias.   Skin: Positive for color change. Negative for rash.   Allergic/Immunologic: Negative.    Neurological: Negative for dizziness, weakness and headaches.   Hematological: Negative.    Psychiatric/Behavioral: Negative.        OBJECTIVE:     Visit Vitals  /64 (BP Location: RUE - Right upper extremity, Patient Position: Sitting, Cuff Size: Regular)   Pulse 80   Temp 98.7 °F (37.1 °C) (Tympanic)   Resp 14   Ht 5' 1\" (1.549 m)   Wt 63.7 kg (140 lb 8.7 oz)   SpO2 98%   BMI 26.56 kg/m²       Physical Exam  Vitals reviewed.   Constitutional:       General: She is not in acute distress.     Appearance: Normal appearance.   HENT:      Head: Normocephalic.      Right Ear: External ear normal.      Left Ear: External ear normal.      Nose: Nose normal.      Mouth/Throat:      Mouth: Mucous membranes are moist.      Pharynx: Oropharynx is clear. No oropharyngeal exudate or posterior oropharyngeal erythema.      Neck: Normal range of motion and neck supple.   Eyes:      Extraocular Movements: Extraocular movements intact.      Conjunctiva/sclera: Conjunctivae normal.      Pupils: Pupils are equal, round, and reactive to light.   Cardiovascular:      Rate and Rhythm: Normal rate and regular rhythm.      Pulses: Normal pulses.      Heart sounds: Normal heart sounds. No murmur heard.     No gallop.   Pulmonary:      Effort: Pulmonary effort is normal. No  respiratory distress.      Breath sounds: Normal breath sounds. No stridor. No wheezing, rhonchi or rales.   Abdominal:      General: Abdomen is flat. Bowel sounds are normal. There is no distension.      Palpations: Abdomen is soft. There is no mass.      Tenderness: There is no abdominal tenderness. There is no right CVA tenderness, left CVA tenderness or guarding.   Skin:     General: Skin is warm and dry.      Capillary Refill: Capillary refill takes less than 2 seconds.      Findings: Erythema present. No rash.      Comments: Bilateral second toes with erythema/warmth on the inner portion of toe, starting at toenail. No abcess/fluctuance. Mild TTP around erythema. Able to move all toes, good perfusion. See photo.   Neurological:      General: No focal deficit present.      Mental Status: She is alert and oriented to person, place, and time.   Psychiatric:         Mood and Affect: Mood normal.         Thought Content: Thought content normal.                 DIAGNOSTIC STUDIES:   LAB RESULTS:    No results found for this visit on 09/27/23.    Assessment AND PLAN:     Problem List Items Addressed This Visit        Infectious Diseases    Cellulitis of toe - Primary     Bilateral second toes with small area of skin/soft tissue infection  No evidence of abscess   Likely 2/2 skin irritation from bunion/possibly exacerbated by patient's attempt to cut her own toenails.  No obvious ingrown toenail, however patient would benefit from podiatry referral for assistance with foot care/better foot support to prevent future infections    Due to cellulitis- will rx keflex 500mg QID x 5 days  Recommended gentle foot soaks and to keep feet clean/dry and not to further cut toenails independently  Pt expressed understanding  Will f/u with PCP on 10/2         Relevant Medications    cephalexin (KEFLEX) 500 MG capsule    Other Relevant Orders    SERVICE TO PODIATRY       Musculoskeletal and Injuries    Bilateral bunions    Relevant  Orders    SERVICE TO PODIATRY     Return in about 5 days (around 10/2/2023).     Offered flu vax - would prefer to get on 10/2    Instructions provided as documented in the AVS.    The patient indicated understanding of the diagnosis, agreed with the plan of care and agreed to call or return with any new or worsening symptoms.    Portions of this note may have been created using the Dragon voice recognition system.  Errors in content may be related to improper recognition of the system.  Effort to review and correct the note has been made but irregularities may still be present    TIME: I spent 31 min preparing to see patent (including chart review and preparation), obtaining and or reviewing additional medical history, performing a physical exam and evaluation, documenting clinical information in the electronic health record, independently interpreting results, communicating results to the patient, family or caregiver, and/or coordinating care on the date of service.    Vita Mccarthy, DO  Family Medicine  Please contact through [employer]  9/27/2023      Self

## 2023-10-01 NOTE — ED ADULT TRIAGE NOTE - DIRECT TO ROOM CARE INITIATED:
23-Aug-2020 13:59 Alert-The patient is alert, awake and responds to voice. The patient is oriented to time, place, and person. The triage nurse is able to obtain subjective information.

## 2024-02-01 NOTE — ED ADULT NURSE NOTE - CAS TRG GENERAL NORM CIRC DET
Detail Level: Zone Initiate Treatment: ivermectin 1 % topical cream: Apply to face every morning\\n\\nsulfacetamide sodium 10 %-sulfur 1 % topical cleanser : Wash face QAM\\n\\nivermectin 3 mg tablet: Take 6 tablets PO today, and repeat in 1 week Strong peripheral pulses

## 2024-02-22 NOTE — ED ADULT NURSE NOTE - CCCP TRG CHIEF CMPLNT
Occupational Therapy Visit    Visit Type: Daily Treatment Note  Visit: 3  Referring Provider: CHRISTIANO Blake  Medical Diagnosis (from order): G54.0 - Thoracic outlet syndrome     SUBJECTIVE                                                                                                               Felt a few days of relief after last therapy session. Getting cortisone injection tomorrow - unsure to which areas yet.     Epidural injection to low back last week with one of improvement.     Pain / Symptoms  - Pain rating (out of 10): Current: 6   - Location: Bilateral UEs     OBJECTIVE                                                                                                                                        Treatment    Attended Electrical-Stimulation  - Purpose: pain relief and reduction of muscle spasm  - Delivery via: needles  - Attended Electrical-Stimulation/TENS (24870): (Transcutaneous Electrical Nerve Stimulation)   Patient has been made aware of potential contraindications and possible risks associated with the use of modality and has agreed.  Electrical stimulation applied during dry needling treatment to right upper trapezius, infraspinatus, and subscapularis muscles to promote muscle function and reduce trigger points  Mode chosen: Pointer 2 handheld estim unit pre-set with varied frequencies and intensity to patient tolerance/muscle contraction   Results: decreased pain; no adverse reaction to treatment    Dry Needling:  - Consent signed: yes  - Dry needling used to/for: pain relief, reduced myofascial dysfunction/restriction, reduction of muscle spasm and improve range of motion  - Education about indications, contraindications and potential side effects completed with patient.  Screen Completed    - Precautions: local skin lesions, lyme disease, local lymphedema, severe hyperalgesia/allodynia, metal allergies: nickel and chromium, abnormal bleeding tendency, immunodeficiency and/or  compromised immune system, second or third trimester of pregnancy, vascular disease, history of spontaneous pneumothorax   - Contraindications: local or systemic infections including the flu, over implants, active cancer, area of lymphatic compromise, area of lumpectomy/mastectomy, first trimester of pregnancy      - Location: right upper trapezius Needle size: 50 Quantity: 2   - Location: right infraspinatus Needle size: 40 Quantity: 1   - Location: right subscapularis Needle size: 50 Quantity: 1    Total Needles Inserted: 4 Removed: 4    Time Out performed at 1110, with patient verifying procedure and consent prior to performing the procedure.    Sign Out performed at 1125, with patient verifying procedure performed and addressing specimens if applicable upon completion of the procedure.    Ultrasound  - Location: left upper trapezius  - Position: sidelying  - Duty Cycle: 100%  - Frequency: 3 Mhz  - Intensity (w/cm2): 1.0  - Duration: 10 minutes    Results: improved range of motion and tissue softening  Reaction: no adverse reaction to treatment      Manual Therapy   Soft tissue mobilization applied to right upper trapezius, infraspinatus, and subscapularis prior to and post-dry needling.     Soft tissue mobilization applied to left upper trapezius, pect minor, and biceps post US to maximize healing and promote tissue pliability.     Skilled input: verbal instruction/cues, tactile instruction/cues and as detailed above    Writer verbally educated and received verbal consent for hand placement, positioning of patient, and techniques to be performed today from patient for clothing adjustments for techniques, therapist position for techniques and hand placement and palpation for techniques as described above and how they are pertinent to the patient's plan of care.  Home Exercise Program  Activity modifications      ASSESSMENT                                                                                              headache                Pt reports a few day of relief following dry needling and soft tissue work last therapy session. He feels this continues to be most beneficial at this time. Due to limited time and bilateral involvement, focused on dry needling to right upper trapezius, subscapularis, and infraspinatus today. He is tender and significantly tight in subscapularis today. He is encouraged to continue stretches and self massage as able. He will benefit from nerve mobilization and proximal strengthening exercises as well. Will plan to incorporate in upcoming visits. He continues to benefit from additional skilled therapy in order to reduce pain and promote function.  Pain/symptoms after session (out of 10): 6  Education:   - Results of above outlined education: Needs reinforcement, Demonstrates understanding and Verbalizes understanding    PLAN                                                                                                                           Suggestions for next session as indicated: Progress per plan of care, soft tissue mobilization, dry needling vs US PRN, proximal stability/strengthening, NERVE MOBILIZATION*       Therapy procedure time and total treatment time can be found documented on the Time Entry flowsheet

## 2024-03-12 NOTE — ED PROVIDER NOTE - CPE EDP CARDIAC NORM
Diagnosis: AB- Graves    Current Medication:  ATD   Start 2/8/2024  Methimazole 5mg daily    Results:  Lab Results   Component Value Date    TSH <0.015 (L) 03/12/2024    [id number] 89 07/24/2018    FREET4 1.62 (H) 03/12/2024       Recommended Medication Changes:  Methimazole 10mg daily    Labs in 4 weeks.    
- - -

## 2024-06-19 NOTE — PROGRESS NOTE ADULT - PROBLEM SELECTOR PROBLEM 6
Continued Stay SW/CM Assessment/Plan of Care Note       Progress note:    SW received message from [employer]/ [employer]/ offering for MD to complete another peer to peer w/ medical director @ [employer].     SW discussed w/ MD- Dr. Soni and Director of CM- Kayla, and it was determined that pt is able to return home with HH / no need for peer to peer to be complete    SW discussed at length with pt, who is a,ox4 and in agreement with returning home with HH services. Pt confirms she has keys to get in her house and that her caretaker is available to assist at home.    Update 2:00pm:    Pt requesting transportation home for tomorrow 6/20/24 , as her caretaker will be home to receive her. MD aware.   Prophylactic measure Intracranial bleed HTN (hypertension)

## 2024-07-28 NOTE — PHYSICAL THERAPY INITIAL EVALUATION ADULT - LIVES WITH, PROFILE
Pt. came from [facility]
FAMILY HISTORY:  No pertinent family history in first degree relatives

## 2024-09-16 NOTE — PATIENT PROFILE ADULT - SAFE PLACE TO LIVE
Problem: Risk for Fetal Complications  Goal: Fetal well being is maintained  Outcome: Monitoring/Evaluating progress  Goal: Verbalizes understanding of fetal monitoring  Description: Document on Patient Education Activity  Outcome: Monitoring/Evaluating progress     Problem: Risk for Maternal Complications  Goal: Remains free of signs and symptoms of infection  Outcome: Monitoring/Evaluating progress  Goal: Physical assessment maintained within expected parameters  Outcome: Monitoring/Evaluating progress  Goal: Remains free from falls  Outcome: Monitoring/Evaluating progress  Goal: Verbalizes understanding of fall prevention and safety devices  Description: Document on Patient Education Activity  Outcome: Monitoring/Evaluating progress     Problem: Moderate/High Risk for Postpartum Hemorrhage (PPH)  Goal: Hemodynamic stability is maintained  Outcome: Monitoring/Evaluating progress     Problem: Labor  Goal: Patient/family childbirth preparation and goals established  Outcome: Monitoring/Evaluating progress  Goal: Safe delivery of mother and fetus  Outcome: Monitoring/Evaluating progress  Goal: Acceptable labor coping is achieved/maintained  Outcome: Monitoring/Evaluating progress  Goal: Verbalizes understanding of labor process and labor coping interventions  Description: Document on Patient Education Activity  Outcome: Monitoring/Evaluating progress  Goal: Verbalizes understanding of PCEA if epidural initiated  Description: Document on Patient Education Activity  Outcome: Monitoring/Evaluating progress  Goal: Verbalizes understanding of effective use of Nitrous Oxide  Description: Document on Patient Education Activity  Outcome: Monitoring/Evaluating progress     Problem: Pain (Non-Labor and/or Postpartum)  Goal: Acceptable pain/ comfort level is achieved/maintained at rest and with activity without oversedation (based on self-reporting using NRS / Faces)  Outcome: Monitoring/Evaluating progress  Goal: Verbalizes  understanding of pain management  Description: Document on Patient Education Activity  Outcome: Monitoring/Evaluating progress  Goal: Verbalizes understanding of effective use of Patient Controlled Analgesia (PCA)  Description: Document on Patient Education Activity  Outcome: Monitoring/Evaluating progress     Problem: Postpartum  Goal: Demonstrates progression toward physical  / emotional / psychosocial postpartum recovery  Outcome: Monitoring/Evaluating progress  Goal: Demonstrates positive reciprocal attachment with infant  Outcome: Monitoring/Evaluating progress  Goal: Verbalizes understanding of normal physical and emotional alterations associated with puerperium  Description: Document on Patient Education Activity  Outcome: Monitoring/Evaluating progress  Goal: Begins to establish milk supply to meet personal breastfeeding goals  Outcome: Monitoring/Evaluating progress     Problem:   Goal: Surgical incision will remain free of infection  Outcome: Monitoring/Evaluating progress  Goal: Activity level is resumed to level needed for discharge  Outcome: Monitoring/Evaluating progress  Goal: Verbalizes understanding of  birth and recovery in the hospital and after discharge  Description: Document on Patient Education Activity  Outcome: Monitoring/Evaluating progress     Problem:  Transfer to Outside [facility] if Breastfeeding  Goal: Verbalizes understanding of initiating breastfeeding and pumping/ handling  Description: Document on Patient Education Activity  Outcome: Monitoring/Evaluating progress     Problem:  Loss  Goal: Identifies interventions that help with the coping process  Outcome: Monitoring/Evaluating progress      no

## 2025-05-23 NOTE — ED ADULT NURSE REASSESSMENT NOTE - NIH STROKE SCALE RESULT
----- Message from Jeff Anthony D'Amico sent at 5/22/2025  6:03 PM CDT -----  Nursing if you call them, speak with his daughter tomorrow, based on the last thyroid function test his numbers are still high, it is probably worth him taking a little bit of thyroid medication in   the morning.  Realize he can take this with food or not I do not care, a touch of mild thyroid medication may give him a pep of energy as well.  I did send this to his pharmacy.  ----- Message -----  From: Lab, Background User  Sent: 5/22/2025   5:15 PM CDT  To: Jeff Anthony D'Amico, DO    
Called daughter per HIPAA and relayed  message - verbalized understanding  
0 (no Stroke)

## 2025-07-17 NOTE — ED ADULT TRIAGE NOTE - ADDITIONAL SAFETY/BANDS...
- Reports pain in her back following vaccine injection and occasional discomfort when sleeping in certain positions.  - Prescription for lidocaine patches will be provided to manage the pain.  Orders:    lidocaine (LIDODERM) 5 % patch; Place 1 patch onto the skin every 24 hours.     Additional Safety/Bands: